# Patient Record
Sex: FEMALE | Race: WHITE | Employment: UNEMPLOYED
[De-identification: names, ages, dates, MRNs, and addresses within clinical notes are randomized per-mention and may not be internally consistent; named-entity substitution may affect disease eponyms.]

---

## 2017-01-02 DIAGNOSIS — I10 BENIGN ESSENTIAL HTN: ICD-10-CM

## 2017-01-03 RX ORDER — LISINOPRIL 10 MG/1
TABLET ORAL
Qty: 30 TABLET | Refills: 5 | Status: SHIPPED | OUTPATIENT
Start: 2017-01-03 | End: 2017-06-27 | Stop reason: SDUPTHER

## 2017-01-03 RX ORDER — TRIAMTERENE AND HYDROCHLOROTHIAZIDE 37.5; 25 MG/1; MG/1
CAPSULE ORAL
Qty: 30 CAPSULE | Refills: 5 | Status: SHIPPED | OUTPATIENT
Start: 2017-01-03 | End: 2017-03-24 | Stop reason: ALTCHOICE

## 2017-01-06 ENCOUNTER — OFFICE VISIT (OUTPATIENT)
Dept: FAMILY MEDICINE CLINIC | Facility: CLINIC | Age: 82
End: 2017-01-06

## 2017-01-06 VITALS
SYSTOLIC BLOOD PRESSURE: 130 MMHG | WEIGHT: 165 LBS | BODY MASS INDEX: 26.52 KG/M2 | HEART RATE: 90 BPM | HEIGHT: 66 IN | DIASTOLIC BLOOD PRESSURE: 82 MMHG

## 2017-01-06 DIAGNOSIS — I25.10 CORONARY ARTERY DISEASE DUE TO LIPID RICH PLAQUE: ICD-10-CM

## 2017-01-06 DIAGNOSIS — I25.83 CORONARY ARTERY DISEASE DUE TO LIPID RICH PLAQUE: ICD-10-CM

## 2017-01-06 DIAGNOSIS — E78.00 HYPERCHOLESTEROLEMIA: ICD-10-CM

## 2017-01-06 DIAGNOSIS — I10 BENIGN ESSENTIAL HTN: Primary | ICD-10-CM

## 2017-01-06 DIAGNOSIS — K21.9 GASTROESOPHAGEAL REFLUX DISEASE WITHOUT ESOPHAGITIS: ICD-10-CM

## 2017-01-06 DIAGNOSIS — N18.2 CONTROLLED TYPE 2 DIABETES MELLITUS WITH STAGE 2 CHRONIC KIDNEY DISEASE, WITHOUT LONG-TERM CURRENT USE OF INSULIN (HCC): ICD-10-CM

## 2017-01-06 DIAGNOSIS — E11.22 CONTROLLED TYPE 2 DIABETES MELLITUS WITH STAGE 2 CHRONIC KIDNEY DISEASE, WITHOUT LONG-TERM CURRENT USE OF INSULIN (HCC): ICD-10-CM

## 2017-01-06 DIAGNOSIS — Z23 NEED FOR INFLUENZA VACCINATION: ICD-10-CM

## 2017-01-06 DIAGNOSIS — Z12.31 VISIT FOR SCREENING MAMMOGRAM: ICD-10-CM

## 2017-01-06 DIAGNOSIS — N18.2 CRI (CHRONIC RENAL INSUFFICIENCY), STAGE 2 (MILD): ICD-10-CM

## 2017-01-06 DIAGNOSIS — E78.2 MIXED HYPERLIPIDEMIA: ICD-10-CM

## 2017-01-06 PROCEDURE — G0008 ADMIN INFLUENZA VIRUS VAC: HCPCS | Performed by: INTERNAL MEDICINE

## 2017-01-06 PROCEDURE — 99214 OFFICE O/P EST MOD 30 MIN: CPT | Performed by: INTERNAL MEDICINE

## 2017-01-06 PROCEDURE — 90686 IIV4 VACC NO PRSV 0.5 ML IM: CPT | Performed by: INTERNAL MEDICINE

## 2017-01-06 ASSESSMENT — ENCOUNTER SYMPTOMS
RHINORRHEA: 0
NAUSEA: 0
CHEST TIGHTNESS: 0
CONSTIPATION: 0
BLOOD IN STOOL: 0
SORE THROAT: 0
ABDOMINAL PAIN: 0
DIARRHEA: 0
SHORTNESS OF BREATH: 0
COUGH: 0

## 2017-01-16 DIAGNOSIS — F32.A DEPRESSION: ICD-10-CM

## 2017-01-16 DIAGNOSIS — K21.00 GASTROESOPHAGEAL REFLUX DISEASE WITH ESOPHAGITIS: ICD-10-CM

## 2017-01-16 RX ORDER — PAROXETINE HYDROCHLORIDE 20 MG/1
TABLET, FILM COATED ORAL
Qty: 30 TABLET | Refills: 5 | Status: SHIPPED | OUTPATIENT
Start: 2017-01-16 | End: 2017-06-27 | Stop reason: SDUPTHER

## 2017-01-16 RX ORDER — LANSOPRAZOLE 30 MG/1
CAPSULE, DELAYED RELEASE ORAL
Qty: 30 CAPSULE | Refills: 5 | Status: SHIPPED | OUTPATIENT
Start: 2017-01-16 | End: 2017-06-27 | Stop reason: SDUPTHER

## 2017-01-24 DIAGNOSIS — N18.2 CONTROLLED TYPE 2 DIABETES MELLITUS WITH STAGE 2 CHRONIC KIDNEY DISEASE, WITHOUT LONG-TERM CURRENT USE OF INSULIN (HCC): ICD-10-CM

## 2017-01-24 DIAGNOSIS — E11.22 CONTROLLED TYPE 2 DIABETES MELLITUS WITH STAGE 2 CHRONIC KIDNEY DISEASE, WITHOUT LONG-TERM CURRENT USE OF INSULIN (HCC): ICD-10-CM

## 2017-02-28 DIAGNOSIS — N18.2 CONTROLLED TYPE 2 DIABETES MELLITUS WITH STAGE 2 CHRONIC KIDNEY DISEASE, WITHOUT LONG-TERM CURRENT USE OF INSULIN (HCC): ICD-10-CM

## 2017-02-28 DIAGNOSIS — E11.22 CONTROLLED TYPE 2 DIABETES MELLITUS WITH STAGE 2 CHRONIC KIDNEY DISEASE, WITHOUT LONG-TERM CURRENT USE OF INSULIN (HCC): ICD-10-CM

## 2017-03-20 ENCOUNTER — HOSPITAL ENCOUNTER (OUTPATIENT)
Dept: GENERAL RADIOLOGY | Age: 82
Discharge: HOME | End: 2017-03-20
Payer: MEDICARE

## 2017-03-20 ENCOUNTER — HOSPITAL ENCOUNTER (OUTPATIENT)
Age: 82
Discharge: HOME | End: 2017-03-20
Payer: MEDICARE

## 2017-03-20 DIAGNOSIS — I10 BENIGN ESSENTIAL HTN: ICD-10-CM

## 2017-03-20 DIAGNOSIS — E78.2 MIXED HYPERLIPIDEMIA: ICD-10-CM

## 2017-03-20 DIAGNOSIS — N18.2 CRI (CHRONIC RENAL INSUFFICIENCY), STAGE 2 (MILD): ICD-10-CM

## 2017-03-20 DIAGNOSIS — I25.10 CORONARY ARTERY DISEASE DUE TO LIPID RICH PLAQUE: ICD-10-CM

## 2017-03-20 DIAGNOSIS — I51.9 HEART DISEASE: ICD-10-CM

## 2017-03-20 DIAGNOSIS — I25.83 CORONARY ARTERY DISEASE DUE TO LIPID RICH PLAQUE: ICD-10-CM

## 2017-03-20 DIAGNOSIS — E11.9 DM II (DIABETES MELLITUS, TYPE II), CONTROLLED (HCC): ICD-10-CM

## 2017-03-20 DIAGNOSIS — E78.00 HYPERCHOLESTEROLEMIA: ICD-10-CM

## 2017-03-20 DIAGNOSIS — E55.9 UNSPECIFIED VITAMIN D DEFICIENCY: ICD-10-CM

## 2017-03-20 LAB
ABSOLUTE EOS #: 0.1 K/UL (ref 0–0.4)
ABSOLUTE LYMPH #: 1.9 K/UL (ref 1.1–2.7)
ABSOLUTE MONO #: 0.3 K/UL (ref 0–1)
ALBUMIN SERPL-MCNC: 4.1 G/DL (ref 3.5–5.2)
ALBUMIN/GLOBULIN RATIO: ABNORMAL (ref 1–2.5)
ALP BLD-CCNC: 104 U/L (ref 35–104)
ALT SERPL-CCNC: 10 U/L (ref 5–33)
ANION GAP SERPL CALCULATED.3IONS-SCNC: 13 MMOL/L (ref 9–17)
AST SERPL-CCNC: 14 U/L
BASOPHILS # BLD: 1 % (ref 0–2)
BASOPHILS ABSOLUTE: 0 K/UL (ref 0–0.2)
BILIRUB SERPL-MCNC: 0.28 MG/DL (ref 0.3–1.2)
BUN BLDV-MCNC: 27 MG/DL (ref 8–23)
BUN/CREAT BLD: 17 (ref 9–20)
CALCIUM SERPL-MCNC: 9.9 MG/DL (ref 8.6–10.4)
CHLORIDE BLD-SCNC: 103 MMOL/L (ref 98–107)
CHOLESTEROL/HDL RATIO: 2.8
CHOLESTEROL: 187 MG/DL
CO2: 25 MMOL/L (ref 20–31)
CREAT SERPL-MCNC: 1.58 MG/DL (ref 0.5–0.9)
DIFFERENTIAL TYPE: YES
EOSINOPHILS RELATIVE PERCENT: 2 % (ref 0–5)
GFR AFRICAN AMERICAN: 38 ML/MIN
GFR NON-AFRICAN AMERICAN: 31 ML/MIN
GFR SERPL CREATININE-BSD FRML MDRD: ABNORMAL ML/MIN/{1.73_M2}
GFR SERPL CREATININE-BSD FRML MDRD: ABNORMAL ML/MIN/{1.73_M2}
GLUCOSE BLD-MCNC: 104 MG/DL (ref 70–99)
HCT VFR BLD CALC: 37.9 % (ref 36–46)
HDLC SERPL-MCNC: 68 MG/DL
HEMOGLOBIN: 12.8 G/DL (ref 12–16)
LDL CHOLESTEROL: 83 MG/DL (ref 0–130)
LYMPHOCYTES # BLD: 30 % (ref 15–40)
MCH RBC QN AUTO: 29.8 PG (ref 26–34)
MCHC RBC AUTO-ENTMCNC: 33.8 G/DL (ref 31–37)
MCV RBC AUTO: 88.3 FL (ref 80–100)
MONOCYTES # BLD: 5 % (ref 4–8)
PATIENT FASTING?: YES
PDW BLD-RTO: 13.8 % (ref 12.1–15.2)
PLATELET # BLD: 211 K/UL (ref 140–450)
PLATELET ESTIMATE: NORMAL
PMV BLD AUTO: NORMAL FL (ref 6–12)
POTASSIUM SERPL-SCNC: 4.2 MMOL/L (ref 3.7–5.3)
RBC # BLD: 4.29 M/UL (ref 4–5.2)
RBC # BLD: NORMAL 10*6/UL
SEG NEUTROPHILS: 62 % (ref 47–75)
SEGMENTED NEUTROPHILS ABSOLUTE COUNT: 4.1 K/UL (ref 2.5–7)
SODIUM BLD-SCNC: 141 MMOL/L (ref 135–144)
TOTAL PROTEIN: 7.4 G/DL (ref 6.4–8.3)
TRIGL SERPL-MCNC: 180 MG/DL
TSH SERPL DL<=0.05 MIU/L-ACNC: 0.74 MIU/L (ref 0.3–5)
VITAMIN D 25-HYDROXY: 43.1 NG/ML (ref 30–100)
VLDLC SERPL CALC-MCNC: 36 MG/DL (ref 1–30)
WBC # BLD: 6.5 K/UL (ref 3.5–11)
WBC # BLD: NORMAL 10*3/UL

## 2017-03-20 PROCEDURE — 82306 VITAMIN D 25 HYDROXY: CPT

## 2017-03-20 PROCEDURE — 71020 XR CHEST STANDARD TWO VW: CPT

## 2017-03-20 PROCEDURE — 36415 COLL VENOUS BLD VENIPUNCTURE: CPT

## 2017-03-20 PROCEDURE — 93005 ELECTROCARDIOGRAM TRACING: CPT

## 2017-03-20 PROCEDURE — 85025 COMPLETE CBC W/AUTO DIFF WBC: CPT

## 2017-03-20 PROCEDURE — 80053 COMPREHEN METABOLIC PANEL: CPT

## 2017-03-20 PROCEDURE — 84443 ASSAY THYROID STIM HORMONE: CPT

## 2017-03-20 PROCEDURE — 80061 LIPID PANEL: CPT

## 2017-03-24 ENCOUNTER — OFFICE VISIT (OUTPATIENT)
Dept: CARDIOLOGY CLINIC | Age: 82
End: 2017-03-24
Payer: MEDICARE

## 2017-03-24 VITALS
BODY MASS INDEX: 26.79 KG/M2 | HEART RATE: 106 BPM | DIASTOLIC BLOOD PRESSURE: 78 MMHG | SYSTOLIC BLOOD PRESSURE: 120 MMHG | OXYGEN SATURATION: 98 % | WEIGHT: 166 LBS

## 2017-03-24 DIAGNOSIS — I51.9 HEART DISEASE: ICD-10-CM

## 2017-03-24 DIAGNOSIS — E78.2 MIXED HYPERLIPIDEMIA: ICD-10-CM

## 2017-03-24 DIAGNOSIS — I25.83 CORONARY ARTERY DISEASE DUE TO LIPID RICH PLAQUE: ICD-10-CM

## 2017-03-24 DIAGNOSIS — I10 BENIGN ESSENTIAL HTN: ICD-10-CM

## 2017-03-24 DIAGNOSIS — E11.22 CONTROLLED TYPE 2 DIABETES MELLITUS WITH STAGE 2 CHRONIC KIDNEY DISEASE, WITHOUT LONG-TERM CURRENT USE OF INSULIN (HCC): ICD-10-CM

## 2017-03-24 DIAGNOSIS — I25.10 CORONARY ARTERY DISEASE DUE TO LIPID RICH PLAQUE: ICD-10-CM

## 2017-03-24 DIAGNOSIS — E55.9 VITAMIN D DEFICIENCY: Primary | ICD-10-CM

## 2017-03-24 DIAGNOSIS — N18.2 CONTROLLED TYPE 2 DIABETES MELLITUS WITH STAGE 2 CHRONIC KIDNEY DISEASE, WITHOUT LONG-TERM CURRENT USE OF INSULIN (HCC): ICD-10-CM

## 2017-03-24 PROCEDURE — 99214 OFFICE O/P EST MOD 30 MIN: CPT | Performed by: INTERNAL MEDICINE

## 2017-03-24 RX ORDER — TRIAMTERENE AND HYDROCHLOROTHIAZIDE 37.5; 25 MG/1; MG/1
1 CAPSULE ORAL EVERY MORNING
COMMUNITY
End: 2017-06-27

## 2017-03-28 LAB
EKG ATRIAL RATE: 77 BPM
EKG P AXIS: 43 DEGREES
EKG P-R INTERVAL: 176 MS
EKG Q-T INTERVAL: 318 MS
EKG QRS DURATION: 58 MS
EKG QTC CALCULATION (BAZETT): 359 MS
EKG R AXIS: 60 DEGREES
EKG T AXIS: 26 DEGREES
EKG VENTRICULAR RATE: 77 BPM

## 2017-03-30 DIAGNOSIS — N18.2 CONTROLLED TYPE 2 DIABETES MELLITUS WITH STAGE 2 CHRONIC KIDNEY DISEASE, WITHOUT LONG-TERM CURRENT USE OF INSULIN (HCC): ICD-10-CM

## 2017-03-30 DIAGNOSIS — E11.22 CONTROLLED TYPE 2 DIABETES MELLITUS WITH STAGE 2 CHRONIC KIDNEY DISEASE, WITHOUT LONG-TERM CURRENT USE OF INSULIN (HCC): ICD-10-CM

## 2017-05-09 DIAGNOSIS — N18.2 CONTROLLED TYPE 2 DIABETES MELLITUS WITH STAGE 2 CHRONIC KIDNEY DISEASE, WITHOUT LONG-TERM CURRENT USE OF INSULIN (HCC): ICD-10-CM

## 2017-05-09 DIAGNOSIS — E11.22 CONTROLLED TYPE 2 DIABETES MELLITUS WITH STAGE 2 CHRONIC KIDNEY DISEASE, WITHOUT LONG-TERM CURRENT USE OF INSULIN (HCC): ICD-10-CM

## 2017-06-27 DIAGNOSIS — F32.A DEPRESSION: ICD-10-CM

## 2017-06-27 DIAGNOSIS — K21.00 GASTROESOPHAGEAL REFLUX DISEASE WITH ESOPHAGITIS: ICD-10-CM

## 2017-06-27 DIAGNOSIS — E78.2 MIXED HYPERLIPIDEMIA: ICD-10-CM

## 2017-06-27 DIAGNOSIS — I10 BENIGN ESSENTIAL HTN: ICD-10-CM

## 2017-06-27 RX ORDER — TRIAMTERENE AND HYDROCHLOROTHIAZIDE 37.5; 25 MG/1; MG/1
CAPSULE ORAL
Qty: 30 CAPSULE | Refills: 2 | Status: SHIPPED | OUTPATIENT
Start: 2017-06-27 | End: 2017-09-19 | Stop reason: SDUPTHER

## 2017-06-27 RX ORDER — PAROXETINE HYDROCHLORIDE 20 MG/1
TABLET, FILM COATED ORAL
Qty: 30 TABLET | Refills: 2 | Status: SHIPPED | OUTPATIENT
Start: 2017-06-27 | End: 2017-09-19 | Stop reason: SDUPTHER

## 2017-06-27 RX ORDER — LOVASTATIN 40 MG/1
TABLET ORAL
Qty: 60 TABLET | Refills: 2 | Status: SHIPPED | OUTPATIENT
Start: 2017-06-27 | End: 2018-01-06 | Stop reason: SDUPTHER

## 2017-06-27 RX ORDER — LANSOPRAZOLE 30 MG/1
CAPSULE, DELAYED RELEASE ORAL
Qty: 30 CAPSULE | Refills: 2 | Status: SHIPPED | OUTPATIENT
Start: 2017-06-27 | End: 2017-09-19 | Stop reason: SDUPTHER

## 2017-06-27 RX ORDER — LISINOPRIL 10 MG/1
TABLET ORAL
Qty: 30 TABLET | Refills: 2 | Status: SHIPPED | OUTPATIENT
Start: 2017-06-27 | End: 2017-09-19 | Stop reason: SDUPTHER

## 2017-07-06 ENCOUNTER — OFFICE VISIT (OUTPATIENT)
Dept: FAMILY MEDICINE CLINIC | Age: 82
End: 2017-07-06
Payer: MEDICARE

## 2017-07-06 VITALS
HEIGHT: 62 IN | SYSTOLIC BLOOD PRESSURE: 139 MMHG | BODY MASS INDEX: 30.73 KG/M2 | WEIGHT: 167 LBS | DIASTOLIC BLOOD PRESSURE: 84 MMHG | HEART RATE: 76 BPM

## 2017-07-06 DIAGNOSIS — E78.00 HYPERCHOLESTEROLEMIA: ICD-10-CM

## 2017-07-06 DIAGNOSIS — E11.22 CONTROLLED TYPE 2 DIABETES MELLITUS WITH STAGE 2 CHRONIC KIDNEY DISEASE, WITHOUT LONG-TERM CURRENT USE OF INSULIN (HCC): ICD-10-CM

## 2017-07-06 DIAGNOSIS — E55.9 VITAMIN D DEFICIENCY: ICD-10-CM

## 2017-07-06 DIAGNOSIS — F32.5 MAJOR DEPRESSIVE DISORDER WITH SINGLE EPISODE, IN FULL REMISSION (HCC): ICD-10-CM

## 2017-07-06 DIAGNOSIS — Z23 NEED FOR PNEUMOCOCCAL VACCINATION: ICD-10-CM

## 2017-07-06 DIAGNOSIS — N18.2 CRI (CHRONIC RENAL INSUFFICIENCY), STAGE 2 (MILD): ICD-10-CM

## 2017-07-06 DIAGNOSIS — I10 BENIGN ESSENTIAL HTN: Primary | ICD-10-CM

## 2017-07-06 DIAGNOSIS — Z85.038 HISTORY OF COLON CANCER: ICD-10-CM

## 2017-07-06 DIAGNOSIS — Z12.11 COLON CANCER SCREENING: ICD-10-CM

## 2017-07-06 DIAGNOSIS — N18.2 CONTROLLED TYPE 2 DIABETES MELLITUS WITH STAGE 2 CHRONIC KIDNEY DISEASE, WITHOUT LONG-TERM CURRENT USE OF INSULIN (HCC): ICD-10-CM

## 2017-07-06 DIAGNOSIS — K21.9 GASTROESOPHAGEAL REFLUX DISEASE WITHOUT ESOPHAGITIS: ICD-10-CM

## 2017-07-06 DIAGNOSIS — I51.9 HEART DISEASE: ICD-10-CM

## 2017-07-06 PROCEDURE — 99214 OFFICE O/P EST MOD 30 MIN: CPT | Performed by: INTERNAL MEDICINE

## 2017-07-06 PROCEDURE — 90732 PPSV23 VACC 2 YRS+ SUBQ/IM: CPT | Performed by: INTERNAL MEDICINE

## 2017-07-06 PROCEDURE — G0009 ADMIN PNEUMOCOCCAL VACCINE: HCPCS | Performed by: INTERNAL MEDICINE

## 2017-07-06 PROCEDURE — 4040F PNEUMOC VAC/ADMIN/RCVD: CPT | Performed by: INTERNAL MEDICINE

## 2017-07-06 PROCEDURE — G8417 CALC BMI ABV UP PARAM F/U: HCPCS | Performed by: INTERNAL MEDICINE

## 2017-07-06 PROCEDURE — 1090F PRES/ABSN URINE INCON ASSESS: CPT | Performed by: INTERNAL MEDICINE

## 2017-07-06 PROCEDURE — 1036F TOBACCO NON-USER: CPT | Performed by: INTERNAL MEDICINE

## 2017-07-06 PROCEDURE — G8399 PT W/DXA RESULTS DOCUMENT: HCPCS | Performed by: INTERNAL MEDICINE

## 2017-07-06 PROCEDURE — 1123F ACP DISCUSS/DSCN MKR DOCD: CPT | Performed by: INTERNAL MEDICINE

## 2017-07-06 PROCEDURE — G8427 DOCREV CUR MEDS BY ELIG CLIN: HCPCS | Performed by: INTERNAL MEDICINE

## 2017-07-06 PROCEDURE — G8598 ASA/ANTIPLAT THER USED: HCPCS | Performed by: INTERNAL MEDICINE

## 2017-07-06 RX ORDER — SODIUM, POTASSIUM,MAG SULFATES 17.5-3.13G
SOLUTION, RECONSTITUTED, ORAL ORAL
Qty: 1 BOTTLE | Refills: 0 | Status: ON HOLD
Start: 2017-07-06 | End: 2017-07-10 | Stop reason: ALTCHOICE

## 2017-07-06 ASSESSMENT — PATIENT HEALTH QUESTIONNAIRE - PHQ9
2. FEELING DOWN, DEPRESSED OR HOPELESS: 0
SUM OF ALL RESPONSES TO PHQ QUESTIONS 1-9: 0
SUM OF ALL RESPONSES TO PHQ9 QUESTIONS 1 & 2: 0
1. LITTLE INTEREST OR PLEASURE IN DOING THINGS: 0

## 2017-07-06 ASSESSMENT — ENCOUNTER SYMPTOMS
NAUSEA: 0
DIARRHEA: 0
SHORTNESS OF BREATH: 0
RHINORRHEA: 0
SORE THROAT: 0
ABDOMINAL PAIN: 0
CHEST TIGHTNESS: 0
COUGH: 0
BLOOD IN STOOL: 0
CONSTIPATION: 0

## 2017-07-10 ENCOUNTER — ANESTHESIA (OUTPATIENT)
Dept: OPERATING ROOM | Age: 82
End: 2017-07-10
Payer: MEDICARE

## 2017-07-10 ENCOUNTER — ANESTHESIA EVENT (OUTPATIENT)
Dept: OPERATING ROOM | Age: 82
End: 2017-07-10
Payer: MEDICARE

## 2017-07-10 ENCOUNTER — HOSPITAL ENCOUNTER (OUTPATIENT)
Age: 82
Setting detail: OUTPATIENT SURGERY
Discharge: HOME | End: 2017-07-10
Attending: INTERNAL MEDICINE | Admitting: INTERNAL MEDICINE
Payer: MEDICARE

## 2017-07-10 VITALS
DIASTOLIC BLOOD PRESSURE: 61 MMHG | RESPIRATION RATE: 12 BRPM | OXYGEN SATURATION: 100 % | SYSTOLIC BLOOD PRESSURE: 110 MMHG

## 2017-07-10 VITALS
SYSTOLIC BLOOD PRESSURE: 134 MMHG | OXYGEN SATURATION: 94 % | HEART RATE: 78 BPM | HEIGHT: 63 IN | WEIGHT: 164 LBS | RESPIRATION RATE: 16 BRPM | BODY MASS INDEX: 29.06 KG/M2 | DIASTOLIC BLOOD PRESSURE: 66 MMHG | TEMPERATURE: 97 F

## 2017-07-10 PROBLEM — Z12.11 COLON CANCER SCREENING: Status: ACTIVE | Noted: 2017-07-10

## 2017-07-10 LAB — GLUCOSE BLD-MCNC: 82 MG/DL (ref 65–99)

## 2017-07-10 PROCEDURE — 3609027000 HC COLONOSCOPY: Performed by: INTERNAL MEDICINE

## 2017-07-10 PROCEDURE — 2580000003 HC RX 258: Performed by: INTERNAL MEDICINE

## 2017-07-10 PROCEDURE — 2580000003 HC RX 258: Performed by: NURSE ANESTHETIST, CERTIFIED REGISTERED

## 2017-07-10 PROCEDURE — 7100000011 HC PHASE II RECOVERY - ADDTL 15 MIN: Performed by: INTERNAL MEDICINE

## 2017-07-10 PROCEDURE — 7100000010 HC PHASE II RECOVERY - FIRST 15 MIN: Performed by: INTERNAL MEDICINE

## 2017-07-10 PROCEDURE — 88305 TISSUE EXAM BY PATHOLOGIST: CPT

## 2017-07-10 PROCEDURE — 3700000001 HC ADD 15 MINUTES (ANESTHESIA): Performed by: INTERNAL MEDICINE

## 2017-07-10 PROCEDURE — 2500000003 HC RX 250 WO HCPCS

## 2017-07-10 PROCEDURE — 2500000003 HC RX 250 WO HCPCS: Performed by: NURSE ANESTHETIST, CERTIFIED REGISTERED

## 2017-07-10 PROCEDURE — 45380 COLONOSCOPY AND BIOPSY: CPT | Performed by: INTERNAL MEDICINE

## 2017-07-10 PROCEDURE — 6360000002 HC RX W HCPCS: Performed by: NURSE ANESTHETIST, CERTIFIED REGISTERED

## 2017-07-10 PROCEDURE — 82947 ASSAY GLUCOSE BLOOD QUANT: CPT

## 2017-07-10 PROCEDURE — 3700000000 HC ANESTHESIA ATTENDED CARE: Performed by: INTERNAL MEDICINE

## 2017-07-10 RX ORDER — SODIUM CHLORIDE, SODIUM LACTATE, POTASSIUM CHLORIDE, CALCIUM CHLORIDE 600; 310; 30; 20 MG/100ML; MG/100ML; MG/100ML; MG/100ML
INJECTION, SOLUTION INTRAVENOUS CONTINUOUS
Status: DISCONTINUED | OUTPATIENT
Start: 2017-07-10 | End: 2017-07-10 | Stop reason: HOSPADM

## 2017-07-10 RX ORDER — FENTANYL CITRATE 50 UG/ML
INJECTION, SOLUTION INTRAMUSCULAR; INTRAVENOUS PRN
Status: DISCONTINUED | OUTPATIENT
Start: 2017-07-10 | End: 2017-07-10 | Stop reason: SDUPTHER

## 2017-07-10 RX ORDER — MIDAZOLAM HYDROCHLORIDE 1 MG/ML
INJECTION INTRAMUSCULAR; INTRAVENOUS PRN
Status: DISCONTINUED | OUTPATIENT
Start: 2017-07-10 | End: 2017-07-10 | Stop reason: SDUPTHER

## 2017-07-10 RX ORDER — LIDOCAINE HYDROCHLORIDE 10 MG/ML
INJECTION, SOLUTION EPIDURAL; INFILTRATION; INTRACAUDAL; PERINEURAL PRN
Status: DISCONTINUED | OUTPATIENT
Start: 2017-07-10 | End: 2017-07-10 | Stop reason: SDUPTHER

## 2017-07-10 RX ORDER — PROPOFOL 10 MG/ML
INJECTION, EMULSION INTRAVENOUS CONTINUOUS PRN
Status: DISCONTINUED | OUTPATIENT
Start: 2017-07-10 | End: 2017-07-10 | Stop reason: SDUPTHER

## 2017-07-10 RX ORDER — SODIUM CHLORIDE 0.9 % (FLUSH) 0.9 %
10 SYRINGE (ML) INJECTION PRN
Status: DISCONTINUED | OUTPATIENT
Start: 2017-07-10 | End: 2017-07-10 | Stop reason: HOSPADM

## 2017-07-10 RX ORDER — GLYCOPYRROLATE 0.2 MG/ML
INJECTION INTRAMUSCULAR; INTRAVENOUS PRN
Status: DISCONTINUED | OUTPATIENT
Start: 2017-07-10 | End: 2017-07-10 | Stop reason: SDUPTHER

## 2017-07-10 RX ORDER — SODIUM CHLORIDE, SODIUM LACTATE, POTASSIUM CHLORIDE, CALCIUM CHLORIDE 600; 310; 30; 20 MG/100ML; MG/100ML; MG/100ML; MG/100ML
INJECTION, SOLUTION INTRAVENOUS CONTINUOUS PRN
Status: DISCONTINUED | OUTPATIENT
Start: 2017-07-10 | End: 2017-07-10 | Stop reason: SDUPTHER

## 2017-07-10 RX ADMIN — PROPOFOL 50 MCG/KG/MIN: 10 INJECTION, EMULSION INTRAVENOUS at 07:32

## 2017-07-10 RX ADMIN — MIDAZOLAM 0.5 MG: 1 INJECTION INTRAMUSCULAR; INTRAVENOUS at 07:35

## 2017-07-10 RX ADMIN — MIDAZOLAM 0.5 MG: 1 INJECTION INTRAMUSCULAR; INTRAVENOUS at 07:30

## 2017-07-10 RX ADMIN — GLYCOPYRROLATE 0.2 MG: 0.2 INJECTION INTRAMUSCULAR; INTRAVENOUS at 07:35

## 2017-07-10 RX ADMIN — LIDOCAINE HYDROCHLORIDE 50 MG: 10 INJECTION, SOLUTION EPIDURAL; INFILTRATION; INTRACAUDAL; PERINEURAL at 07:35

## 2017-07-10 RX ADMIN — SODIUM CHLORIDE, POTASSIUM CHLORIDE, SODIUM LACTATE AND CALCIUM CHLORIDE: 600; 310; 30; 20 INJECTION, SOLUTION INTRAVENOUS at 07:21

## 2017-07-10 RX ADMIN — SODIUM CHLORIDE, POTASSIUM CHLORIDE, SODIUM LACTATE AND CALCIUM CHLORIDE: 600; 310; 30; 20 INJECTION, SOLUTION INTRAVENOUS at 07:10

## 2017-07-10 RX ADMIN — FENTANYL CITRATE 25 MCG: 50 INJECTION, SOLUTION INTRAMUSCULAR; INTRAVENOUS at 07:33

## 2017-07-10 ASSESSMENT — PAIN SCALES - GENERAL: PAINLEVEL_OUTOF10: 0

## 2017-07-10 ASSESSMENT — PAIN - FUNCTIONAL ASSESSMENT: PAIN_FUNCTIONAL_ASSESSMENT: 0-10

## 2017-07-11 LAB — SURGICAL PATHOLOGY REPORT: NORMAL

## 2017-07-25 ENCOUNTER — OFFICE VISIT (OUTPATIENT)
Dept: FAMILY MEDICINE CLINIC | Age: 82
End: 2017-07-25
Payer: MEDICARE

## 2017-07-25 VITALS
WEIGHT: 166 LBS | SYSTOLIC BLOOD PRESSURE: 136 MMHG | DIASTOLIC BLOOD PRESSURE: 74 MMHG | HEART RATE: 70 BPM | BODY MASS INDEX: 26.68 KG/M2 | HEIGHT: 66 IN

## 2017-07-25 DIAGNOSIS — D12.6 ADENOMATOUS COLON POLYP: Primary | ICD-10-CM

## 2017-07-25 PROCEDURE — G8417 CALC BMI ABV UP PARAM F/U: HCPCS | Performed by: INTERNAL MEDICINE

## 2017-07-25 PROCEDURE — G8399 PT W/DXA RESULTS DOCUMENT: HCPCS | Performed by: INTERNAL MEDICINE

## 2017-07-25 PROCEDURE — 1036F TOBACCO NON-USER: CPT | Performed by: INTERNAL MEDICINE

## 2017-07-25 PROCEDURE — 99213 OFFICE O/P EST LOW 20 MIN: CPT | Performed by: INTERNAL MEDICINE

## 2017-07-25 PROCEDURE — 1123F ACP DISCUSS/DSCN MKR DOCD: CPT | Performed by: INTERNAL MEDICINE

## 2017-07-25 PROCEDURE — 4040F PNEUMOC VAC/ADMIN/RCVD: CPT | Performed by: INTERNAL MEDICINE

## 2017-07-25 PROCEDURE — G8427 DOCREV CUR MEDS BY ELIG CLIN: HCPCS | Performed by: INTERNAL MEDICINE

## 2017-07-25 PROCEDURE — 1090F PRES/ABSN URINE INCON ASSESS: CPT | Performed by: INTERNAL MEDICINE

## 2017-07-25 PROCEDURE — G8598 ASA/ANTIPLAT THER USED: HCPCS | Performed by: INTERNAL MEDICINE

## 2017-07-25 ASSESSMENT — ENCOUNTER SYMPTOMS
ABDOMINAL PAIN: 0
CHEST TIGHTNESS: 0
COUGH: 0
BLOOD IN STOOL: 0
SHORTNESS OF BREATH: 0
DIARRHEA: 0
NAUSEA: 0
CONSTIPATION: 0
SORE THROAT: 0
RHINORRHEA: 0

## 2017-09-19 DIAGNOSIS — F32.A DEPRESSION: ICD-10-CM

## 2017-09-19 DIAGNOSIS — I10 BENIGN ESSENTIAL HTN: ICD-10-CM

## 2017-09-19 DIAGNOSIS — K21.00 GASTROESOPHAGEAL REFLUX DISEASE WITH ESOPHAGITIS: ICD-10-CM

## 2017-09-19 RX ORDER — TRIAMTERENE AND HYDROCHLOROTHIAZIDE 37.5; 25 MG/1; MG/1
CAPSULE ORAL
Qty: 30 CAPSULE | Refills: 2 | Status: SHIPPED | OUTPATIENT
Start: 2017-09-19 | End: 2018-01-06 | Stop reason: SDUPTHER

## 2017-09-19 RX ORDER — LISINOPRIL 10 MG/1
TABLET ORAL
Qty: 30 TABLET | Refills: 2 | Status: SHIPPED | OUTPATIENT
Start: 2017-09-19 | End: 2018-01-06 | Stop reason: SDUPTHER

## 2017-09-19 RX ORDER — LANSOPRAZOLE 30 MG/1
CAPSULE, DELAYED RELEASE ORAL
Qty: 30 CAPSULE | Refills: 2 | Status: SHIPPED | OUTPATIENT
Start: 2017-09-19 | End: 2018-01-06 | Stop reason: SDUPTHER

## 2017-09-19 RX ORDER — PAROXETINE HYDROCHLORIDE 20 MG/1
TABLET, FILM COATED ORAL
Qty: 30 TABLET | Refills: 2 | Status: SHIPPED | OUTPATIENT
Start: 2017-09-19 | End: 2018-01-06 | Stop reason: SDUPTHER

## 2017-10-10 NOTE — TELEPHONE ENCOUNTER
renal insufficiency)     Controlled type 2 diabetes mellitus with stage 2 chronic kidney disease, without long-term current use of insulin (HCC)     Colon cancer screening

## 2017-12-05 DIAGNOSIS — E11.22 CONTROLLED TYPE 2 DIABETES MELLITUS WITH STAGE 2 CHRONIC KIDNEY DISEASE, WITHOUT LONG-TERM CURRENT USE OF INSULIN (HCC): ICD-10-CM

## 2017-12-05 DIAGNOSIS — N18.2 CONTROLLED TYPE 2 DIABETES MELLITUS WITH STAGE 2 CHRONIC KIDNEY DISEASE, WITHOUT LONG-TERM CURRENT USE OF INSULIN (HCC): ICD-10-CM

## 2017-12-05 NOTE — TELEPHONE ENCOUNTER
Health Maintenance   Topic Date Due    DTaP/Tdap/Td vaccine (1 - Tdap) 10/20/1950    Zostavax vaccine  10/20/1991    Flu vaccine (1) 09/01/2017    Pneumococcal low/med risk  Completed             (applicable per patient's age: Cancer Screenings, Depression Screening, Fall Risk Screening, Immunizations)    Hemoglobin A1C (%)   Date Value   01/16/2017 5.5   03/21/2016 5.9   06/23/2014 6.0 (H)     Microalb/Crt.  Ratio (mcg/mg creat)   Date Value   08/07/2013 132     LDL Cholesterol (mg/dL)   Date Value   03/20/2017 83     AST (U/L)   Date Value   03/20/2017 14     ALT (U/L)   Date Value   03/20/2017 10     BUN (mg/dL)   Date Value   03/20/2017 27 (H)      (goal A1C is < 7)   (goal LDL is <100) need 30-50% reduction from baseline     BP Readings from Last 3 Encounters:   07/25/17 136/74   07/10/17 134/66   07/10/17 110/61    (goal /80)      All Future Testing planned in [facility]:  Lab Frequency Next Occurrence   CBC Auto Differential Once 02/24/2018   TSH with Reflex Once 02/24/2018   Comprehensive Metabolic Panel Once 46/92/7659   Vitamin D 25 Hydroxy Once 02/24/2018   Lipid Panel Once 02/24/2018   Magnesium Once 02/24/2018   EKG 12 Lead Once 02/24/2018   XR Chest Standard TWO VW Once 02/24/2018   Hemoglobin A1C Once 03/24/2018       Next Visit Date:  Future Appointments  Date Time Provider Pradeep Coello   2[facility]2018 1:45 PM [facility]   3/23/2018 9:30 AM Mónica Smith MD Khoi Pollen [facility]            Patient Active Problem List:     Vitamin D deficiency     Osteoporosis, senile     Mixed hyperlipidemia     History of colon cancer     GERD (gastroesophageal reflux disease)     CAD (coronary artery disease)     Benign essential HTN     Hypercholesterolemia     Esophageal reflux     Cancer ([address]     Heart disease     Osteoarthrosis     Colon cancer ([facility] [address]     Depression     CRI (chronic renal insufficiency)     Controlled type 2 diabetes mellitus with stage 2 chronic kidney disease, without long-term current use of insulin [facility]     Colon cancer screening

## 2018-01-06 DIAGNOSIS — F32.A DEPRESSION: ICD-10-CM

## 2018-01-06 DIAGNOSIS — I10 BENIGN ESSENTIAL HTN: ICD-10-CM

## 2018-01-06 DIAGNOSIS — K21.00 GASTROESOPHAGEAL REFLUX DISEASE WITH ESOPHAGITIS: ICD-10-CM

## 2018-01-06 DIAGNOSIS — E78.2 MIXED HYPERLIPIDEMIA: ICD-10-CM

## 2018-01-08 RX ORDER — LANSOPRAZOLE 30 MG/1
CAPSULE, DELAYED RELEASE ORAL
Qty: 30 CAPSULE | Refills: 3 | Status: SHIPPED | OUTPATIENT
Start: 2018-01-08 | End: 2018-04-26 | Stop reason: SDUPTHER

## 2018-01-08 RX ORDER — TRIAMTERENE AND HYDROCHLOROTHIAZIDE 37.5; 25 MG/1; MG/1
CAPSULE ORAL
Qty: 30 CAPSULE | Refills: 3 | Status: SHIPPED | OUTPATIENT
Start: 2018-01-08 | End: 2018-04-26 | Stop reason: SDUPTHER

## 2018-01-08 RX ORDER — LISINOPRIL 10 MG/1
TABLET ORAL
Qty: 30 TABLET | Refills: 3 | Status: SHIPPED | OUTPATIENT
Start: 2018-01-08 | End: 2018-04-26 | Stop reason: SDUPTHER

## 2018-01-08 RX ORDER — PAROXETINE HYDROCHLORIDE 20 MG/1
TABLET, FILM COATED ORAL
Qty: 30 TABLET | Refills: 3 | Status: SHIPPED | OUTPATIENT
Start: 2018-01-08 | End: 2018-04-26 | Stop reason: SDUPTHER

## 2018-01-08 RX ORDER — LOVASTATIN 40 MG/1
TABLET ORAL
Qty: 60 TABLET | Refills: 3 | Status: SHIPPED | OUTPATIENT
Start: 2018-01-08 | End: 2018-10-07 | Stop reason: SDUPTHER

## 2018-01-08 NOTE — TELEPHONE ENCOUNTER
Controlled type 2 diabetes mellitus with stage 2 chronic kidney disease, without long-term current use of insulin ([facility] [address]     Colon cancer screening

## 2018-01-16 DIAGNOSIS — N18.2 CONTROLLED TYPE 2 DIABETES MELLITUS WITH STAGE 2 CHRONIC KIDNEY DISEASE, WITHOUT LONG-TERM CURRENT USE OF INSULIN (HCC): Primary | ICD-10-CM

## 2018-01-16 DIAGNOSIS — E11.22 CONTROLLED TYPE 2 DIABETES MELLITUS WITH STAGE 2 CHRONIC KIDNEY DISEASE, WITHOUT LONG-TERM CURRENT USE OF INSULIN (HCC): Primary | ICD-10-CM

## 2018-01-16 NOTE — TELEPHONE ENCOUNTER
Health Maintenance   Topic Date Due    DTaP/Tdap/Td vaccine (1 - Tdap) 10/20/1950    Zostavax vaccine  10/20/1991    Flu vaccine (1) 09/01/2017    Potassium monitoring  03/20/2018    Creatinine monitoring  03/20/2018    Pneumococcal low/med risk  Completed             (applicable per patient's age: Cancer Screenings, Depression Screening, Fall Risk Screening, Immunizations)    Hemoglobin A1C (%)   Date Value   01/16/2017 5.5   03/21/2016 5.9   06/23/2014 6.0 (H)     Microalb/Crt.  Ratio (mcg/mg creat)   Date Value   08/07/2013 132     LDL Cholesterol (mg/dL)   Date Value   03/20/2017 83     AST (U/L)   Date Value   03/20/2017 14     ALT (U/L)   Date Value   03/20/2017 10     BUN (mg/dL)   Date Value   03/20/2017 27 (H)      (goal A1C is < 7)   (goal LDL is <100) need 30-50% reduction from baseline     BP Readings from Last 3 Encounters:   07/25/17 136/74   07/10/17 134/66   07/10/17 110/61    (goal /80)      All Future Testing planned in [facility]:  Lab Frequency Next Occurrence   CBC Auto Differential Once 02/24/2018   TSH with Reflex Once 02/24/2018   Comprehensive Metabolic Panel Once 91/13/2124   Vitamin D 25 Hydroxy Once 02/24/2018   Lipid Panel Once 02/24/2018   Magnesium Once 02/24/2018   EKG 12 Lead Once 02/24/2018   XR Chest Standard TWO VW Once 02/24/2018   Hemoglobin A1C Once 03/24/2018       Next Visit Date:  Future Appointments  Date Time Provider Pradeep Coello   2/9/2018 1:45 PM MD Xavier Pena [facility]   3/23/2018 9:30 AM MD Jose Manuel Goncalves [facility]            Patient Active Problem List:     Vitamin D deficiency     Osteoporosis, senile     Mixed hyperlipidemia     History of colon cancer     GERD (gastroesophageal reflux disease)     CAD (coronary artery disease)     Benign essential HTN     Hypercholesterolemia     Esophageal reflux     Cancer ([address]     Heart disease     Osteoarthrosis     Colon cancer ([facility] [address]     Depression     CRI (chronic renal insufficiency) Controlled type 2 diabetes mellitus with stage 2 chronic kidney disease, without long-term current use of insulin ([address]     Colon cancer screening

## 2018-02-09 ENCOUNTER — OFFICE VISIT (OUTPATIENT)
Dept: FAMILY MEDICINE CLINIC | Age: 83
End: 2018-02-09
Payer: MEDICARE

## 2018-02-09 VITALS
HEIGHT: 66 IN | DIASTOLIC BLOOD PRESSURE: 75 MMHG | HEART RATE: 78 BPM | BODY MASS INDEX: 26.03 KG/M2 | WEIGHT: 162 LBS | SYSTOLIC BLOOD PRESSURE: 136 MMHG

## 2018-02-09 DIAGNOSIS — Z23 NEED FOR INFLUENZA VACCINATION: ICD-10-CM

## 2018-02-09 DIAGNOSIS — N18.2 CONTROLLED TYPE 2 DIABETES MELLITUS WITH STAGE 2 CHRONIC KIDNEY DISEASE, WITHOUT LONG-TERM CURRENT USE OF INSULIN (HCC): ICD-10-CM

## 2018-02-09 DIAGNOSIS — M81.0 OSTEOPOROSIS, SENILE: ICD-10-CM

## 2018-02-09 DIAGNOSIS — E11.21 DIABETIC NEPHROPATHY ASSOCIATED WITH TYPE 2 DIABETES MELLITUS (HCC): ICD-10-CM

## 2018-02-09 DIAGNOSIS — E78.2 MIXED HYPERLIPIDEMIA: ICD-10-CM

## 2018-02-09 DIAGNOSIS — I10 BENIGN ESSENTIAL HTN: Primary | ICD-10-CM

## 2018-02-09 DIAGNOSIS — N18.2 CHRONIC RENAL IMPAIRMENT, STAGE 2 (MILD): ICD-10-CM

## 2018-02-09 DIAGNOSIS — E11.22 CONTROLLED TYPE 2 DIABETES MELLITUS WITH STAGE 2 CHRONIC KIDNEY DISEASE, WITHOUT LONG-TERM CURRENT USE OF INSULIN (HCC): ICD-10-CM

## 2018-02-09 DIAGNOSIS — I25.83 CORONARY ARTERY DISEASE DUE TO LIPID RICH PLAQUE: ICD-10-CM

## 2018-02-09 DIAGNOSIS — I25.10 CORONARY ARTERY DISEASE DUE TO LIPID RICH PLAQUE: ICD-10-CM

## 2018-02-09 DIAGNOSIS — K21.9 GASTROESOPHAGEAL REFLUX DISEASE WITHOUT ESOPHAGITIS: ICD-10-CM

## 2018-02-09 DIAGNOSIS — F32.5 MAJOR DEPRESSION, SINGLE EPISODE, IN COMPLETE REMISSION (HCC): ICD-10-CM

## 2018-02-09 DIAGNOSIS — E55.9 VITAMIN D DEFICIENCY: ICD-10-CM

## 2018-02-09 PROCEDURE — G8484 FLU IMMUNIZE NO ADMIN: HCPCS | Performed by: INTERNAL MEDICINE

## 2018-02-09 PROCEDURE — 4040F PNEUMOC VAC/ADMIN/RCVD: CPT | Performed by: INTERNAL MEDICINE

## 2018-02-09 PROCEDURE — G0008 ADMIN INFLUENZA VIRUS VAC: HCPCS | Performed by: INTERNAL MEDICINE

## 2018-02-09 PROCEDURE — G8427 DOCREV CUR MEDS BY ELIG CLIN: HCPCS | Performed by: INTERNAL MEDICINE

## 2018-02-09 PROCEDURE — G8419 CALC BMI OUT NRM PARAM NOF/U: HCPCS | Performed by: INTERNAL MEDICINE

## 2018-02-09 PROCEDURE — 3288F FALL RISK ASSESSMENT DOCD: CPT | Performed by: INTERNAL MEDICINE

## 2018-02-09 PROCEDURE — 90686 IIV4 VACC NO PRSV 0.5 ML IM: CPT | Performed by: INTERNAL MEDICINE

## 2018-02-09 PROCEDURE — G8598 ASA/ANTIPLAT THER USED: HCPCS | Performed by: INTERNAL MEDICINE

## 2018-02-09 PROCEDURE — 1123F ACP DISCUSS/DSCN MKR DOCD: CPT | Performed by: INTERNAL MEDICINE

## 2018-02-09 PROCEDURE — 1090F PRES/ABSN URINE INCON ASSESS: CPT | Performed by: INTERNAL MEDICINE

## 2018-02-09 PROCEDURE — 1036F TOBACCO NON-USER: CPT | Performed by: INTERNAL MEDICINE

## 2018-02-09 PROCEDURE — 99214 OFFICE O/P EST MOD 30 MIN: CPT | Performed by: INTERNAL MEDICINE

## 2018-02-09 ASSESSMENT — ENCOUNTER SYMPTOMS
COUGH: 0
NAUSEA: 0
BLOOD IN STOOL: 0
SHORTNESS OF BREATH: 0
SORE THROAT: 0
RHINORRHEA: 0
ABDOMINAL PAIN: 0
CHEST TIGHTNESS: 0
CONSTIPATION: 0
DIARRHEA: 0

## 2018-02-09 NOTE — PROGRESS NOTES
Chronic Disease Visit Information    BP Readings from Last 3 Encounters:   07/25/17 136/74   07/10/17 134/66   07/10/17 110/61          Hemoglobin A1C (%)   Date Value   01/16/2017 5.5   03/21/2016 5.9   06/23/2014 6.0 (H)     Microalb/Crt. Ratio (mcg/mg creat)   Date Value   08/07/2013 132     LDL Cholesterol (mg/dL)   Date Value   03/20/2017 83     HDL (mg/dL)   Date Value   03/20/2017 68     BUN (mg/dL)   Date Value   03/20/2017 27 (H)     CREATININE (mg/dL)   Date Value   03/20/2017 1.58 (H)     Glucose (mg/dL)   Date Value   03/20/2017 104 (H)   04/13/2012 114 (H)            Have you changed or started any medications since your last visit including any over-the-counter medicines, vitamins, or herbal medicines? no   Are you having any side effects from any of your medications? -  no  Have you stopped taking any of your medications? Is so, why? -  no    Have you seen any other physician or provider since your last visit? No  Have you had any other diagnostic tests since your last visit? No  Have you been seen in the emergency room and/or had an admission to a hospital since we last saw you? No  Have you had your annual diabetic retinal (eye) exam? No  Have you had your routine dental cleaning in the past 6 months? no    Have you activated your iMedicare account? If not, what are your barriers?  Yes     Patient Care Team:  Malorie Mcgrath MD as PCP - General (Internal Medicine)  Malorie Mcgrath MD as PCP - S Attributed Provider  Baron Plata (Oncology)         Medical History Review  Past Medical, Family, and Social History reviewed and does contribute to the patient presenting condition    Health Maintenance   Topic Date Due    DTaP/Tdap/Td vaccine (1 - Tdap) 10/20/1950    Zostavax vaccine  10/20/1991    Flu vaccine (1) 09/01/2017    Potassium monitoring  03/20/2018    Creatinine monitoring  03/20/2018    Pneumococcal low/med risk  Completed

## 2018-02-09 NOTE — PROGRESS NOTES
Component                Value               Date                       EAG                      111                 01/16/2017              Lab Results       Component                Value               Date                       CHOL                     187                 03/20/2017                 CHOL                     175                 03/21/2016                 CHOL                     183                 01/26/2015            Lab Results       Component                Value               Date                       TRIG                     180 (H)             03/20/2017                 TRIG                     236 (H)             03/21/2016                 TRIG                     170 (H)             01/26/2015            Lab Results       Component                Value               Date                       HDL                      68                  03/20/2017                 HDL                      61                  03/21/2016                 HDL                      66                  01/26/2015            Lab Results       Component                Value               Date                       LDLCHOLESTEROL           83                  03/20/2017                 LDLCHOLESTEROL           67                  03/21/2016                 LDLCHOLESTEROL           83                  01/26/2015            Lab Results       Component                Value               Date                       VLDL                     36 (H)              03/20/2017                 VLDL                     47 (H)              03/21/2016                 VLDL                     34 (H)              01/26/2015            Lab Results       Component                Value               Date                       CHOLHDLRATIO             2.8                 03/20/2017                 CHOLHDLRATIO             2.9                 03/21/2016                 CHOLHDLRATIO             2.8                 01/26/2015 Hypertension   This is a chronic problem. The current episode started more than 1 year ago. The problem is controlled. Pertinent negatives include no chest pain, neck pain, palpitations or shortness of breath. Past treatments include beta blockers and diuretics. The current treatment provides significant improvement. There are no compliance problems. Hyperlipidemia   This is a chronic problem. The current episode started more than 1 year ago. The problem is controlled. Recent lipid tests were reviewed and are normal. Pertinent negatives include no chest pain, myalgias or shortness of breath. Current antihyperlipidemic treatment includes statins. The current treatment provides significant improvement of lipids. There are no compliance problems. Review of Systems   Constitutional: Negative. HENT: Negative for congestion, ear pain, rhinorrhea, sneezing and sore throat. Eyes: Negative for visual disturbance. Respiratory: Negative for cough, chest tightness and shortness of breath. Cardiovascular: Negative for chest pain and palpitations. Gastrointestinal: Negative for abdominal pain, blood in stool, constipation, diarrhea and nausea. Genitourinary: Negative for difficulty urinating, dysuria, frequency, menstrual problem and urgency. Musculoskeletal: Negative for arthralgias, joint swelling, myalgias and neck pain. Skin: Negative. Neurological: Negative for syncope. Psychiatric/Behavioral: Negative. Objective:   Physical Exam   Constitutional: She appears well-developed and well-nourished. She is cooperative. Non-toxic appearance. She does not have a sickly appearance. She does not appear ill. HENT:   Head: Atraumatic. Right Ear: Hearing normal.   Left Ear: Hearing normal.   Nose: Nose normal.   Eyes: Conjunctivae are normal.   Neck: Trachea normal and normal range of motion. Neck supple. Carotid bruit is not present. No thyroid mass present.

## 2018-02-09 NOTE — PROGRESS NOTES
After obtaining consent, and per orders of Dr. Odalys Bhatia, injection of Influenza Vacc given in Left deltoid by Marilee Huff. Patient instructed to remain in clinic for 20 minutes afterwards, and to report any adverse reaction to me immediately.

## 2018-02-09 NOTE — PATIENT INSTRUCTIONS
SURVEY:    You may be receiving a survey from [employer] regarding your visit today. Please complete the survey to enable us to provide the highest quality of care to you and your family. If you cannot score us a very good on any question, please call the office to discuss how we could of made your experience a very good one. Thank you. 1. Need for influenza vaccination  [place] was given an influenza vaccine today. - INFLUENZA, QUADV, 3 YRS AND OLDER, IM, PF, PREFILL SYR OR SDV, 0.5ML (FLUZONE QUADV, PF)    2. Controlled type 2 diabetes mellitus with stage 2 chronic kidney disease, without long-term current use of insulin (HCC)  Continue the current medication. HgbA1C with Dr. Sherly Porter labs. - linagliptin (TRADJENTA) 5 MG tablet; Take 1 tablet by mouth daily  Dispense: 42 tablet; Refill: 0    3. Coronary artery disease due to lipid rich plaque  Continue to follow with Dr. Annika Thompson yearly. 4. Benign essential HTN  [place] was instructed to continue her current medication regimen. 5. Gastroesophageal reflux disease without esophagitis  [place] was instructed to continue her current medication regimen. 6. Mixed hyperlipidemia  Continue the current dose of Mevacor. Fasting labs with Dr. Annika Thompson. 7. Vitamin D deficiency  Continue at least 1000 units of vitamin D daily. 8. Osteoporosis, senile  Continue on Calcium. 9. Chronic renal impairment, stage 2 (mild)  NO NSAIDS. Continue to monitor closely. [place] states she no longer wants to have mammograms. [place] was instructed to follow up in the clinic in 6 months for check up or as needed with any medical issues.

## 2018-03-12 DIAGNOSIS — N18.2 CONTROLLED TYPE 2 DIABETES MELLITUS WITH STAGE 2 CHRONIC KIDNEY DISEASE, WITHOUT LONG-TERM CURRENT USE OF INSULIN (HCC): ICD-10-CM

## 2018-03-12 DIAGNOSIS — E11.22 CONTROLLED TYPE 2 DIABETES MELLITUS WITH STAGE 2 CHRONIC KIDNEY DISEASE, WITHOUT LONG-TERM CURRENT USE OF INSULIN (HCC): ICD-10-CM

## 2018-03-19 ENCOUNTER — HOSPITAL ENCOUNTER (OUTPATIENT)
Age: 83
Discharge: HOME | End: 2018-03-19
Payer: MEDICARE

## 2018-03-19 ENCOUNTER — HOSPITAL ENCOUNTER (OUTPATIENT)
Age: 83
Discharge: HOME | End: 2018-03-21
Payer: MEDICARE

## 2018-03-19 ENCOUNTER — HOSPITAL ENCOUNTER (OUTPATIENT)
Dept: GENERAL RADIOLOGY | Age: 83
Discharge: HOME | End: 2018-03-21
Payer: MEDICARE

## 2018-03-19 DIAGNOSIS — I25.10 CORONARY ARTERY DISEASE DUE TO LIPID RICH PLAQUE: ICD-10-CM

## 2018-03-19 DIAGNOSIS — I25.83 CORONARY ARTERY DISEASE DUE TO LIPID RICH PLAQUE: ICD-10-CM

## 2018-03-19 DIAGNOSIS — E11.22 CONTROLLED TYPE 2 DIABETES MELLITUS WITH STAGE 2 CHRONIC KIDNEY DISEASE, WITHOUT LONG-TERM CURRENT USE OF INSULIN (HCC): ICD-10-CM

## 2018-03-19 DIAGNOSIS — I10 BENIGN ESSENTIAL HTN: ICD-10-CM

## 2018-03-19 DIAGNOSIS — E55.9 VITAMIN D DEFICIENCY: ICD-10-CM

## 2018-03-19 DIAGNOSIS — E78.2 MIXED HYPERLIPIDEMIA: ICD-10-CM

## 2018-03-19 DIAGNOSIS — N18.2 CONTROLLED TYPE 2 DIABETES MELLITUS WITH STAGE 2 CHRONIC KIDNEY DISEASE, WITHOUT LONG-TERM CURRENT USE OF INSULIN (HCC): ICD-10-CM

## 2018-03-19 DIAGNOSIS — I51.9 HEART DISEASE: ICD-10-CM

## 2018-03-19 LAB
ABSOLUTE EOS #: 0.1 K/UL (ref 0–0.4)
ABSOLUTE IMMATURE GRANULOCYTE: NORMAL K/UL (ref 0–0.3)
ABSOLUTE LYMPH #: 1.5 K/UL (ref 1–4.8)
ABSOLUTE MONO #: 0.3 K/UL (ref 0–1)
ALBUMIN SERPL-MCNC: 4.4 G/DL (ref 3.5–5.2)
ALBUMIN/GLOBULIN RATIO: ABNORMAL (ref 1–2.5)
ALP BLD-CCNC: 108 U/L (ref 35–104)
ALT SERPL-CCNC: 12 U/L (ref 5–33)
ANION GAP SERPL CALCULATED.3IONS-SCNC: 15 MMOL/L (ref 9–17)
AST SERPL-CCNC: 15 U/L
BASOPHILS # BLD: 1 % (ref 0–2)
BASOPHILS ABSOLUTE: 0 K/UL (ref 0–0.2)
BILIRUB SERPL-MCNC: 0.33 MG/DL (ref 0.3–1.2)
BUN BLDV-MCNC: 31 MG/DL (ref 8–23)
BUN/CREAT BLD: 19 (ref 9–20)
CALCIUM SERPL-MCNC: 10.1 MG/DL (ref 8.6–10.4)
CHLORIDE BLD-SCNC: 97 MMOL/L (ref 98–107)
CHOLESTEROL/HDL RATIO: 3
CHOLESTEROL: 190 MG/DL
CO2: 27 MMOL/L (ref 20–31)
CREAT SERPL-MCNC: 1.63 MG/DL (ref 0.5–0.9)
DIFFERENTIAL TYPE: YES
EKG ATRIAL RATE: 81 BPM
EKG P AXIS: 41 DEGREES
EKG P-R INTERVAL: 170 MS
EKG Q-T INTERVAL: 322 MS
EKG QRS DURATION: 64 MS
EKG QTC CALCULATION (BAZETT): 374 MS
EKG R AXIS: 32 DEGREES
EKG T AXIS: 44 DEGREES
EKG VENTRICULAR RATE: 81 BPM
EOSINOPHILS RELATIVE PERCENT: 2 % (ref 0–5)
ESTIMATED AVERAGE GLUCOSE: 117 MG/DL
GFR AFRICAN AMERICAN: 36 ML/MIN
GFR NON-AFRICAN AMERICAN: 30 ML/MIN
GFR SERPL CREATININE-BSD FRML MDRD: ABNORMAL ML/MIN/{1.73_M2}
GFR SERPL CREATININE-BSD FRML MDRD: ABNORMAL ML/MIN/{1.73_M2}
GLUCOSE BLD-MCNC: 102 MG/DL (ref 70–99)
HBA1C MFR BLD: 5.7 % (ref 4.8–5.9)
HCT VFR BLD CALC: 39.8 % (ref 36–46)
HDLC SERPL-MCNC: 63 MG/DL
HEMOGLOBIN: 13.5 G/DL (ref 12–16)
IMMATURE GRANULOCYTES: NORMAL %
LDL CHOLESTEROL: 95 MG/DL (ref 0–130)
LYMPHOCYTES # BLD: 26 % (ref 15–40)
MAGNESIUM: 1.9 MG/DL (ref 1.6–2.6)
MCH RBC QN AUTO: 29.9 PG (ref 26–34)
MCHC RBC AUTO-ENTMCNC: 33.9 G/DL (ref 31–37)
MCV RBC AUTO: 88.3 FL (ref 80–100)
MONOCYTES # BLD: 6 % (ref 4–8)
NRBC AUTOMATED: NORMAL PER 100 WBC
PATIENT FASTING?: YES
PDW BLD-RTO: 13.3 % (ref 12.1–15.2)
PLATELET # BLD: 214 K/UL (ref 140–450)
PLATELET ESTIMATE: NORMAL
PMV BLD AUTO: NORMAL FL (ref 6–12)
POTASSIUM SERPL-SCNC: 3.6 MMOL/L (ref 3.7–5.3)
RBC # BLD: 4.51 M/UL (ref 4–5.2)
RBC # BLD: NORMAL 10*6/UL
SEG NEUTROPHILS: 65 % (ref 47–75)
SEGMENTED NEUTROPHILS ABSOLUTE COUNT: 3.9 K/UL (ref 2.5–7)
SODIUM BLD-SCNC: 139 MMOL/L (ref 135–144)
TOTAL PROTEIN: 7.7 G/DL (ref 6.4–8.3)
TRIGL SERPL-MCNC: 160 MG/DL
TSH SERPL DL<=0.05 MIU/L-ACNC: 1.85 MIU/L (ref 0.3–5)
VITAMIN D 25-HYDROXY: 52 NG/ML (ref 30–100)
VLDLC SERPL CALC-MCNC: ABNORMAL MG/DL (ref 1–30)
WBC # BLD: 5.9 K/UL (ref 3.5–11)
WBC # BLD: NORMAL 10*3/UL

## 2018-03-19 PROCEDURE — 83735 ASSAY OF MAGNESIUM: CPT

## 2018-03-19 PROCEDURE — 80053 COMPREHEN METABOLIC PANEL: CPT

## 2018-03-19 PROCEDURE — 82306 VITAMIN D 25 HYDROXY: CPT

## 2018-03-19 PROCEDURE — 93005 ELECTROCARDIOGRAM TRACING: CPT

## 2018-03-19 PROCEDURE — 80061 LIPID PANEL: CPT

## 2018-03-19 PROCEDURE — 85025 COMPLETE CBC W/AUTO DIFF WBC: CPT

## 2018-03-19 PROCEDURE — 36415 COLL VENOUS BLD VENIPUNCTURE: CPT

## 2018-03-19 PROCEDURE — 83036 HEMOGLOBIN GLYCOSYLATED A1C: CPT

## 2018-03-19 PROCEDURE — 71046 X-RAY EXAM CHEST 2 VIEWS: CPT

## 2018-03-19 PROCEDURE — 84443 ASSAY THYROID STIM HORMONE: CPT

## 2018-03-23 ENCOUNTER — OFFICE VISIT (OUTPATIENT)
Dept: CARDIOLOGY CLINIC | Age: 83
End: 2018-03-23
Payer: MEDICARE

## 2018-03-23 VITALS
SYSTOLIC BLOOD PRESSURE: 130 MMHG | HEART RATE: 88 BPM | OXYGEN SATURATION: 98 % | DIASTOLIC BLOOD PRESSURE: 80 MMHG | BODY MASS INDEX: 26.15 KG/M2 | WEIGHT: 162 LBS

## 2018-03-23 DIAGNOSIS — I25.83 CORONARY ARTERY DISEASE DUE TO LIPID RICH PLAQUE: ICD-10-CM

## 2018-03-23 DIAGNOSIS — I51.9 HEART DISEASE: ICD-10-CM

## 2018-03-23 DIAGNOSIS — E55.9 VITAMIN D DEFICIENCY DISEASE: ICD-10-CM

## 2018-03-23 DIAGNOSIS — E78.2 MIXED HYPERLIPIDEMIA: ICD-10-CM

## 2018-03-23 DIAGNOSIS — E11.22 CONTROLLED TYPE 2 DIABETES MELLITUS WITH STAGE 2 CHRONIC KIDNEY DISEASE, WITHOUT LONG-TERM CURRENT USE OF INSULIN (HCC): ICD-10-CM

## 2018-03-23 DIAGNOSIS — I25.10 CORONARY ARTERY DISEASE DUE TO LIPID RICH PLAQUE: ICD-10-CM

## 2018-03-23 DIAGNOSIS — E55.9 VITAMIN D DEFICIENCY: Primary | ICD-10-CM

## 2018-03-23 DIAGNOSIS — N18.2 CONTROLLED TYPE 2 DIABETES MELLITUS WITH STAGE 2 CHRONIC KIDNEY DISEASE, WITHOUT LONG-TERM CURRENT USE OF INSULIN (HCC): ICD-10-CM

## 2018-03-23 PROCEDURE — G8598 ASA/ANTIPLAT THER USED: HCPCS | Performed by: INTERNAL MEDICINE

## 2018-03-23 PROCEDURE — 4040F PNEUMOC VAC/ADMIN/RCVD: CPT | Performed by: INTERNAL MEDICINE

## 2018-03-23 PROCEDURE — 99214 OFFICE O/P EST MOD 30 MIN: CPT | Performed by: INTERNAL MEDICINE

## 2018-03-23 PROCEDURE — 1090F PRES/ABSN URINE INCON ASSESS: CPT | Performed by: INTERNAL MEDICINE

## 2018-03-23 PROCEDURE — 1123F ACP DISCUSS/DSCN MKR DOCD: CPT | Performed by: INTERNAL MEDICINE

## 2018-03-23 PROCEDURE — G8427 DOCREV CUR MEDS BY ELIG CLIN: HCPCS | Performed by: INTERNAL MEDICINE

## 2018-03-23 PROCEDURE — G8419 CALC BMI OUT NRM PARAM NOF/U: HCPCS | Performed by: INTERNAL MEDICINE

## 2018-03-23 PROCEDURE — 1036F TOBACCO NON-USER: CPT | Performed by: INTERNAL MEDICINE

## 2018-03-23 PROCEDURE — G8482 FLU IMMUNIZE ORDER/ADMIN: HCPCS | Performed by: INTERNAL MEDICINE

## 2018-04-26 DIAGNOSIS — F32.A DEPRESSION: ICD-10-CM

## 2018-04-26 DIAGNOSIS — I10 BENIGN ESSENTIAL HTN: ICD-10-CM

## 2018-04-26 DIAGNOSIS — K21.00 GASTROESOPHAGEAL REFLUX DISEASE WITH ESOPHAGITIS: ICD-10-CM

## 2018-04-26 RX ORDER — LISINOPRIL 10 MG/1
TABLET ORAL
Qty: 30 TABLET | Refills: 3 | Status: SHIPPED | OUTPATIENT
Start: 2018-04-26 | End: 2018-08-28 | Stop reason: SDUPTHER

## 2018-04-26 RX ORDER — LANSOPRAZOLE 30 MG/1
CAPSULE, DELAYED RELEASE ORAL
Qty: 30 CAPSULE | Refills: 3 | Status: SHIPPED | OUTPATIENT
Start: 2018-04-26 | End: 2018-08-28 | Stop reason: SDUPTHER

## 2018-04-26 RX ORDER — PAROXETINE HYDROCHLORIDE 20 MG/1
TABLET, FILM COATED ORAL
Qty: 30 TABLET | Refills: 5 | Status: SHIPPED | OUTPATIENT
Start: 2018-04-26 | End: 2018-10-25 | Stop reason: SDUPTHER

## 2018-04-26 RX ORDER — TRIAMTERENE AND HYDROCHLOROTHIAZIDE 37.5; 25 MG/1; MG/1
CAPSULE ORAL
Qty: 30 CAPSULE | Refills: 3 | Status: SHIPPED | OUTPATIENT
Start: 2018-04-26 | End: 2018-08-28 | Stop reason: SDUPTHER

## 2018-05-29 DIAGNOSIS — N18.2 CONTROLLED TYPE 2 DIABETES MELLITUS WITH STAGE 2 CHRONIC KIDNEY DISEASE, WITHOUT LONG-TERM CURRENT USE OF INSULIN (HCC): ICD-10-CM

## 2018-05-29 DIAGNOSIS — E11.22 CONTROLLED TYPE 2 DIABETES MELLITUS WITH STAGE 2 CHRONIC KIDNEY DISEASE, WITHOUT LONG-TERM CURRENT USE OF INSULIN (HCC): ICD-10-CM

## 2018-06-22 DIAGNOSIS — E11.22 CONTROLLED TYPE 2 DIABETES MELLITUS WITH STAGE 2 CHRONIC KIDNEY DISEASE, WITHOUT LONG-TERM CURRENT USE OF INSULIN (HCC): ICD-10-CM

## 2018-06-22 DIAGNOSIS — N18.2 CONTROLLED TYPE 2 DIABETES MELLITUS WITH STAGE 2 CHRONIC KIDNEY DISEASE, WITHOUT LONG-TERM CURRENT USE OF INSULIN (HCC): ICD-10-CM

## 2018-07-17 DIAGNOSIS — N18.2 CONTROLLED TYPE 2 DIABETES MELLITUS WITH STAGE 2 CHRONIC KIDNEY DISEASE, WITHOUT LONG-TERM CURRENT USE OF INSULIN (HCC): ICD-10-CM

## 2018-07-17 DIAGNOSIS — E11.22 CONTROLLED TYPE 2 DIABETES MELLITUS WITH STAGE 2 CHRONIC KIDNEY DISEASE, WITHOUT LONG-TERM CURRENT USE OF INSULIN (HCC): ICD-10-CM

## 2018-08-10 ENCOUNTER — OFFICE VISIT (OUTPATIENT)
Dept: FAMILY MEDICINE CLINIC | Age: 83
End: 2018-08-10
Payer: MEDICARE

## 2018-08-10 VITALS
HEIGHT: 65 IN | BODY MASS INDEX: 27.16 KG/M2 | WEIGHT: 163 LBS | SYSTOLIC BLOOD PRESSURE: 115 MMHG | HEART RATE: 80 BPM | DIASTOLIC BLOOD PRESSURE: 61 MMHG

## 2018-08-10 DIAGNOSIS — M24.541 CONTRACTURE OF FINGER JOINT, RIGHT: Primary | ICD-10-CM

## 2018-08-10 DIAGNOSIS — E11.22 CONTROLLED TYPE 2 DIABETES MELLITUS WITH STAGE 2 CHRONIC KIDNEY DISEASE, WITHOUT LONG-TERM CURRENT USE OF INSULIN (HCC): ICD-10-CM

## 2018-08-10 DIAGNOSIS — K21.9 GASTROESOPHAGEAL REFLUX DISEASE WITHOUT ESOPHAGITIS: ICD-10-CM

## 2018-08-10 DIAGNOSIS — F32.5 MAJOR DEPRESSIVE DISORDER WITH SINGLE EPISODE, IN FULL REMISSION (HCC): ICD-10-CM

## 2018-08-10 DIAGNOSIS — I25.83 CORONARY ARTERY DISEASE DUE TO LIPID RICH PLAQUE: ICD-10-CM

## 2018-08-10 DIAGNOSIS — E55.9 VITAMIN D DEFICIENCY: ICD-10-CM

## 2018-08-10 DIAGNOSIS — N18.30 CHRONIC RENAL IMPAIRMENT, STAGE 3 (MODERATE) (HCC): ICD-10-CM

## 2018-08-10 DIAGNOSIS — N18.2 CONTROLLED TYPE 2 DIABETES MELLITUS WITH STAGE 2 CHRONIC KIDNEY DISEASE, WITHOUT LONG-TERM CURRENT USE OF INSULIN (HCC): ICD-10-CM

## 2018-08-10 DIAGNOSIS — E78.2 MIXED HYPERLIPIDEMIA: ICD-10-CM

## 2018-08-10 DIAGNOSIS — I25.10 CORONARY ARTERY DISEASE DUE TO LIPID RICH PLAQUE: ICD-10-CM

## 2018-08-10 DIAGNOSIS — I10 BENIGN ESSENTIAL HTN: ICD-10-CM

## 2018-08-10 PROCEDURE — G8510 SCR DEP NEG, NO PLAN REQD: HCPCS | Performed by: INTERNAL MEDICINE

## 2018-08-10 PROCEDURE — 1090F PRES/ABSN URINE INCON ASSESS: CPT | Performed by: INTERNAL MEDICINE

## 2018-08-10 PROCEDURE — G8598 ASA/ANTIPLAT THER USED: HCPCS | Performed by: INTERNAL MEDICINE

## 2018-08-10 PROCEDURE — G8427 DOCREV CUR MEDS BY ELIG CLIN: HCPCS | Performed by: INTERNAL MEDICINE

## 2018-08-10 PROCEDURE — 1036F TOBACCO NON-USER: CPT | Performed by: INTERNAL MEDICINE

## 2018-08-10 PROCEDURE — 99214 OFFICE O/P EST MOD 30 MIN: CPT | Performed by: INTERNAL MEDICINE

## 2018-08-10 PROCEDURE — 4040F PNEUMOC VAC/ADMIN/RCVD: CPT | Performed by: INTERNAL MEDICINE

## 2018-08-10 PROCEDURE — G8419 CALC BMI OUT NRM PARAM NOF/U: HCPCS | Performed by: INTERNAL MEDICINE

## 2018-08-10 PROCEDURE — 1123F ACP DISCUSS/DSCN MKR DOCD: CPT | Performed by: INTERNAL MEDICINE

## 2018-08-10 PROCEDURE — 1101F PT FALLS ASSESS-DOCD LE1/YR: CPT | Performed by: INTERNAL MEDICINE

## 2018-08-10 ASSESSMENT — ENCOUNTER SYMPTOMS
BLOOD IN STOOL: 0
COUGH: 0
DIARRHEA: 0
RHINORRHEA: 0
SHORTNESS OF BREATH: 0
SORE THROAT: 0
NAUSEA: 0
CONSTIPATION: 0
CHEST TIGHTNESS: 0
ABDOMINAL PAIN: 0

## 2018-08-10 ASSESSMENT — PATIENT HEALTH QUESTIONNAIRE - PHQ9
2. FEELING DOWN, DEPRESSED OR HOPELESS: 0
1. LITTLE INTEREST OR PLEASURE IN DOING THINGS: 0
SUM OF ALL RESPONSES TO PHQ QUESTIONS 1-9: 0
SUM OF ALL RESPONSES TO PHQ QUESTIONS 1-9: 0
SUM OF ALL RESPONSES TO PHQ9 QUESTIONS 1 & 2: 0

## 2018-08-10 NOTE — PROGRESS NOTES
Chronic Disease Visit Information    BP Readings from Last 3 Encounters:   03/23/18 130/80   02/09/18 136/75   07/25/17 136/74          Hemoglobin A1C (%)   Date Value   03/19/2018 5.7   01/16/2017 5.5   03/21/2016 5.9     Microalb/Crt. Ratio (mcg/mg creat)   Date Value   08/07/2013 132     LDL Cholesterol (mg/dL)   Date Value   03/19/2018 95     HDL (mg/dL)   Date Value   03/19/2018 63     BUN (mg/dL)   Date Value   03/19/2018 31 (H)     CREATININE (mg/dL)   Date Value   03/19/2018 1.63 (H)     Glucose (mg/dL)   Date Value   03/19/2018 102 (H)   04/13/2012 114 (H)            Have you changed or started any medications since your last visit including any over-the-counter medicines, vitamins, or herbal medicines? no   Are you having any side effects from any of your medications? -  no  Have you stopped taking any of your medications? Is so, why? -  no    Have you seen any other physician or provider since your last visit? No  Have you had any other diagnostic tests since your last visit? No  Have you been seen in the emergency room and/or had an admission to a hospital since we last saw you? No  Have you had your annual diabetic retinal (eye) exam? No  Have you had your routine dental cleaning in the past 6 months? no    Have you activated your [employer] account? If not, what are your barriers?  Yes     Patient Care Team:  Amada Walter MD as PCP - General (Internal Medicine)  Amada Walter MD as PCP - S Attributed Provider  Shanae Duran (Oncology)         Medical History Review  Past Medical, Family, and Social History reviewed and does contribute to the patient presenting condition    Health Maintenance   Topic Date Due    DTaP/Tdap/Td vaccine (1 - Tdap) 10/20/1950    Shingles Vaccine (1 of 2 - 2 Dose Series) 10/20/1981    Flu vaccine (1) 09/01/2018    Potassium monitoring  03/19/2019    Creatinine monitoring  03/19/2019    Pneumococcal low/med risk  Completed

## 2018-08-10 NOTE — PROGRESS NOTES
LABA1C                   5.7                 03/19/2018            Lab Results       Component                Value               Date                       EAG                      117                 03/19/2018              Lab Results       Component                Value               Date                       CHOL                     190                 03/19/2018                 CHOL                     187                 03/20/2017                 CHOL                     175                 03/21/2016            Lab Results       Component                Value               Date                       TRIG                     160 (H)             03/19/2018                 TRIG                     180 (H)             03/20/2017                 TRIG                     236 (H)             03/21/2016            Lab Results       Component                Value               Date                       HDL                      63                  03/19/2018                 HDL                      68                  03/20/2017                 HDL                      61                  03/21/2016            Lab Results       Component                Value               Date                       LDLCHOLESTEROL           95                  03/19/2018                 LDLCHOLESTEROL           83                  03/20/2017                 LDLCHOLESTEROL           67                  03/21/2016            Lab Results       Component                Value               Date                       VLDL                     NOT REPORTED        03/19/2018                 VLDL                     36 (H)              03/20/2017                 VLDL                     47 (H)              03/21/2016            Lab Results       Component                Value               Date                       CHOLHDLRATIO             3.0                 03/19/2018                 CHOLHDLRATIO             2.8                 03/20/2017 arthralgias. Negative for joint swelling, myalgias and neck pain. Skin: Negative. Neurological: Negative for syncope. Psychiatric/Behavioral: Negative. Objective:   Physical Exam   Constitutional: She appears well-developed and well-nourished. She is cooperative. Non-toxic appearance. She does not have a sickly appearance. She does not appear ill. HENT:   Head: Atraumatic. Right Ear: Hearing normal.   Left Ear: Hearing normal.   Nose: Nose normal.   Eyes: Conjunctivae are normal.   Neck: Trachea normal and normal range of motion. Neck supple. Carotid bruit is not present. No thyroid mass present. Cardiovascular: Normal rate, regular rhythm, S1 normal, S2 normal and normal heart sounds. No murmur heard. Pulmonary/Chest: Effort normal and breath sounds normal. No respiratory distress. Abdominal: Bowel sounds are normal. There is no tenderness. Musculoskeletal: Normal range of motion. She exhibits no edema. Contracture of the right 5th finger. Lymphadenopathy:     She has no cervical adenopathy. Neurological: She is alert. She is not disoriented. Skin: Skin is warm and dry. No rash noted. No pallor. Psychiatric: She has a normal mood and affect. Her speech is normal and behavior is normal. Judgment and thought content normal. Cognition and memory are normal.   Nursing note and vitals reviewed. Assessment:       Diagnosis Orders   1. Contracture of finger joint, right  External Referral To Orthopedic Surgery   2. Controlled type 2 diabetes mellitus with stage 2 chronic kidney disease, without long-term current use of insulin ([address]     3. Vitamin D deficiency     4. Mixed hyperlipidemia     5. Gastroesophageal reflux disease without esophagitis     6. Coronary artery disease due to lipid rich plaque     7. Benign essential HTN     8. Major depressive disorder with single episode, in full remission ([address]     9.  Chronic renal impairment, stage 3 (moderate)             Plan: 1. Contracture of finger joint, right  Refer to Orthopedics to evaluate for surgery. - External Referral To Orthopedic Surgery    2. Controlled type 2 diabetes mellitus with stage 2 chronic kidney disease, without long-term current use of insulin ([facility] [address]  [place] was instructed to continue her current medication regimen. HgbA1C with Dr. Karin Dean labs. 3. Vitamin D deficiency  Continue on vitamin D replacement. 4. Mixed hyperlipidemia  [place] was instructed to continue her current medication regimen. Fasting labs through Dr. Nuria Rodriguez. 5. Gastroesophageal reflux disease without esophagitis  Continue the current medication. 6. Coronary artery disease due to lipid rich plaque  Continue to follow with Dr. Nuria Rodriguez yearly. 7. Benign essential HTN  [place] was instructed to continue her current medication regimen. 8. Major depressive disorder with single episode, in full remission ([facility] [address]  Continue on Paxil daily. 9. Chronic renal impairment, stage 3 (moderate)  Avoid NSAIDS    [place] was instructed to follow up in the clinic in 6 months for check up or as needed with any medical issues.                       Luca Jean MD

## 2018-08-13 DIAGNOSIS — E11.22 CONTROLLED TYPE 2 DIABETES MELLITUS WITH STAGE 2 CHRONIC KIDNEY DISEASE, WITHOUT LONG-TERM CURRENT USE OF INSULIN (HCC): ICD-10-CM

## 2018-08-13 DIAGNOSIS — N18.2 CONTROLLED TYPE 2 DIABETES MELLITUS WITH STAGE 2 CHRONIC KIDNEY DISEASE, WITHOUT LONG-TERM CURRENT USE OF INSULIN (HCC): ICD-10-CM

## 2018-08-13 NOTE — TELEPHONE ENCOUNTER
renal insufficiency)     Controlled type 2 diabetes mellitus with stage 2 chronic kidney disease, without long-term current use of insulin (HCC)     Colon cancer screening     Major depressive disorder with single episode, in full remission [facility]

## 2018-08-23 ENCOUNTER — HOSPITAL ENCOUNTER (OUTPATIENT)
Age: 83
Discharge: HOME | End: 2018-08-25
Payer: MEDICARE

## 2018-08-23 ENCOUNTER — HOSPITAL ENCOUNTER (OUTPATIENT)
Dept: GENERAL RADIOLOGY | Age: 83
Discharge: HOME | End: 2018-08-25
Payer: MEDICARE

## 2018-08-23 DIAGNOSIS — M24.541 CONTRACTURE, RIGHT HAND: ICD-10-CM

## 2018-08-23 PROCEDURE — 73130 X-RAY EXAM OF HAND: CPT

## 2018-08-28 DIAGNOSIS — K21.00 GASTROESOPHAGEAL REFLUX DISEASE WITH ESOPHAGITIS: ICD-10-CM

## 2018-08-28 DIAGNOSIS — I10 BENIGN ESSENTIAL HTN: ICD-10-CM

## 2018-08-28 RX ORDER — LANSOPRAZOLE 30 MG/1
CAPSULE, DELAYED RELEASE ORAL
Qty: 30 CAPSULE | Refills: 5 | Status: SHIPPED | OUTPATIENT
Start: 2018-08-28 | End: 2019-03-05 | Stop reason: SDUPTHER

## 2018-08-28 RX ORDER — LISINOPRIL 10 MG/1
TABLET ORAL
Qty: 30 TABLET | Refills: 5 | Status: SHIPPED | OUTPATIENT
Start: 2018-08-28 | End: 2019-03-05 | Stop reason: SDUPTHER

## 2018-08-28 RX ORDER — TRIAMTERENE AND HYDROCHLOROTHIAZIDE 37.5; 25 MG/1; MG/1
CAPSULE ORAL
Qty: 30 CAPSULE | Refills: 5 | Status: SHIPPED | OUTPATIENT
Start: 2018-08-28 | End: 2019-03-05 | Stop reason: SDUPTHER

## 2018-08-28 NOTE — TELEPHONE ENCOUNTER
renal insufficiency)     Controlled type 2 diabetes mellitus with stage 2 chronic kidney disease, without long-term current use of insulin (HCC)     Colon cancer screening     Major depressive disorder with single episode[facility]

## 2018-09-14 DIAGNOSIS — N18.2 CONTROLLED TYPE 2 DIABETES MELLITUS WITH STAGE 2 CHRONIC KIDNEY DISEASE, WITHOUT LONG-TERM CURRENT USE OF INSULIN (HCC): ICD-10-CM

## 2018-09-14 DIAGNOSIS — E11.22 CONTROLLED TYPE 2 DIABETES MELLITUS WITH STAGE 2 CHRONIC KIDNEY DISEASE, WITHOUT LONG-TERM CURRENT USE OF INSULIN (HCC): ICD-10-CM

## 2018-09-26 PROBLEM — Z12.11 COLON CANCER SCREENING: Status: RESOLVED | Noted: 2017-07-10 | Resolved: 2018-09-26

## 2018-10-07 DIAGNOSIS — E78.2 MIXED HYPERLIPIDEMIA: ICD-10-CM

## 2018-10-08 RX ORDER — LOVASTATIN 40 MG/1
TABLET ORAL
Qty: 60 TABLET | Refills: 5 | Status: SHIPPED | OUTPATIENT
Start: 2018-10-08 | End: 2019-10-16 | Stop reason: SDUPTHER

## 2018-10-08 NOTE — TELEPHONE ENCOUNTER
Health Maintenance   Topic Date Due    DTaP/Tdap/Td vaccine (1 - Tdap) 10/20/1950    Shingles Vaccine (1 of 2 - 2 Dose Series) 10/20/1981    Flu vaccine (1) 09/01/2018    Potassium monitoring  03/19/2019    Creatinine monitoring  03/19/2019    Pneumococcal low/med risk  Completed             (applicable per patient's age: Cancer Screenings, Depression Screening, Fall Risk Screening, Immunizations)    Hemoglobin A1C (%)   Date Value   03/19/2018 5.7   01/16/2017 5.5   03/21/2016 5.9     Microalb/Crt.  Ratio (mcg/mg creat)   Date Value   08/07/2013 132     LDL Cholesterol (mg/dL)   Date Value   03/19/2018 95     AST (U/L)   Date Value   03/19/2018 15     ALT (U/L)   Date Value   03/19/2018 12     BUN (mg/dL)   Date Value   03/19/2018 31 (H)      (goal A1C is < 7)   (goal LDL is <100) need 30-50% reduction from baseline     BP Readings from Last 3 Encounters:   08/10/18 115/61   03/23/18 130/80   02/09/18 136/75    (goal /80)      All Future Testing planned in [facility]:  Lab Frequency Next Occurrence   TSH with Reflex Once 04/23/2019   CBC Auto Differential Once 04/23/2019   Comprehensive Metabolic Panel Once 51/17/1324   Vitamin D 25 Hydroxy Once 04/23/2019   Lipid Panel Once 04/23/2019   Magnesium Once 04/23/2019   EKG 12 Lead Once 04/23/2019   XR CHEST STANDARD (2 VW) Once 04/23/2019   Hemoglobin A1C Once 04/23/2019       Next Visit Date:  Future Appointments  Date Time Provider Pradeep Coello   2/21/2019 11:00 AM MD Xavier Vaughn  [facility]   3/22/2019 10:30 AM MD Kristin Gan [facility]            Patient Active Problem List:     Vitamin D deficiency     Osteoporosis, senile     Mixed hyperlipidemia     History of colon cancer     GERD (gastroesophageal reflux disease)     CAD (coronary artery disease)     Benign essential HTN     Hypercholesterolemia     Esophageal reflux     Cancer ([address]     Heart disease     Osteoarthrosis     Colon cancer ([address]     Depression     CRI (chronic renal insufficiency)     Controlled type 2 diabetes mellitus with stage 2 chronic kidney disease, without long-term current use of insulin (HCC)     Major depressive disorder with single episode, [facility] full remission [facility]

## 2018-10-11 DIAGNOSIS — E11.22 CONTROLLED TYPE 2 DIABETES MELLITUS WITH STAGE 2 CHRONIC KIDNEY DISEASE, WITHOUT LONG-TERM CURRENT USE OF INSULIN (HCC): ICD-10-CM

## 2018-10-11 DIAGNOSIS — N18.2 CONTROLLED TYPE 2 DIABETES MELLITUS WITH STAGE 2 CHRONIC KIDNEY DISEASE, WITHOUT LONG-TERM CURRENT USE OF INSULIN (HCC): ICD-10-CM

## 2018-10-25 DIAGNOSIS — F32.A DEPRESSION: ICD-10-CM

## 2018-10-25 RX ORDER — PAROXETINE HYDROCHLORIDE 20 MG/1
TABLET, FILM COATED ORAL
Qty: 30 TABLET | Refills: 5 | Status: SHIPPED | OUTPATIENT
Start: 2018-10-25 | End: 2019-04-16 | Stop reason: SDUPTHER

## 2018-11-12 DIAGNOSIS — N18.2 CONTROLLED TYPE 2 DIABETES MELLITUS WITH STAGE 2 CHRONIC KIDNEY DISEASE, WITHOUT LONG-TERM CURRENT USE OF INSULIN (HCC): ICD-10-CM

## 2018-11-12 DIAGNOSIS — E11.22 CONTROLLED TYPE 2 DIABETES MELLITUS WITH STAGE 2 CHRONIC KIDNEY DISEASE, WITHOUT LONG-TERM CURRENT USE OF INSULIN (HCC): ICD-10-CM

## 2018-11-12 NOTE — TELEPHONE ENCOUNTER
Health Maintenance   Topic Date Due    DTaP/Tdap/Td vaccine (1 - Tdap) 10[birth year]20/1950    Shingles Vaccine (1 of 2 - 2 Dose Series) 10/20/1981    Flu vaccine (1) 09/01/2018    Potassium monitoring  03/19/2019    Creatinine monitoring  03/19/2019    Pneumococcal low/med risk  Completed             (applicable per patient's age: Cancer Screenings, Depression Screening, Fall Risk Screening, Immunizations)    Hemoglobin A1C (%)   Date Value   03/19/2018 5.7   01/16/2017 5.5   03/21/2016 5.9     Microalb/Crt.  Ratio (mcg/mg creat)   Date Value   08/07/2013 132     LDL Cholesterol (mg/dL)   Date Value   03/19/2018 95     AST (U/L)   Date Value   03/19/2018 15     ALT (U/L)   Date Value   03/19/2018 12     BUN (mg/dL)   Date Value   03/19/2018 31 (H)      (goal A1C is < 7)   (goal LDL is <100) need 30-50% reduction from baseline     BP Readings from Last 3 Encounters:   08/10/18 115/61   03/23/18 130/80   02/09/18 136/75    (goal /80)      All Future Testing planned in [facility]:  Lab Frequency Next Occurrence   TSH with Reflex Once 04/23/2019   CBC Auto Differential Once 04/23/2019   Comprehensive Metabolic Panel Once 65/18/1518   Vitamin D 25 Hydroxy Once 04/23/2019   Lipid Panel Once 04/23/2019   Magnesium Once 04/23/2019   EKG 12 Lead Once 04/23/2019   XR CHEST STANDARD (2 VW) Once 04/23/2019   Hemoglobin A1C Once 04/23/2019       Next Visit Date:  Future Appointments  Date Time Provider Pradeep Coello   2/21/2019 11:00 AM MD Xavier Martínez [facility]   3/22/2019 10:30 AM Patrice Montoya MD [facility]            Patient Active Problem List:     Vitamin D deficiency     Osteoporosis, senile     Mixed hyperlipidemia     History of colon cancer     GERD (gastroesophageal reflux disease)     CAD (coronary artery disease)     Benign essential HTN     Hypercholesterolemia     Esophageal reflux     Cancer ([address]     Heart disease     Osteoarthrosis     Colon cancer ([facility]     Depression     CRI (chronic renal insufficiency)     Controlled type 2 diabetes mellitus with stage 2 chronic kidney disease, without long-term current use of insulin (HCC)     Major depressive disorder with single episode, in full remission [facility]

## 2019-01-03 DIAGNOSIS — E11.22 CONTROLLED TYPE 2 DIABETES MELLITUS WITH STAGE 2 CHRONIC KIDNEY DISEASE, WITHOUT LONG-TERM CURRENT USE OF INSULIN (HCC): ICD-10-CM

## 2019-01-03 DIAGNOSIS — N18.2 CONTROLLED TYPE 2 DIABETES MELLITUS WITH STAGE 2 CHRONIC KIDNEY DISEASE, WITHOUT LONG-TERM CURRENT USE OF INSULIN (HCC): ICD-10-CM

## 2019-01-22 DIAGNOSIS — E11.22 CONTROLLED TYPE 2 DIABETES MELLITUS WITH STAGE 2 CHRONIC KIDNEY DISEASE, WITHOUT LONG-TERM CURRENT USE OF INSULIN (HCC): ICD-10-CM

## 2019-01-22 DIAGNOSIS — N18.2 CONTROLLED TYPE 2 DIABETES MELLITUS WITH STAGE 2 CHRONIC KIDNEY DISEASE, WITHOUT LONG-TERM CURRENT USE OF INSULIN (HCC): ICD-10-CM

## 2019-02-08 ENCOUNTER — NURSE ONLY (OUTPATIENT)
Dept: FAMILY MEDICINE CLINIC | Age: 84
End: 2019-02-08
Payer: MEDICARE

## 2019-02-08 DIAGNOSIS — Z23 NEED FOR INFLUENZA VACCINATION: Primary | ICD-10-CM

## 2019-02-08 PROCEDURE — 90686 IIV4 VACC NO PRSV 0.5 ML IM: CPT | Performed by: INTERNAL MEDICINE

## 2019-02-08 PROCEDURE — G0008 ADMIN INFLUENZA VIRUS VAC: HCPCS | Performed by: INTERNAL MEDICINE

## 2019-02-21 ENCOUNTER — OFFICE VISIT (OUTPATIENT)
Dept: FAMILY MEDICINE CLINIC | Age: 84
End: 2019-02-21
Payer: MEDICARE

## 2019-02-21 VITALS
WEIGHT: 161 LBS | HEIGHT: 65 IN | SYSTOLIC BLOOD PRESSURE: 132 MMHG | DIASTOLIC BLOOD PRESSURE: 81 MMHG | BODY MASS INDEX: 26.82 KG/M2 | HEART RATE: 89 BPM

## 2019-02-21 DIAGNOSIS — N18.2 CONTROLLED TYPE 2 DIABETES MELLITUS WITH STAGE 2 CHRONIC KIDNEY DISEASE, WITHOUT LONG-TERM CURRENT USE OF INSULIN (HCC): Primary | ICD-10-CM

## 2019-02-21 DIAGNOSIS — I25.83 CORONARY ARTERY DISEASE DUE TO LIPID RICH PLAQUE: ICD-10-CM

## 2019-02-21 DIAGNOSIS — E55.9 VITAMIN D DEFICIENCY: ICD-10-CM

## 2019-02-21 DIAGNOSIS — K21.9 GASTROESOPHAGEAL REFLUX DISEASE WITHOUT ESOPHAGITIS: ICD-10-CM

## 2019-02-21 DIAGNOSIS — C18.9 MALIGNANT NEOPLASM OF COLON, UNSPECIFIED PART OF COLON (HCC): ICD-10-CM

## 2019-02-21 DIAGNOSIS — E78.00 HYPERCHOLESTEROLEMIA: ICD-10-CM

## 2019-02-21 DIAGNOSIS — N18.30 CHRONIC RENAL IMPAIRMENT, STAGE 3 (MODERATE) (HCC): ICD-10-CM

## 2019-02-21 DIAGNOSIS — I25.10 CORONARY ARTERY DISEASE DUE TO LIPID RICH PLAQUE: ICD-10-CM

## 2019-02-21 DIAGNOSIS — F32.5 MAJOR DEPRESSIVE DISORDER WITH SINGLE EPISODE, IN FULL REMISSION (HCC): ICD-10-CM

## 2019-02-21 DIAGNOSIS — E78.2 MIXED HYPERLIPIDEMIA: ICD-10-CM

## 2019-02-21 DIAGNOSIS — E11.22 CONTROLLED TYPE 2 DIABETES MELLITUS WITH STAGE 2 CHRONIC KIDNEY DISEASE, WITHOUT LONG-TERM CURRENT USE OF INSULIN (HCC): Primary | ICD-10-CM

## 2019-02-21 DIAGNOSIS — M81.0 OSTEOPOROSIS, SENILE: ICD-10-CM

## 2019-02-21 PROCEDURE — 99214 OFFICE O/P EST MOD 30 MIN: CPT | Performed by: INTERNAL MEDICINE

## 2019-02-21 PROCEDURE — 1123F ACP DISCUSS/DSCN MKR DOCD: CPT | Performed by: INTERNAL MEDICINE

## 2019-02-21 PROCEDURE — 1036F TOBACCO NON-USER: CPT | Performed by: INTERNAL MEDICINE

## 2019-02-21 PROCEDURE — 1090F PRES/ABSN URINE INCON ASSESS: CPT | Performed by: INTERNAL MEDICINE

## 2019-02-21 PROCEDURE — G8482 FLU IMMUNIZE ORDER/ADMIN: HCPCS | Performed by: INTERNAL MEDICINE

## 2019-02-21 PROCEDURE — G8419 CALC BMI OUT NRM PARAM NOF/U: HCPCS | Performed by: INTERNAL MEDICINE

## 2019-02-21 PROCEDURE — G8598 ASA/ANTIPLAT THER USED: HCPCS | Performed by: INTERNAL MEDICINE

## 2019-02-21 PROCEDURE — 1101F PT FALLS ASSESS-DOCD LE1/YR: CPT | Performed by: INTERNAL MEDICINE

## 2019-02-21 PROCEDURE — G8427 DOCREV CUR MEDS BY ELIG CLIN: HCPCS | Performed by: INTERNAL MEDICINE

## 2019-02-21 PROCEDURE — 4040F PNEUMOC VAC/ADMIN/RCVD: CPT | Performed by: INTERNAL MEDICINE

## 2019-02-21 ASSESSMENT — ENCOUNTER SYMPTOMS
CHEST TIGHTNESS: 0
SORE THROAT: 0
COUGH: 0
NAUSEA: 0
DIARRHEA: 0
RHINORRHEA: 0
BLOOD IN STOOL: 0
CONSTIPATION: 0
SHORTNESS OF BREATH: 0
ABDOMINAL PAIN: 0

## 2019-02-21 ASSESSMENT — PATIENT HEALTH QUESTIONNAIRE - PHQ9
SUM OF ALL RESPONSES TO PHQ9 QUESTIONS 1 & 2: 0
2. FEELING DOWN, DEPRESSED OR HOPELESS: 0
SUM OF ALL RESPONSES TO PHQ QUESTIONS 1-9: 0
1. LITTLE INTEREST OR PLEASURE IN DOING THINGS: 0
SUM OF ALL RESPONSES TO PHQ QUESTIONS 1-9: 0

## 2019-03-05 DIAGNOSIS — I10 BENIGN ESSENTIAL HTN: ICD-10-CM

## 2019-03-05 DIAGNOSIS — K21.00 GASTROESOPHAGEAL REFLUX DISEASE WITH ESOPHAGITIS: ICD-10-CM

## 2019-03-05 RX ORDER — LISINOPRIL 10 MG/1
TABLET ORAL
Qty: 30 TABLET | Refills: 5 | Status: SHIPPED | OUTPATIENT
Start: 2019-03-05 | End: 2019-09-01 | Stop reason: SDUPTHER

## 2019-03-05 RX ORDER — TRIAMTERENE AND HYDROCHLOROTHIAZIDE 37.5; 25 MG/1; MG/1
CAPSULE ORAL
Qty: 30 CAPSULE | Refills: 5 | Status: SHIPPED | OUTPATIENT
Start: 2019-03-05 | End: 2019-09-01 | Stop reason: SDUPTHER

## 2019-03-05 RX ORDER — LANSOPRAZOLE 30 MG/1
CAPSULE, DELAYED RELEASE ORAL
Qty: 30 CAPSULE | Refills: 5 | Status: SHIPPED | OUTPATIENT
Start: 2019-03-05 | End: 2019-09-01 | Stop reason: SDUPTHER

## 2019-03-20 ENCOUNTER — HOSPITAL ENCOUNTER (OUTPATIENT)
Age: 84
Discharge: HOME | End: 2019-03-20
Payer: MEDICARE

## 2019-03-20 ENCOUNTER — HOSPITAL ENCOUNTER (OUTPATIENT)
Age: 84
Discharge: HOME | End: 2019-03-22
Payer: MEDICARE

## 2019-03-20 ENCOUNTER — HOSPITAL ENCOUNTER (OUTPATIENT)
Dept: GENERAL RADIOLOGY | Age: 84
Discharge: HOME | End: 2019-03-22
Payer: MEDICARE

## 2019-03-20 DIAGNOSIS — E55.9 VITAMIN D DEFICIENCY: ICD-10-CM

## 2019-03-20 DIAGNOSIS — I25.10 CORONARY ARTERY DISEASE DUE TO LIPID RICH PLAQUE: ICD-10-CM

## 2019-03-20 DIAGNOSIS — E55.9 VITAMIN D DEFICIENCY DISEASE: ICD-10-CM

## 2019-03-20 DIAGNOSIS — I51.9 HEART DISEASE: ICD-10-CM

## 2019-03-20 DIAGNOSIS — E11.22 CONTROLLED TYPE 2 DIABETES MELLITUS WITH STAGE 2 CHRONIC KIDNEY DISEASE, WITHOUT LONG-TERM CURRENT USE OF INSULIN (HCC): ICD-10-CM

## 2019-03-20 DIAGNOSIS — N18.2 CONTROLLED TYPE 2 DIABETES MELLITUS WITH STAGE 2 CHRONIC KIDNEY DISEASE, WITHOUT LONG-TERM CURRENT USE OF INSULIN (HCC): ICD-10-CM

## 2019-03-20 DIAGNOSIS — I25.83 CORONARY ARTERY DISEASE DUE TO LIPID RICH PLAQUE: ICD-10-CM

## 2019-03-20 DIAGNOSIS — E78.2 MIXED HYPERLIPIDEMIA: ICD-10-CM

## 2019-03-20 LAB
ABSOLUTE EOS #: 0.1 K/UL (ref 0–0.4)
ABSOLUTE IMMATURE GRANULOCYTE: NORMAL K/UL (ref 0–0.3)
ABSOLUTE LYMPH #: 1.8 K/UL (ref 1–4.8)
ABSOLUTE MONO #: 0.3 K/UL (ref 0–1)
ALBUMIN SERPL-MCNC: 4.3 G/DL (ref 3.5–5.2)
ALBUMIN/GLOBULIN RATIO: ABNORMAL (ref 1–2.5)
ALP BLD-CCNC: 91 U/L (ref 35–104)
ALT SERPL-CCNC: 8 U/L (ref 5–33)
ANION GAP SERPL CALCULATED.3IONS-SCNC: 15 MMOL/L (ref 9–17)
AST SERPL-CCNC: 11 U/L
BASOPHILS # BLD: 0 % (ref 0–2)
BASOPHILS ABSOLUTE: 0 K/UL (ref 0–0.2)
BILIRUB SERPL-MCNC: 0.31 MG/DL (ref 0.3–1.2)
BUN BLDV-MCNC: 28 MG/DL (ref 8–23)
BUN/CREAT BLD: 16 (ref 9–20)
CALCIUM SERPL-MCNC: 9.7 MG/DL (ref 8.6–10.4)
CHLORIDE BLD-SCNC: 102 MMOL/L (ref 98–107)
CHOLESTEROL/HDL RATIO: 2.9
CHOLESTEROL: 185 MG/DL
CO2: 22 MMOL/L (ref 20–31)
CREAT SERPL-MCNC: 1.71 MG/DL (ref 0.5–0.9)
DIFFERENTIAL TYPE: YES
EOSINOPHILS RELATIVE PERCENT: 2 % (ref 0–5)
ESTIMATED AVERAGE GLUCOSE: 111 MG/DL
GFR AFRICAN AMERICAN: 34 ML/MIN
GFR NON-AFRICAN AMERICAN: 28 ML/MIN
GFR SERPL CREATININE-BSD FRML MDRD: ABNORMAL ML/MIN/{1.73_M2}
GFR SERPL CREATININE-BSD FRML MDRD: ABNORMAL ML/MIN/{1.73_M2}
GLUCOSE BLD-MCNC: 103 MG/DL (ref 70–99)
HBA1C MFR BLD: 5.5 % (ref 4.8–5.9)
HCT VFR BLD CALC: 36.9 % (ref 36–46)
HDLC SERPL-MCNC: 63 MG/DL
HEMOGLOBIN: 12.3 G/DL (ref 12–16)
IMMATURE GRANULOCYTES: NORMAL %
LDL CHOLESTEROL: 80 MG/DL (ref 0–130)
LYMPHOCYTES # BLD: 30 % (ref 15–40)
MAGNESIUM: 1.9 MG/DL (ref 1.6–2.6)
MCH RBC QN AUTO: 29.3 PG (ref 26–34)
MCHC RBC AUTO-ENTMCNC: 33.3 G/DL (ref 31–37)
MCV RBC AUTO: 88.2 FL (ref 80–100)
MONOCYTES # BLD: 5 % (ref 4–8)
NRBC AUTOMATED: NORMAL PER 100 WBC
PATIENT FASTING?: YES
PDW BLD-RTO: 13.9 % (ref 12.1–15.2)
PLATELET # BLD: 222 K/UL (ref 140–450)
PLATELET ESTIMATE: NORMAL
PMV BLD AUTO: NORMAL FL (ref 6–12)
POTASSIUM SERPL-SCNC: 4.1 MMOL/L (ref 3.7–5.3)
RBC # BLD: 4.19 M/UL (ref 4–5.2)
RBC # BLD: NORMAL 10*6/UL
SEG NEUTROPHILS: 63 % (ref 47–75)
SEGMENTED NEUTROPHILS ABSOLUTE COUNT: 3.8 K/UL (ref 2.5–7)
SODIUM BLD-SCNC: 139 MMOL/L (ref 135–144)
TOTAL PROTEIN: 7.3 G/DL (ref 6.4–8.3)
TRIGL SERPL-MCNC: 211 MG/DL
TSH SERPL DL<=0.05 MIU/L-ACNC: 1.69 MIU/L (ref 0.3–5)
VITAMIN D 25-HYDROXY: 42.4 NG/ML (ref 30–100)
VLDLC SERPL CALC-MCNC: ABNORMAL MG/DL (ref 1–30)
WBC # BLD: 6.1 K/UL (ref 3.5–11)
WBC # BLD: NORMAL 10*3/UL

## 2019-03-20 PROCEDURE — 80061 LIPID PANEL: CPT

## 2019-03-20 PROCEDURE — 83036 HEMOGLOBIN GLYCOSYLATED A1C: CPT

## 2019-03-20 PROCEDURE — 82306 VITAMIN D 25 HYDROXY: CPT

## 2019-03-20 PROCEDURE — 80053 COMPREHEN METABOLIC PANEL: CPT

## 2019-03-20 PROCEDURE — 85025 COMPLETE CBC W/AUTO DIFF WBC: CPT

## 2019-03-20 PROCEDURE — 36415 COLL VENOUS BLD VENIPUNCTURE: CPT

## 2019-03-20 PROCEDURE — 83735 ASSAY OF MAGNESIUM: CPT

## 2019-03-20 PROCEDURE — 84443 ASSAY THYROID STIM HORMONE: CPT

## 2019-03-20 PROCEDURE — 93005 ELECTROCARDIOGRAM TRACING: CPT

## 2019-03-20 PROCEDURE — 71046 X-RAY EXAM CHEST 2 VIEWS: CPT

## 2019-03-22 ENCOUNTER — OFFICE VISIT (OUTPATIENT)
Dept: CARDIOLOGY CLINIC | Age: 84
End: 2019-03-22
Payer: MEDICARE

## 2019-03-22 VITALS
SYSTOLIC BLOOD PRESSURE: 130 MMHG | DIASTOLIC BLOOD PRESSURE: 70 MMHG | BODY MASS INDEX: 27.12 KG/M2 | OXYGEN SATURATION: 100 % | HEART RATE: 88 BPM | WEIGHT: 163 LBS

## 2019-03-22 DIAGNOSIS — E78.2 MIXED HYPERLIPIDEMIA: ICD-10-CM

## 2019-03-22 DIAGNOSIS — N18.2 CONTROLLED TYPE 2 DIABETES MELLITUS WITH STAGE 2 CHRONIC KIDNEY DISEASE, WITHOUT LONG-TERM CURRENT USE OF INSULIN (HCC): ICD-10-CM

## 2019-03-22 DIAGNOSIS — E11.22 CONTROLLED TYPE 2 DIABETES MELLITUS WITH STAGE 2 CHRONIC KIDNEY DISEASE, WITHOUT LONG-TERM CURRENT USE OF INSULIN (HCC): ICD-10-CM

## 2019-03-22 DIAGNOSIS — I25.83 CORONARY ARTERY DISEASE DUE TO LIPID RICH PLAQUE: Primary | ICD-10-CM

## 2019-03-22 DIAGNOSIS — E55.9 VITAMIN D DEFICIENCY: ICD-10-CM

## 2019-03-22 DIAGNOSIS — I25.10 CORONARY ARTERY DISEASE DUE TO LIPID RICH PLAQUE: Primary | ICD-10-CM

## 2019-03-22 DIAGNOSIS — I10 BENIGN ESSENTIAL HTN: ICD-10-CM

## 2019-03-22 PROCEDURE — G8419 CALC BMI OUT NRM PARAM NOF/U: HCPCS | Performed by: INTERNAL MEDICINE

## 2019-03-22 PROCEDURE — 1090F PRES/ABSN URINE INCON ASSESS: CPT | Performed by: INTERNAL MEDICINE

## 2019-03-22 PROCEDURE — 4040F PNEUMOC VAC/ADMIN/RCVD: CPT | Performed by: INTERNAL MEDICINE

## 2019-03-22 PROCEDURE — 1036F TOBACCO NON-USER: CPT | Performed by: INTERNAL MEDICINE

## 2019-03-22 PROCEDURE — G8428 CUR MEDS NOT DOCUMENT: HCPCS | Performed by: INTERNAL MEDICINE

## 2019-03-22 PROCEDURE — G8482 FLU IMMUNIZE ORDER/ADMIN: HCPCS | Performed by: INTERNAL MEDICINE

## 2019-03-22 PROCEDURE — G8598 ASA/ANTIPLAT THER USED: HCPCS | Performed by: INTERNAL MEDICINE

## 2019-03-22 PROCEDURE — 1123F ACP DISCUSS/DSCN MKR DOCD: CPT | Performed by: INTERNAL MEDICINE

## 2019-03-22 PROCEDURE — 99214 OFFICE O/P EST MOD 30 MIN: CPT | Performed by: INTERNAL MEDICINE

## 2019-03-25 LAB
EKG ATRIAL RATE: 79 BPM
EKG P AXIS: 34 DEGREES
EKG P-R INTERVAL: 166 MS
EKG Q-T INTERVAL: 318 MS
EKG QRS DURATION: 62 MS
EKG QTC CALCULATION (BAZETT): 364 MS
EKG R AXIS: 36 DEGREES
EKG T AXIS: 55 DEGREES
EKG VENTRICULAR RATE: 79 BPM

## 2019-04-16 DIAGNOSIS — F32.A DEPRESSION: ICD-10-CM

## 2019-04-16 RX ORDER — PAROXETINE HYDROCHLORIDE 20 MG/1
TABLET, FILM COATED ORAL
Qty: 30 TABLET | Refills: 3 | Status: SHIPPED | OUTPATIENT
Start: 2019-04-16 | End: 2019-09-01 | Stop reason: SDUPTHER

## 2019-04-16 NOTE — TELEPHONE ENCOUNTER
2 chronic kidney disease, without long-term current use of insulin ([address]     Major depressive disorder with single episode, in full remission ([address]

## 2019-05-10 DIAGNOSIS — N18.2 CONTROLLED TYPE 2 DIABETES MELLITUS WITH STAGE 2 CHRONIC KIDNEY DISEASE, WITHOUT LONG-TERM CURRENT USE OF INSULIN (HCC): ICD-10-CM

## 2019-05-10 DIAGNOSIS — E11.22 CONTROLLED TYPE 2 DIABETES MELLITUS WITH STAGE 2 CHRONIC KIDNEY DISEASE, WITHOUT LONG-TERM CURRENT USE OF INSULIN (HCC): ICD-10-CM

## 2019-05-10 NOTE — TELEPHONE ENCOUNTER
Health Maintenance   Topic Date Due    DTaP/Tdap/Td vaccine (1 - Tdap) 10/20/1950    Shingles Vaccine (1 of 2) 10/20/1981    Potassium monitoring  03/20/2020    Creatinine monitoring  03/20/2020    Flu vaccine  Completed    Pneumococcal 65+ years Vaccine  Completed             (applicable per patient's age: Cancer Screenings, Depression Screening, Fall Risk Screening, Immunizations)    Hemoglobin A1C (%)   Date Value   03/20/2019 5.5   03/19/2018 5.7   01/16/2017 5.5     Microalb/Crt.  Ratio (mcg/mg creat)   Date Value   08/07/2013 132     LDL Cholesterol (mg/dL)   Date Value   03/20/2019 80     AST (U/L)   Date Value   03/20/2019 11     ALT (U/L)   Date Value   03/20/2019 8     BUN (mg/dL)   Date Value   03/20/2019 28 (H)      (goal A1C is < 7)   (goal LDL is <100) need 30-50% reduction from baseline     BP Readings from Last 3 Encounters:   03/22/19 130/70   02/21/19 132/81   08/10/18 115/61    (goal /80)      All Future Testing planned in [facility]:  Lab Frequency Next Occurrence   CBC Auto Differential Once 03/22/2020   Comprehensive Metabolic Panel Once 57/90/9948   Vitamin D 25 Hydroxy Once 03/22/2020   Lipid Panel Once 03/22/2020   TSH with Reflex Once 03/22/2020   Magnesium Once 03/22/2020   EKG 12 Lead Once 03/22/2020   XR CHEST STANDARD (2 VW) Once 03/22/2020   Hemoglobin A1C Once 03/22/2020       Next Visit Date:  Future Appointments   Date Time Provider Pradeep Coello   8/22/2019  1:15 PM [facility]   3/12/2020 10:30 AM Loreda Seltzer, MD Frosty Klinefelter [facility]            Patient Active Problem List:     Vitamin D deficiency     Osteoporosis, senile     Mixed hyperlipidemia     History of colon cancer     GERD (gastroesophageal reflux disease)     CAD (coronary artery disease)     Benign essential HTN     Hypercholesterolemia     Cancer ([facility]     Osteoarthrosis     Colon cancer ([facility]     CRI (chronic renal insufficiency)     Controlled type 2 diabetes mellitus with stage 2 chronic kidney disease, without long-term current use of insulin ([address]     Major depressive disorder with single episode, in full remission ([address]

## 2019-06-18 DIAGNOSIS — N18.2 CONTROLLED TYPE 2 DIABETES MELLITUS WITH STAGE 2 CHRONIC KIDNEY DISEASE, WITHOUT LONG-TERM CURRENT USE OF INSULIN (HCC): ICD-10-CM

## 2019-06-18 DIAGNOSIS — E11.22 CONTROLLED TYPE 2 DIABETES MELLITUS WITH STAGE 2 CHRONIC KIDNEY DISEASE, WITHOUT LONG-TERM CURRENT USE OF INSULIN (HCC): ICD-10-CM

## 2019-06-18 NOTE — TELEPHONE ENCOUNTER
Health Maintenance   Topic Date Due    DTaP/Tdap/Td vaccine (1 - Tdap) 10/20/1950    Shingles Vaccine (1 of 2) 10/20/1981    Potassium monitoring  03/20/2020    Creatinine monitoring  03/20/2020    Flu vaccine  Completed    Pneumococcal 65+ years Vaccine  Completed             (applicable per patient's age: Cancer Screenings, Depression Screening, Fall Risk Screening, Immunizations)    Hemoglobin A1C (%)   Date Value   03/20/2019 5.5   03/19/2018 5.7   01/16/2017 5.5     Microalb/Crt.  Ratio (mcg/mg creat)   Date Value   08/07/2013 132     LDL Cholesterol (mg/dL)   Date Value   03/20/2019 80     AST (U/L)   Date Value   03/20/2019 11     ALT (U/L)   Date Value   03/20/2019 8     BUN (mg/dL)   Date Value   03/20/2019 28 (H)      (goal A1C is < 7)   (goal LDL is <100) need 30-50% reduction from baseline     BP Readings from Last 3 Encounters:   03/22/19 130/70   02/21/19 132/81   08/10/18 115/61    (goal /80)      All Future Testing planned in [facility]:  Lab Frequency Next Occurrence   CBC Auto Differential Once 03/22/2020   Comprehensive Metabolic Panel Once 90/24/9319   Vitamin D 25 Hydroxy Once 03/22/2020   Lipid Panel Once 03/22/2020   TSH with Reflex Once 03/22/2020   Magnesium Once 03/22/2020   EKG 12 Lead Once 03/22/2020   XR CHEST STANDARD (2 VW) Once 03/22/2020   Hemoglobin A1C Once 03/22/2020       Next Visit Date:  Future Appointments   Date Time Provider Pradeep Coello   8/22/2019  1:15 PM MD Inocencia West [facility]   3/12/2020 10:30 [facility]            Patient Active Problem List:     Vitamin D deficiency     Osteoporosis, senile     Mixed hyperlipidemia     History of colon cancer     GERD (gastroesophageal reflux disease)     CAD (coronary artery disease)     Benign essential HTN     Hypercholesterolemia     Cancer ([address]     Osteoarthrosis     Colon cancer ([facility]     CRI (chronic renal insufficiency)     Controlled type 2 diabetes mellitus with stage 2 chronic kidney disease, without long-term current use of insulin ([address]     Major depressive disorder with single episode, in full remission ([address]

## 2019-07-08 ENCOUNTER — TELEPHONE (OUTPATIENT)
Dept: FAMILY MEDICINE CLINIC | Age: 84
End: 2019-07-08

## 2019-07-08 DIAGNOSIS — E11.22 CONTROLLED TYPE 2 DIABETES MELLITUS WITH STAGE 2 CHRONIC KIDNEY DISEASE, WITHOUT LONG-TERM CURRENT USE OF INSULIN (HCC): ICD-10-CM

## 2019-07-08 DIAGNOSIS — N18.2 CONTROLLED TYPE 2 DIABETES MELLITUS WITH STAGE 2 CHRONIC KIDNEY DISEASE, WITHOUT LONG-TERM CURRENT USE OF INSULIN (HCC): ICD-10-CM

## 2019-07-08 NOTE — TELEPHONE ENCOUNTER
Health Maintenance   Topic Date Due    DTaP/Tdap/Td vaccine (1 - Tdap) 10/20/1950    Shingles Vaccine (1 of 2) 10/20/1981    Annual Wellness Visit (AWV)  10/20/1994    Flu vaccine (1) 09/01/2019    Potassium monitoring  03/20/2020    Creatinine monitoring  03/20/2020    Pneumococcal 65+ years Vaccine  Completed             (applicable per patient's age: Cancer Screenings, Depression Screening, Fall Risk Screening, Immunizations)    Hemoglobin A1C (%)   Date Value   03/20/2019 5.5   03/19/2018 5.7   01/16/2017 5.5     Microalb/Crt.  Ratio (mcg/mg creat)   Date Value   08/07/2013 132     LDL Cholesterol (mg/dL)   Date Value   03/20/2019 80     AST (U/L)   Date Value   03/20/2019 11     ALT (U/L)   Date Value   03/20/2019 8     BUN (mg/dL)   Date Value   03/20/2019 28 (H)      (goal A1C is < 7)   (goal LDL is <100) need 30-50% reduction from baseline     BP Readings from Last 3 Encounters:   03/22/19 130/70   02/21/19 132/81   08/10/18 115/61    (goal /80)      All Future Testing planned in [facility]:  Lab Frequency Next Occurrence   CBC Auto Differential Once 03/22/2020   Comprehensive Metabolic Panel Once 81/14/6763   Vitamin D 25 Hydroxy Once 03/22/2020   Lipid Panel Once 03/22/2020   TSH with Reflex Once 03/22/2020   Magnesium Once 03/22/2020   EKG 12 Lead Once 03/22/2020   XR CHEST STANDARD (2 VW) Once 03/22/2020   Hemoglobin A1C Once 03/22/2020       Next Visit Date:  Future Appointments   Date Time Provider Pradeep Coello   8/22/2019  1:15 PM [facility]   3/12/2020 10:30 [facility]            Patient Active Problem List:     Vitamin D deficiency     Osteoporosis, senile     Mixed hyperlipidemia     History of colon cancer     GERD (gastroesophageal reflux disease)     CAD (coronary artery disease)     Benign essential HTN     Hypercholesterolemia     Cancer ([address]     Osteoarthrosis     Colon cancer ([address]     CRI (chronic renal insufficiency)

## 2019-07-16 ENCOUNTER — TELEPHONE (OUTPATIENT)
Dept: FAMILY MEDICINE CLINIC | Age: 84
End: 2019-07-16

## 2019-08-22 ENCOUNTER — OFFICE VISIT (OUTPATIENT)
Dept: FAMILY MEDICINE CLINIC | Age: 84
End: 2019-08-22
Payer: MEDICARE

## 2019-08-22 VITALS
WEIGHT: 161 LBS | HEIGHT: 62 IN | BODY MASS INDEX: 29.63 KG/M2 | SYSTOLIC BLOOD PRESSURE: 136 MMHG | DIASTOLIC BLOOD PRESSURE: 80 MMHG | HEART RATE: 87 BPM

## 2019-08-22 DIAGNOSIS — I25.10 CORONARY ARTERY DISEASE DUE TO LIPID RICH PLAQUE: ICD-10-CM

## 2019-08-22 DIAGNOSIS — I25.83 CORONARY ARTERY DISEASE DUE TO LIPID RICH PLAQUE: ICD-10-CM

## 2019-08-22 DIAGNOSIS — Z23 NEED FOR INFLUENZA VACCINATION: ICD-10-CM

## 2019-08-22 DIAGNOSIS — E78.00 HYPERCHOLESTEROLEMIA: ICD-10-CM

## 2019-08-22 DIAGNOSIS — I10 BENIGN ESSENTIAL HTN: Primary | ICD-10-CM

## 2019-08-22 DIAGNOSIS — E55.9 VITAMIN D DEFICIENCY: ICD-10-CM

## 2019-08-22 DIAGNOSIS — K21.9 GASTROESOPHAGEAL REFLUX DISEASE WITHOUT ESOPHAGITIS: ICD-10-CM

## 2019-08-22 PROCEDURE — 4040F PNEUMOC VAC/ADMIN/RCVD: CPT | Performed by: INTERNAL MEDICINE

## 2019-08-22 PROCEDURE — 1090F PRES/ABSN URINE INCON ASSESS: CPT | Performed by: INTERNAL MEDICINE

## 2019-08-22 PROCEDURE — 1036F TOBACCO NON-USER: CPT | Performed by: INTERNAL MEDICINE

## 2019-08-22 PROCEDURE — G8427 DOCREV CUR MEDS BY ELIG CLIN: HCPCS | Performed by: INTERNAL MEDICINE

## 2019-08-22 PROCEDURE — G8598 ASA/ANTIPLAT THER USED: HCPCS | Performed by: INTERNAL MEDICINE

## 2019-08-22 PROCEDURE — G0008 ADMIN INFLUENZA VIRUS VAC: HCPCS | Performed by: INTERNAL MEDICINE

## 2019-08-22 PROCEDURE — G8419 CALC BMI OUT NRM PARAM NOF/U: HCPCS | Performed by: INTERNAL MEDICINE

## 2019-08-22 PROCEDURE — 90686 IIV4 VACC NO PRSV 0.5 ML IM: CPT | Performed by: INTERNAL MEDICINE

## 2019-08-22 PROCEDURE — 1123F ACP DISCUSS/DSCN MKR DOCD: CPT | Performed by: INTERNAL MEDICINE

## 2019-08-22 PROCEDURE — 99214 OFFICE O/P EST MOD 30 MIN: CPT | Performed by: INTERNAL MEDICINE

## 2019-08-22 ASSESSMENT — ENCOUNTER SYMPTOMS
NAUSEA: 0
SORE THROAT: 0
CHEST TIGHTNESS: 0
ABDOMINAL PAIN: 0
BLOOD IN STOOL: 0
SHORTNESS OF BREATH: 0
CONSTIPATION: 0
RHINORRHEA: 0
COUGH: 0
DIARRHEA: 0

## 2019-08-22 NOTE — PROGRESS NOTES
After obtaining consent, and per orders of Dr. Marylee Durham, injection of Afluria given in Left deltoid by Agustina Renee. Patient instructed to remain in clinic for 20 minutes afterwards, and to report any adverse reaction to me immediately.

## 2019-09-01 DIAGNOSIS — I10 BENIGN ESSENTIAL HTN: ICD-10-CM

## 2019-09-01 DIAGNOSIS — K21.00 GASTROESOPHAGEAL REFLUX DISEASE WITH ESOPHAGITIS: ICD-10-CM

## 2019-09-01 DIAGNOSIS — F32.A DEPRESSION: ICD-10-CM

## 2019-09-03 RX ORDER — TRIAMTERENE AND HYDROCHLOROTHIAZIDE 37.5; 25 MG/1; MG/1
CAPSULE ORAL
Qty: 30 CAPSULE | Refills: 5 | Status: SHIPPED | OUTPATIENT
Start: 2019-09-03 | End: 2020-02-20

## 2019-09-03 RX ORDER — PAROXETINE HYDROCHLORIDE 20 MG/1
TABLET, FILM COATED ORAL
Qty: 30 TABLET | Refills: 3 | Status: SHIPPED | OUTPATIENT
Start: 2019-09-03 | End: 2020-01-08

## 2019-09-03 RX ORDER — LANSOPRAZOLE 30 MG/1
CAPSULE, DELAYED RELEASE ORAL
Qty: 30 CAPSULE | Refills: 5 | Status: SHIPPED | OUTPATIENT
Start: 2019-09-03 | End: 2020-02-20

## 2019-09-03 RX ORDER — LISINOPRIL 10 MG/1
TABLET ORAL
Qty: 30 TABLET | Refills: 5 | Status: SHIPPED | OUTPATIENT
Start: 2019-09-03 | End: 2020-02-20

## 2019-10-16 DIAGNOSIS — E78.2 MIXED HYPERLIPIDEMIA: ICD-10-CM

## 2019-10-16 RX ORDER — LOVASTATIN 40 MG/1
TABLET ORAL
Qty: 60 TABLET | Refills: 5 | Status: SHIPPED | OUTPATIENT
Start: 2019-10-16 | End: 2019-11-15

## 2019-11-01 ENCOUNTER — OFFICE VISIT (OUTPATIENT)
Dept: FAMILY MEDICINE CLINIC | Age: 84
End: 2019-11-01
Payer: MEDICARE

## 2019-11-01 VITALS
HEART RATE: 89 BPM | SYSTOLIC BLOOD PRESSURE: 139 MMHG | WEIGHT: 162 LBS | BODY MASS INDEX: 27.66 KG/M2 | DIASTOLIC BLOOD PRESSURE: 89 MMHG | HEIGHT: 64 IN

## 2019-11-01 DIAGNOSIS — Z00.00 ROUTINE GENERAL MEDICAL EXAMINATION AT A HEALTH CARE FACILITY: ICD-10-CM

## 2019-11-01 DIAGNOSIS — Z23 NEED FOR INFLUENZA VACCINATION: Primary | ICD-10-CM

## 2019-11-01 PROCEDURE — G8598 ASA/ANTIPLAT THER USED: HCPCS | Performed by: INTERNAL MEDICINE

## 2019-11-01 PROCEDURE — 1123F ACP DISCUSS/DSCN MKR DOCD: CPT | Performed by: INTERNAL MEDICINE

## 2019-11-01 PROCEDURE — 4040F PNEUMOC VAC/ADMIN/RCVD: CPT | Performed by: INTERNAL MEDICINE

## 2019-11-01 PROCEDURE — G0438 PPPS, INITIAL VISIT: HCPCS | Performed by: INTERNAL MEDICINE

## 2019-11-01 PROCEDURE — G8482 FLU IMMUNIZE ORDER/ADMIN: HCPCS | Performed by: INTERNAL MEDICINE

## 2019-11-01 ASSESSMENT — PATIENT HEALTH QUESTIONNAIRE - PHQ9
SUM OF ALL RESPONSES TO PHQ QUESTIONS 1-9: 0
SUM OF ALL RESPONSES TO PHQ QUESTIONS 1-9: 0

## 2019-11-01 ASSESSMENT — LIFESTYLE VARIABLES: HOW OFTEN DO YOU HAVE A DRINK CONTAINING ALCOHOL: 0

## 2019-11-14 DIAGNOSIS — E78.2 MIXED HYPERLIPIDEMIA: ICD-10-CM

## 2019-11-15 RX ORDER — LOVASTATIN 40 MG/1
TABLET ORAL
Qty: 60 TABLET | Refills: 5 | Status: SHIPPED | OUTPATIENT
Start: 2019-11-15 | End: 2020-09-28

## 2020-01-08 RX ORDER — PAROXETINE HYDROCHLORIDE 20 MG/1
TABLET, FILM COATED ORAL
Qty: 30 TABLET | Refills: 5 | Status: SHIPPED | OUTPATIENT
Start: 2020-01-08 | End: 2020-06-09

## 2020-01-08 NOTE — TELEPHONE ENCOUNTER
Health Maintenance   Topic Date Due    DTaP/Tdap/Td vaccine (1 - Tdap) 10/20/1942    Shingles Vaccine (1 of 2) 10/20/1981    Lipid screen  03/20/2020    Potassium monitoring  03/20/2020    Creatinine monitoring  03/20/2020    Annual Wellness Visit (AWV)  10/31/2020    Flu vaccine  Completed    Pneumococcal 65+ years Vaccine  Completed             (applicable per patient's age: Cancer Screenings, Depression Screening, Fall Risk Screening, Immunizations)    Hemoglobin A1C (%)   Date Value   03/20/2019 5.5   03/19/2018 5.7   01/16/2017 5.5     Microalb/Crt.  Ratio (mcg/mg creat)   Date Value   08/07/2013 132     LDL Cholesterol (mg/dL)   Date Value   03/20/2019 80     AST (U/L)   Date Value   03/20/2019 11     ALT (U/L)   Date Value   03/20/2019 8     BUN (mg/dL)   Date Value   03/20/2019 28 (H)      (goal A1C is < 7)   (goal LDL is <100) need 30-50% reduction from baseline     BP Readings from Last 3 Encounters:   11/01/19 139/89   08/22/19 136/80   03/22/19 130/70    (goal /80)      All Future Testing planned in [facility]:  Lab Frequency Next Occurrence   CBC Auto Differential Once 03/22/2020   Comprehensive Metabolic Panel Once 74/59/2288   Vitamin D 25 Hydroxy Once 03/22/2020   Lipid Panel Once 03/22/2020   TSH with Reflex Once 03/22/2020   Magnesium Once 03/22/2020   EKG 12 Lead Once 03/22/2020   XR CHEST STANDARD (2 VW) Once 03/22/2020   Hemoglobin A1C Once 03/22/2020       Next Visit Date:  Future Appointments   Date Time Provider Pradeep Coello   2/21/2020 11:00 AM MD [place] Copas  [address] [facility]   3/12/2020 10:30 AM Mardel Denver, MD Jessie Genta [facility]   11/16/2020  2:30 PM Kellee Crouch MD [facility] [address] [facility]            Patient Active Problem List:     Vitamin D deficiency     Osteoporosis, senile     Mixed hyperlipidemia     History of colon cancer     GERD (gastroesophageal reflux disease)     CAD (coronary artery disease)     Benign essential HTN     Hypercholesterolemia     Cancer ([facility]     Osteoarthrosis     Colon cancer ([facility]     CRI (chronic renal insufficiency)     Controlled type 2 diabetes mellitus with stage 2 chronic kidney disease, without long-term current use of insulin ([facility]     Major depressive disorder with single episode, in full remission ([facility]

## 2020-02-03 NOTE — TELEPHONE ENCOUNTER
Health Maintenance   Topic Date Due    DTaP/Tdap/Td vaccine (1 - Tdap) 10/20/1942    Shingles Vaccine (1 of 2) 10/20/1981    Lipid screen  03/20/2020    Potassium monitoring  03/20/2020    Creatinine monitoring  03/20/2020    Annual Wellness Visit (AWV)  10/31/2020    Flu vaccine  Completed    Pneumococcal 65+ years Vaccine  Completed             (applicable per patient's age: Cancer Screenings, Depression Screening, Fall Risk Screening, Immunizations)    Hemoglobin A1C (%)   Date Value   03/20/2019 5.5   03/19/2018 5.7   01/16/2017 5.5     Microalb/Crt.  Ratio (mcg/mg creat)   Date Value   08/07/2013 132     LDL Cholesterol (mg/dL)   Date Value   03/20/2019 80     AST (U/L)   Date Value   03/20/2019 11     ALT (U/L)   Date Value   03/20/2019 8     BUN (mg/dL)   Date Value   03/20/2019 28 (H)      (goal A1C is < 7)   (goal LDL is <100) need 30-50% reduction from baseline     BP Readings from Last 3 Encounters:   11/01/19 139/89   08/22/19 136/80   03/22/19 130/70    (goal /80)      All Future Testing planned in [facility]:  Lab Frequency Next Occurrence   CBC Auto Differential Once 03/22/2020   Comprehensive Metabolic Panel Once 20/74/3239   Vitamin D 25 Hydroxy Once 03/22/2020   Lipid Panel Once 03/22/2020   TSH with Reflex Once 03/22/2020   Magnesium Once 03/22/2020   EKG 12 Lead Once 03/22/2020   XR CHEST STANDARD (2 VW) Once 03/22/2020   Hemoglobin A1C Once 03/22/2020       Next Visit Date:  Future Appointments   Date Time Provider Pradeep Coello   2/21/2020 11:00 AM Janine Mckinnon MD [facility] [address] [facility]   3/12/2020 10:30 AM [facility]   11/16/2020  2:30 PM Janine Mckinnon MD Reyna Click [address] [facility]            Patient Active Problem List:     Vitamin D deficiency     Osteoporosis, senile     Mixed hyperlipidemia     History of colon cancer     GERD (gastroesophageal reflux disease)     CAD (coronary artery disease)     Benign essential HTN     Hypercholesterolemia     Cancer ([facility]     Osteoarthrosis     Colon cancer ([facility]     CRI (chronic renal insufficiency)     Controlled type 2 diabetes mellitus with stage 2 chronic kidney disease, without long-term current use of insulin ([facility]     Major depressive disorder with single episode, in full remission ([facility]

## 2020-02-20 RX ORDER — TRIAMTERENE AND HYDROCHLOROTHIAZIDE 37.5; 25 MG/1; MG/1
CAPSULE ORAL
Qty: 30 CAPSULE | Refills: 5 | Status: SHIPPED | OUTPATIENT
Start: 2020-02-20 | End: 2020-10-22

## 2020-02-20 RX ORDER — LISINOPRIL 10 MG/1
TABLET ORAL
Qty: 30 TABLET | Refills: 5 | Status: SHIPPED | OUTPATIENT
Start: 2020-02-20 | End: 2020-08-13

## 2020-02-20 RX ORDER — LANSOPRAZOLE 30 MG/1
CAPSULE, DELAYED RELEASE ORAL
Qty: 30 CAPSULE | Refills: 5 | Status: SHIPPED | OUTPATIENT
Start: 2020-02-20 | End: 2020-10-22

## 2020-02-25 ENCOUNTER — OFFICE VISIT (OUTPATIENT)
Dept: FAMILY MEDICINE CLINIC | Age: 85
End: 2020-02-25
Payer: MEDICARE

## 2020-02-25 VITALS
HEIGHT: 64 IN | DIASTOLIC BLOOD PRESSURE: 79 MMHG | BODY MASS INDEX: 27.83 KG/M2 | WEIGHT: 163 LBS | SYSTOLIC BLOOD PRESSURE: 124 MMHG | HEART RATE: 90 BPM

## 2020-02-25 PROCEDURE — 99214 OFFICE O/P EST MOD 30 MIN: CPT | Performed by: INTERNAL MEDICINE

## 2020-02-25 PROCEDURE — G8417 CALC BMI ABV UP PARAM F/U: HCPCS | Performed by: INTERNAL MEDICINE

## 2020-02-25 PROCEDURE — G8482 FLU IMMUNIZE ORDER/ADMIN: HCPCS | Performed by: INTERNAL MEDICINE

## 2020-02-25 PROCEDURE — 1036F TOBACCO NON-USER: CPT | Performed by: INTERNAL MEDICINE

## 2020-02-25 PROCEDURE — 1090F PRES/ABSN URINE INCON ASSESS: CPT | Performed by: INTERNAL MEDICINE

## 2020-02-25 PROCEDURE — 4040F PNEUMOC VAC/ADMIN/RCVD: CPT | Performed by: INTERNAL MEDICINE

## 2020-02-25 PROCEDURE — 1123F ACP DISCUSS/DSCN MKR DOCD: CPT | Performed by: INTERNAL MEDICINE

## 2020-02-25 PROCEDURE — G8427 DOCREV CUR MEDS BY ELIG CLIN: HCPCS | Performed by: INTERNAL MEDICINE

## 2020-02-25 SDOH — ECONOMIC STABILITY: TRANSPORTATION INSECURITY
IN THE PAST 12 MONTHS, HAS THE LACK OF TRANSPORTATION KEPT YOU FROM MEDICAL APPOINTMENTS OR FROM GETTING MEDICATIONS?: NO

## 2020-02-25 SDOH — ECONOMIC STABILITY: FOOD INSECURITY: WITHIN THE PAST 12 MONTHS, YOU WORRIED THAT YOUR FOOD WOULD RUN OUT BEFORE YOU GOT MONEY TO BUY MORE.: NEVER TRUE

## 2020-02-25 SDOH — ECONOMIC STABILITY: INCOME INSECURITY: HOW HARD IS IT FOR YOU TO PAY FOR THE VERY BASICS LIKE FOOD, HOUSING, MEDICAL CARE, AND HEATING?: NOT VERY HARD

## 2020-02-25 SDOH — ECONOMIC STABILITY: FOOD INSECURITY: WITHIN THE PAST 12 MONTHS, THE FOOD YOU BOUGHT JUST DIDN'T LAST AND YOU DIDN'T HAVE MONEY TO GET MORE.: NEVER TRUE

## 2020-02-25 SDOH — ECONOMIC STABILITY: TRANSPORTATION INSECURITY
IN THE PAST 12 MONTHS, HAS LACK OF TRANSPORTATION KEPT YOU FROM MEETINGS, WORK, OR FROM GETTING THINGS NEEDED FOR DAILY LIVING?: NO

## 2020-02-25 ASSESSMENT — ENCOUNTER SYMPTOMS
ABDOMINAL PAIN: 0
SHORTNESS OF BREATH: 0
SORE THROAT: 0
CHEST TIGHTNESS: 0
CONSTIPATION: 0
NAUSEA: 0
RHINORRHEA: 0
BLOOD IN STOOL: 0
COUGH: 0
DIARRHEA: 0

## 2020-02-25 NOTE — PATIENT INSTRUCTIONS
SURVEY:    You may be receiving a survey from [employer] regarding your visit today. Please complete the survey to enable us to provide the highest quality of care to you and your family. If you cannot score us a very good on any question, please call the office to discuss how we could of made your experience a very good one. Thank you. 1. Benign essential HTN  Controlled on the current medication. 2. Coronary artery disease due to lipid rich plaque  No chest pain. Labs through Dr. Candace Hinton in March. 3. Controlled type 2 diabetes mellitus with stage 2 chronic kidney disease, without long-term current use of insulin ([address]  Controlled on Tradgenta. HgbA1C with Dr. Dilma Spencer labs. 4. Chronic renal impairment, stage 3 (moderate) (HCC)  Stable  Avoid NSAIDS. 5. Gastroesophageal reflux disease without esophagitis  Controlled on PPI. 6. Major depressive disorder with single episode, in full remission ([address]  Controlled on Paxil. 7. Vitamin D deficiency  Take 2000 units of Vitamin D daily (has not been taking). 8. Mixed hyperlipidemia  Controlled on statin. [place] was instructed to follow up in the clinic in 6 months for check up or as needed with any medical issues.

## 2020-02-25 NOTE — PROGRESS NOTES
Visit (AWV)  11/01/2020    Flu vaccine  Completed    Pneumococcal 65+ years Vaccine  Completed    Hepatitis A vaccine  Aged Out    Hib vaccine  Aged Out    Meningococcal (ACWY) vaccine  Aged Out

## 2020-02-25 NOTE — PROGRESS NOTES
Subjective:      Patient ID: Ernesto Brock is a 80 y.o. female. [place] presents for a check up on her medical conditions DM II, HTN, Hyperlipidemia, GERD. [place] denies new problems. Medications were reviewed with [place], she is  tolerating the medication. Bowels are regular. There has not been rectal bleeding. [place] denies urinary complications, the urine stream is good. [place] denies chest pain and denies increasing shortness of breath. Labs from 3/19 reviewed. Depression / anxiety is well controlled on Paxil. Past Medical History:  No date: Cancer [facility])      Comment:  colon  10/02: Colon cancer [facility]  No date: Diabetes mellitus ([facility]  No date: Esophageal reflux  No date: Heart disease  No date: Hypercholesterolemia  No date: Hypertension  No date: Microalbuminuria  No date: Osteoarthrosis  No date: Osteoporosis    Past Surgical History:  , 12: COLONOSCOPY  07/10/2017: COLONOSCOPY      Comment:  Dr. Gregory Noe:  3 adenomatous polyps.   08: DIAGNOSTIC CARDIAC CATH LAB PROCEDURE  10/02: HEMICOLECTOMY  No date: HYSTERECTOMY  1973: KNEE SURGERY  7/10/2017: PA COLON CA SCRN NOT HI RSK IND; N/A      Comment:  COLONOSCOPY performed by Lanie Mackey MD at [facility] OR    Social History    Socioeconomic History      Marital status:       Spouse name: Not on file      Number of children: Not on file      Years of education: Not on file      Highest education level: Not on file    Occupational History        Employer: UNEMPLOYED    Social Needs      Financial resource strain: Not very hard      Food insecurity:        Worry: Never true        Inability: Never true      Transportation needs:        Medical: No        Non-medical: No    Tobacco Use      Smoking status: Former Smoker        Quit date: 10/11/1977        Years since quittin.4      Smokeless tobacco: Never Used    Substance and Sexual Activity      Alcohol use: No      Drug use: Not on file      Sexual activity: Not on file    Lifestyle      Physical activity:        Days per week: Not on file        Minutes per session: Not on file      Stress: Not on file    Relationships      Social connections:        Talks on phone: Not on file        Gets together: Not on file        Attends Sikhism service: Not on file        Active member of club or organization: Not on file        Attends meetings of clubs or organizations: Not on file        Relationship status: Not on file      Intimate partner violence:        Fear of current or ex partner: Not on file        Emotionally abused: Not on file        Physically abused: Not on file        Forced sexual activity: Not on file    Other Topics      Concerns:        Not on file    Social History Narrative      Not on file      Current Outpatient Medications on File Prior to Visit:  lansoprazole (PREVACID) 30 MG delayed release capsule, TAKE 1 CAPSULE BY MOUTH EVERY DAY, Disp: 30 capsule, Rfl: 5  lisinopril (PRINIVIL;ZESTRIL) 10 MG tablet, TAKE 1 TABLET BY MOUTH EVERY DAY, Disp: 30 tablet, Rfl: 5  triamterene-hydroCHLOROthiazide (DYAZIDE) 37.5-25 MG per capsule, TAKE 1 CAPSULE BY MOUTH EVERY DAY, Disp: 30 capsule, Rfl: 5  PARoxetine (PAXIL) 20 MG tablet, TAKE 1 TABLET BY MOUTH EVERY DAY IN THE MORNING, Disp: 30 tablet, Rfl: 5  lovastatin (MEVACOR) 40 MG tablet, TAKE 1 TABLET BY MOUTH NIGHTLY, Disp: 60 tablet, Rfl: 5  linagliptin (TRADJENTA) 5 MG tablet, Take 1 tablet by mouth daily, Disp: 30 tablet, Rfl: 3  aspirin 81 MG tablet, Take 81 mg by mouth daily. , Disp: , Rfl:   Cholecalciferol (VITAMIN D) 2000 UNITS CAPS capsule, Take 1 capsule by mouth daily. , Disp: 30 capsule, Rfl: 11  FIBER PO, Take 1 tablet by mouth daily, Disp: , Rfl:     No current facility-administered medications on file prior to visit.          Lab Results       Component                Value               Date                       NA                       139                 03/20/2019                 K 4.1                 03/20/2019                 CL                       102                 03/20/2019                 CO2                      22                  03/20/2019                 BUN                      28 (H)              03/20/2019                 CREATININE               1.71 (H)            03/20/2019                 GLUCOSE                  103 (H)             03/20/2019                 CALCIUM                  9.7                 03/20/2019                 PROT                     7.3                 03/20/2019                 LABALBU                  4.3                 03/20/2019                 BILITOT                  0.31                03/20/2019                 ALKPHOS                  91                  03/20/2019                 AST                      11                  03/20/2019                 ALT                      8                   03/20/2019                 LABGLOM                  28 (L)              03/20/2019                 GFRAA                    34 (L)              03/20/2019              Lab Results       Component                Value               Date                       LABA1C                   5.5                 03/20/2019            Lab Results       Component                Value               Date                       EAG                      111                 03/20/2019              Lab Results       Component                Value               Date                       CHOL                     185                 03/20/2019                 CHOL                     190                 03/19/2018                 CHOL                     187                 03/20/2017            Lab Results       Component                Value               Date                       TRIG                     211 (H)             03/20/2019                 TRIG                     160 (H)             03/19/2018                 TRIG                     180 (H) 03/20/2017            Lab Results       Component                Value               Date                       HDL                      63                  03/20/2019                 HDL                      63                  03/19/2018                 HDL                      68                  03/20/2017            Lab Results       Component                Value               Date                       LDLCHOLESTEROL           80                  03/20/2019                 LDLCHOLESTEROL           95                  03/19/2018                 LDLCHOLESTEROL           83                  03/20/2017            Lab Results       Component                Value               Date                       VLDL                                         03/20/2019             NOT REPORTED (H)       VLDL                     NOT REPORTED        03/19/2018                 VLDL                     36 (H)              03/20/2017            Lab Results       Component                Value               Date                       CHOLHDLRATIO             2.9                 03/20/2019                 CHOLHDLRATIO             3.0                 03/19/2018                 CHOLHDLRATIO             2.8                 03/20/2017                            Diabetes   She presents for her follow-up diabetic visit. She has type 2 diabetes mellitus. Her disease course has been stable. There are no hypoglycemic associated symptoms. Pertinent negatives for diabetes include no chest pain. There are no hypoglycemic complications. Symptoms are stable. Current diabetic treatment includes oral agent (monotherapy). She is compliant with treatment all of the time. Her weight is stable. She is following a diabetic diet. She never participates in exercise. There is no change in her home blood glucose trend. An ACE inhibitor/angiotensin II receptor blocker is being taken. Eye exam is not current. Hypertension   This is a chronic problem.  The

## 2020-03-09 ENCOUNTER — HOSPITAL ENCOUNTER (OUTPATIENT)
Age: 85
Discharge: HOME | End: 2020-03-09
Payer: MEDICARE

## 2020-03-09 ENCOUNTER — HOSPITAL ENCOUNTER (OUTPATIENT)
Dept: GENERAL RADIOLOGY | Age: 85
Discharge: HOME | End: 2020-03-11
Payer: MEDICARE

## 2020-03-09 ENCOUNTER — HOSPITAL ENCOUNTER (OUTPATIENT)
Age: 85
Discharge: HOME | End: 2020-03-11
Payer: MEDICARE

## 2020-03-09 LAB
ABSOLUTE EOS #: 0.1 K/UL (ref 0–0.4)
ABSOLUTE IMMATURE GRANULOCYTE: NORMAL K/UL (ref 0–0.3)
ABSOLUTE LYMPH #: 2 K/UL (ref 1–4.8)
ABSOLUTE MONO #: 0.3 K/UL (ref 0–1)
ALBUMIN SERPL-MCNC: 4.3 G/DL (ref 3.5–5.2)
ALBUMIN/GLOBULIN RATIO: ABNORMAL (ref 1–2.5)
ALP BLD-CCNC: 112 U/L (ref 35–104)
ALT SERPL-CCNC: 8 U/L (ref 5–33)
ANION GAP SERPL CALCULATED.3IONS-SCNC: 13 MMOL/L (ref 9–17)
AST SERPL-CCNC: 13 U/L
BASOPHILS # BLD: 1 % (ref 0–2)
BASOPHILS ABSOLUTE: 0 K/UL (ref 0–0.2)
BILIRUB SERPL-MCNC: 0.33 MG/DL (ref 0.3–1.2)
BUN BLDV-MCNC: 26 MG/DL (ref 8–23)
BUN/CREAT BLD: 14 (ref 9–20)
CALCIUM SERPL-MCNC: 10.5 MG/DL (ref 8.6–10.4)
CHLORIDE BLD-SCNC: 102 MMOL/L (ref 98–107)
CHOLESTEROL/HDL RATIO: 2.9
CHOLESTEROL: 182 MG/DL
CO2: 25 MMOL/L (ref 20–31)
CREAT SERPL-MCNC: 1.83 MG/DL (ref 0.5–0.9)
DIFFERENTIAL TYPE: YES
EOSINOPHILS RELATIVE PERCENT: 2 % (ref 0–5)
ESTIMATED AVERAGE GLUCOSE: 117 MG/DL
GFR AFRICAN AMERICAN: 32 ML/MIN
GFR NON-AFRICAN AMERICAN: 26 ML/MIN
GFR SERPL CREATININE-BSD FRML MDRD: ABNORMAL ML/MIN/{1.73_M2}
GFR SERPL CREATININE-BSD FRML MDRD: ABNORMAL ML/MIN/{1.73_M2}
GLUCOSE BLD-MCNC: 109 MG/DL (ref 70–99)
HBA1C MFR BLD: 5.7 % (ref 4.8–5.9)
HCT VFR BLD CALC: 38.9 % (ref 36–46)
HDLC SERPL-MCNC: 62 MG/DL
HEMOGLOBIN: 13 G/DL (ref 12–16)
IMMATURE GRANULOCYTES: NORMAL %
LDL CHOLESTEROL: 83 MG/DL (ref 0–130)
LYMPHOCYTES # BLD: 34 % (ref 15–40)
MAGNESIUM: 1.8 MG/DL (ref 1.6–2.6)
MCH RBC QN AUTO: 29.7 PG (ref 26–34)
MCHC RBC AUTO-ENTMCNC: 33.3 G/DL (ref 31–37)
MCV RBC AUTO: 89.2 FL (ref 80–100)
MONOCYTES # BLD: 6 % (ref 4–8)
NRBC AUTOMATED: NORMAL PER 100 WBC
PATIENT FASTING?: YES
PDW BLD-RTO: 13.5 % (ref 12.1–15.2)
PLATELET # BLD: 202 K/UL (ref 140–450)
PLATELET ESTIMATE: NORMAL
PMV BLD AUTO: NORMAL FL (ref 6–12)
POTASSIUM SERPL-SCNC: 4.3 MMOL/L (ref 3.7–5.3)
RBC # BLD: 4.36 M/UL (ref 4–5.2)
RBC # BLD: NORMAL 10*6/UL
SEG NEUTROPHILS: 57 % (ref 47–75)
SEGMENTED NEUTROPHILS ABSOLUTE COUNT: 3.3 K/UL (ref 2.5–7)
SODIUM BLD-SCNC: 140 MMOL/L (ref 135–144)
TOTAL PROTEIN: 7.8 G/DL (ref 6.4–8.3)
TRIGL SERPL-MCNC: 185 MG/DL
TSH SERPL DL<=0.05 MIU/L-ACNC: 1.35 MIU/L (ref 0.3–5)
VITAMIN D 25-HYDROXY: 46.3 NG/ML (ref 30–100)
VLDLC SERPL CALC-MCNC: ABNORMAL MG/DL (ref 1–30)
WBC # BLD: 5.8 K/UL (ref 3.5–11)
WBC # BLD: NORMAL 10*3/UL

## 2020-03-09 PROCEDURE — 36415 COLL VENOUS BLD VENIPUNCTURE: CPT

## 2020-03-09 PROCEDURE — 83735 ASSAY OF MAGNESIUM: CPT

## 2020-03-09 PROCEDURE — 80053 COMPREHEN METABOLIC PANEL: CPT

## 2020-03-09 PROCEDURE — 85025 COMPLETE CBC W/AUTO DIFF WBC: CPT

## 2020-03-09 PROCEDURE — 80061 LIPID PANEL: CPT

## 2020-03-09 PROCEDURE — 82306 VITAMIN D 25 HYDROXY: CPT

## 2020-03-09 PROCEDURE — 83036 HEMOGLOBIN GLYCOSYLATED A1C: CPT

## 2020-03-09 PROCEDURE — 93005 ELECTROCARDIOGRAM TRACING: CPT

## 2020-03-09 PROCEDURE — 71046 X-RAY EXAM CHEST 2 VIEWS: CPT

## 2020-03-09 PROCEDURE — 84443 ASSAY THYROID STIM HORMONE: CPT

## 2020-03-10 LAB
EKG ATRIAL RATE: 77 BPM
EKG P AXIS: 35 DEGREES
EKG P-R INTERVAL: 168 MS
EKG Q-T INTERVAL: 332 MS
EKG QRS DURATION: 68 MS
EKG QTC CALCULATION (BAZETT): 375 MS
EKG R AXIS: 22 DEGREES
EKG T AXIS: 36 DEGREES
EKG VENTRICULAR RATE: 77 BPM

## 2020-03-10 PROCEDURE — 93010 ELECTROCARDIOGRAM REPORT: CPT | Performed by: INTERNAL MEDICINE

## 2020-03-12 ENCOUNTER — OFFICE VISIT (OUTPATIENT)
Dept: CARDIOLOGY CLINIC | Age: 85
End: 2020-03-12
Payer: MEDICARE

## 2020-03-12 VITALS
DIASTOLIC BLOOD PRESSURE: 70 MMHG | WEIGHT: 157 LBS | SYSTOLIC BLOOD PRESSURE: 130 MMHG | HEART RATE: 90 BPM | OXYGEN SATURATION: 98 % | BODY MASS INDEX: 26.95 KG/M2

## 2020-03-12 PROCEDURE — 99214 OFFICE O/P EST MOD 30 MIN: CPT | Performed by: INTERNAL MEDICINE

## 2020-03-12 PROCEDURE — G8417 CALC BMI ABV UP PARAM F/U: HCPCS | Performed by: INTERNAL MEDICINE

## 2020-03-12 PROCEDURE — 1036F TOBACCO NON-USER: CPT | Performed by: INTERNAL MEDICINE

## 2020-03-12 PROCEDURE — G8427 DOCREV CUR MEDS BY ELIG CLIN: HCPCS | Performed by: INTERNAL MEDICINE

## 2020-03-12 PROCEDURE — 1123F ACP DISCUSS/DSCN MKR DOCD: CPT | Performed by: INTERNAL MEDICINE

## 2020-03-12 PROCEDURE — G8482 FLU IMMUNIZE ORDER/ADMIN: HCPCS | Performed by: INTERNAL MEDICINE

## 2020-03-12 PROCEDURE — 1090F PRES/ABSN URINE INCON ASSESS: CPT | Performed by: INTERNAL MEDICINE

## 2020-03-12 PROCEDURE — 4040F PNEUMOC VAC/ADMIN/RCVD: CPT | Performed by: INTERNAL MEDICINE

## 2020-03-12 NOTE — LETTER
Carrol Garay M.D. [address] [place] [address], [facility]   [phone]        2020        Rene Sharp MD  [address] [place] [address]    RE:   Jacque Schwab  :  10/20/1931    Dear Dr. Raymon Robb:    [address]:  1. Coronary artery disease. 2.  Non-insulin-dependent diabetes. 3.  Mild plaque disease. HISTORY OF PRESENT ILLNESS:  I had the pleasure of seeing Mrs. Joyce Horton in the office on 2020. She is a pleasant 80-year-old female who had a catheterization on 2008, that showed mild plaque disease, with normal LV function. She has mild renal insufficiency, which has been stable, with creatinine in the 1.5 to 1.8 range. She stays at home with her . She remains active. She has had no hospitalizations or procedures. She last saw Dr. Raymon Robb on 2020 (I told her I disagreed with her. Telma Menjivar I am sure it was a complete waste of her time and money. ..). She has had no chest pain or chest discomfort. No unusual shortness of breath. No PND, orthopnea or pedal edema. No syncope or near syncope. She really has no complaints as I see her today. CARDIAC RISK FACTORS:  Hypertension:  Positive. Hyperlipidemia:  Positive. Diabetes:  Positive. Peripheral Vascular Disease:  Negative. Smoking:  Negative. Other Family Members:  Negative. MEDICATIONS AT THIS TIME:  She is on aspirin 81 mg daily, Prevacid 30 mg daily, Tradjenta 5 mg daily, Prinivil 10 mg daily, Mevacor 40 mg nightly, Paxil 20 mg daily, Dyazide 37.5/25 daily. PAST MEDICAL HISTORY:  1.  Non-insulin-dependent diabetes, under excellent control. 2.  Colon cancer in 10/2002, with hemicolectomy, no reoccurrence. 3.  Left knee surgery in .  4.  Very labile hypertension, quite well controlled. 5.  Hyperlipidemia, under good control. 6.  Chronic renal insufficiency, slightly elevated today. 7.  Bilateral knee surgery. FAMILY HISTORY:  Negative for CAD. SOCIAL HISTORY:  She is 80years old, 3 children here, with 3 stepchildren in [place]. Does not exercise. Does not smoke or drink alcohol. Stays with her . REVIEW OF SYSTEMS:  Cardiac as above. Other systems reviewed including constitutional, eyes, ears, nose and throat, cardiovascular, respiratory, GI, , musculoskeletal, integumentary, neurologic, endocrine, hematologic and allergic/immunologic are negative except for what is described above. No weight loss or weight gain. No change in bowel habits. No blood in stool. No fevers, sweats or chills. PHYSICAL EXAMINATION:  VITAL SIGNS:  Her blood pressure was 130/70 with a heart rate of 90 and regular. Respiratory rate 18. O2 saturation 98%. Weight 157 pounds. GENERAL:  She is a pleasant 49-year-old female. Denied pain. She was oriented to person, place and time. Answered questions appropriately. SKIN:  No unusual skin changes. HEENT:  The pupils are equally round and intact. Mucous membranes were dry. NECK:  No JVD. Good carotid pulses. No carotid bruits. No lymphadenopathy or thyromegaly. CARDIOVASCULAR EXAM:  S1 and S2 were normal.  No S3 or S4. Soft systolic blowing type murmur. No diastolic murmur. PMI was normal.  No lift, thrust, or pericardial friction rub. LUNGS:  Quite clear to auscultation and percussion. ABDOMEN:  Soft and nontender. Good bowel sounds. EXTREMITIES:  Good femoral pulses. Good pedal pulses. No pedal edema. Skin was warm and dry. No calf tenderness. Nail beds pink. Good cap refill. PULSES:  Bilateral symmetrical radial, brachial and carotid pulses. No carotid bruits. Good femoral and pedal pulses. NEUROLOGIC EXAM:  Within normal limits. PSYCHIATRIC EXAM:  Within normal limits. LABORATORY DATA:  From 03/09/2020, sodium was 140, potassium 4.3, BUN 26, creatinine 1.83, GFR was 26.   Magnesium was 1.8, glucose 109, calcium was

## 2020-03-16 NOTE — PROGRESS NOTES
Violeta Seay M.D. [address] [place] [address], [place] 80  [phone]        2020        Liam Cantrell MD  [address] [place] [address]    RE:   Nevaeh Rodriges  :  10/20/1931    Dear Dr. Ivonne Osman:    [address]:  1. Coronary artery disease. 2.  Non-insulin-dependent diabetes. 3.  Mild plaque disease. HISTORY OF PRESENT ILLNESS:  I had the pleasure of seeing Mrs. Mar Sarkar in the office on 2020. She is a pleasant 77-year-old female who had a catheterization on 2008, that showed mild plaque disease, with normal LV function. She has mild renal insufficiency, which has been stable, with creatinine in the 1.5 to 1.8 range. She stays at home with her . She remains active. She has had no hospitalizations or procedures. She last saw Dr. Ivonne Osman on 2020 (I told her I disagreed with her. Laxmi Edwards I am sure it was a complete waste of her time and money. ..). She has had no chest pain or chest discomfort. No unusual shortness of breath. No PND, orthopnea or pedal edema. No syncope or near syncope. She really has no complaints as I see her today. CARDIAC RISK FACTORS:  Hypertension:  Positive. Hyperlipidemia:  Positive. Diabetes:  Positive. Peripheral Vascular Disease:  Negative. Smoking:  Negative. Other Family Members:  Negative. MEDICATIONS AT THIS TIME:  She is on aspirin 81 mg daily, Prevacid 30 mg daily, Tradjenta 5 mg daily, Prinivil 10 mg daily, Mevacor 40 mg nightly, Paxil 20 mg daily, Dyazide 37.5/25 daily. PAST MEDICAL HISTORY:  1.  Non-insulin-dependent diabetes, under excellent control. 2.  Colon cancer in 10/2002, with hemicolectomy, no reoccurrence. 3.  Left knee surgery in .  4.  Very labile hypertension, quite well controlled. 5.  Hyperlipidemia, under good control. 6.  Chronic renal insufficiency, slightly elevated today. 7.  Bilateral knee surgery.     FAMILY HDL of 62, LDL 83, triglycerides 185. ALT was 8, AST was 13. Hemoglobin A1c was 5.7. TSH 1.35 with vitamin D of 46.3. White count 5.8, hemoglobin 13.0 with a platelet count of 786,563. EKG showed sinus rhythm with low-voltage QRS, otherwise unremarkable. Chest x-ray was unremarkable. IMPRESSION:  1. Hypertension, quite well controlled. 2.  Non-insulin-dependent diabetes, under good control. 3.  Hyperlipidemia, under good control. 4.  Catheterization on 02/20/2008 after an abnormal stress test that showed mild plaque disease, normal LV function, EF of 50% to 55%. 5.  Chronic renal insufficiency, with a creatinine elevated at 1.83.  6.  Hemicolectomy secondary to colon cancer in 2002. PLAN:  1. Hold Dyazide. 2.  Repeat Chem-7 in 2 weeks. 3.  Daily weights to monitor her fluid status. DISCUSSION:  Mrs. Gudelia Santa overall is doing well. Her creatinine is slightly more elevated. She clinically appeared to be dry as her mucous membranes were dry and she had no edema. I had her stop her Dyazide. She will monitor her weights on a daily basis, and if her weight goes up by more than 3 to 5 pounds, she can call either us or Dr. Carla Samuels and she would take her Dyazide. She could perhaps get by with taking it on an every other day, if needed at all. I will plan on seeing her in a year. Again, we will check her fluid status and her renal function in 2 weeks. Thank you very much for allowing me the privilege of seeing Mrs. Gudelia Santa. If you have any questions on my thoughts, please do not hesitate to contact me.      Sincerely,        Evonne Schwartz    D: 03/12/2020 13:23:42     T: 03/12/2020 13:31:59     [email or web address]  Job#: [id number]   Doc#: [id number]

## 2020-03-26 ENCOUNTER — OFFICE VISIT (OUTPATIENT)
Dept: CARDIOLOGY CLINIC | Age: 85
End: 2020-03-26
Payer: MEDICARE

## 2020-03-26 ENCOUNTER — HOSPITAL ENCOUNTER (OUTPATIENT)
Age: 85
Discharge: HOME | End: 2020-03-26
Payer: MEDICARE

## 2020-03-26 ENCOUNTER — HOSPITAL ENCOUNTER (OUTPATIENT)
Dept: GENERAL RADIOLOGY | Age: 85
Discharge: HOME | End: 2020-03-28
Payer: MEDICARE

## 2020-03-26 ENCOUNTER — HOSPITAL ENCOUNTER (OUTPATIENT)
Age: 85
Discharge: HOME | End: 2020-03-28
Payer: MEDICARE

## 2020-03-26 VITALS
BODY MASS INDEX: 28.49 KG/M2 | WEIGHT: 166 LBS | OXYGEN SATURATION: 94 % | HEART RATE: 86 BPM | SYSTOLIC BLOOD PRESSURE: 120 MMHG | DIASTOLIC BLOOD PRESSURE: 84 MMHG

## 2020-03-26 LAB
ANION GAP SERPL CALCULATED.3IONS-SCNC: 12 MMOL/L (ref 9–17)
BUN BLDV-MCNC: 23 MG/DL (ref 8–23)
BUN/CREAT BLD: 15 (ref 9–20)
CALCIUM SERPL-MCNC: 10.2 MG/DL (ref 8.6–10.4)
CHLORIDE BLD-SCNC: 105 MMOL/L (ref 98–107)
CO2: 27 MMOL/L (ref 20–31)
CREAT SERPL-MCNC: 1.57 MG/DL (ref 0.5–0.9)
EKG ATRIAL RATE: 73 BPM
EKG P-R INTERVAL: 200 MS
EKG Q-T INTERVAL: 336 MS
EKG QRS DURATION: 60 MS
EKG QTC CALCULATION (BAZETT): 370 MS
EKG R AXIS: 67 DEGREES
EKG T AXIS: 80 DEGREES
EKG VENTRICULAR RATE: 73 BPM
GFR AFRICAN AMERICAN: 38 ML/MIN
GFR NON-AFRICAN AMERICAN: 31 ML/MIN
GFR SERPL CREATININE-BSD FRML MDRD: ABNORMAL ML/MIN/{1.73_M2}
GFR SERPL CREATININE-BSD FRML MDRD: ABNORMAL ML/MIN/{1.73_M2}
GLUCOSE BLD-MCNC: 97 MG/DL (ref 70–99)
POTASSIUM SERPL-SCNC: 4.8 MMOL/L (ref 3.7–5.3)
SODIUM BLD-SCNC: 144 MMOL/L (ref 135–144)

## 2020-03-26 PROCEDURE — 71046 X-RAY EXAM CHEST 2 VIEWS: CPT

## 2020-03-26 PROCEDURE — 99211 OFF/OP EST MAY X REQ PHY/QHP: CPT | Performed by: INTERNAL MEDICINE

## 2020-03-26 PROCEDURE — 93005 ELECTROCARDIOGRAM TRACING: CPT

## 2020-03-26 PROCEDURE — 93010 ELECTROCARDIOGRAM REPORT: CPT | Performed by: INTERNAL MEDICINE

## 2020-03-26 PROCEDURE — 36415 COLL VENOUS BLD VENIPUNCTURE: CPT

## 2020-03-26 PROCEDURE — G8417 CALC BMI ABV UP PARAM F/U: HCPCS | Performed by: INTERNAL MEDICINE

## 2020-03-26 PROCEDURE — G8428 CUR MEDS NOT DOCUMENT: HCPCS | Performed by: INTERNAL MEDICINE

## 2020-03-26 PROCEDURE — 80048 BASIC METABOLIC PNL TOTAL CA: CPT

## 2020-03-26 NOTE — PROGRESS NOTES
Ov DR Seble Ritchie 2 week follow up  No ankle edema. Weight up at home  No c/o chest pain or sob  Will take dyazide every other day  Has appt in 1 year.

## 2020-03-30 NOTE — PROGRESS NOTES
Cardiology    Her weight has increased by 5 pounds. Will start Dyazide every other day, as it does not appear a prn regiment will work for her.     Doe Samuel MD

## 2020-06-09 RX ORDER — PAROXETINE HYDROCHLORIDE 20 MG/1
TABLET, FILM COATED ORAL
Qty: 30 TABLET | Refills: 5 | Status: SHIPPED | OUTPATIENT
Start: 2020-06-09 | End: 2020-12-23

## 2020-06-10 ENCOUNTER — HOSPITAL ENCOUNTER (OUTPATIENT)
Age: 85
Setting detail: SPECIMEN
Discharge: HOME | End: 2020-06-10
Payer: MEDICARE

## 2020-06-10 ENCOUNTER — OFFICE VISIT (OUTPATIENT)
Dept: PRIMARY CARE CLINIC | Age: 85
End: 2020-06-10

## 2020-06-10 ENCOUNTER — APPOINTMENT (OUTPATIENT)
Dept: CT IMAGING | Age: 85
End: 2020-06-10
Payer: MEDICARE

## 2020-06-10 ENCOUNTER — HOSPITAL ENCOUNTER (EMERGENCY)
Age: 85
Discharge: HOME | End: 2020-06-10
Attending: INTERNAL MEDICINE
Payer: MEDICARE

## 2020-06-10 VITALS
DIASTOLIC BLOOD PRESSURE: 85 MMHG | SYSTOLIC BLOOD PRESSURE: 137 MMHG | TEMPERATURE: 97.7 F | WEIGHT: 156 LBS | HEART RATE: 54 BPM | OXYGEN SATURATION: 96 % | BODY MASS INDEX: 26.78 KG/M2

## 2020-06-10 VITALS
SYSTOLIC BLOOD PRESSURE: 126 MMHG | HEART RATE: 72 BPM | OXYGEN SATURATION: 96 % | TEMPERATURE: 97.9 F | WEIGHT: 156 LBS | DIASTOLIC BLOOD PRESSURE: 65 MMHG | RESPIRATION RATE: 16 BRPM | BODY MASS INDEX: 26.78 KG/M2

## 2020-06-10 LAB
-: ABNORMAL
ABSOLUTE EOS #: ABNORMAL K/UL (ref 0–0.4)
ABSOLUTE IMMATURE GRANULOCYTE: ABNORMAL K/UL (ref 0–0.3)
ABSOLUTE LYMPH #: 1.75 K/UL (ref 1–4.8)
ABSOLUTE MONO #: 0.37 K/UL (ref 0–1)
ALBUMIN SERPL-MCNC: 4.6 G/DL (ref 3.5–5.2)
ALBUMIN/GLOBULIN RATIO: ABNORMAL (ref 1–2.5)
ALP BLD-CCNC: 105 U/L (ref 35–104)
ALT SERPL-CCNC: 8 U/L (ref 5–33)
AMORPHOUS: ABNORMAL
AMYLASE: 60 U/L (ref 28–100)
ANION GAP SERPL CALCULATED.3IONS-SCNC: 11 MMOL/L (ref 9–17)
AST SERPL-CCNC: 10 U/L
ATYPICAL LYMPHOCYTE ABSOLUTE COUNT: 0.07 K/UL (ref 0–1)
ATYPICAL LYMPHOCYTES: 1 %
BACTERIA: ABNORMAL
BASOPHILS # BLD: 1 % (ref 0–2)
BASOPHILS ABSOLUTE: 0.07 K/UL (ref 0–0.2)
BILIRUB SERPL-MCNC: 0.43 MG/DL (ref 0.3–1.2)
BILIRUBIN URINE: NEGATIVE
BUN BLDV-MCNC: 27 MG/DL (ref 8–23)
BUN/CREAT BLD: 18 (ref 9–20)
CALCIUM SERPL-MCNC: 11.1 MG/DL (ref 8.6–10.4)
CASTS UA: ABNORMAL /LPF
CHLORIDE BLD-SCNC: 106 MMOL/L (ref 98–107)
CO2: 22 MMOL/L (ref 20–31)
COLOR: YELLOW
COMMENT UA: ABNORMAL
CREAT SERPL-MCNC: 1.5 MG/DL (ref 0.5–0.9)
CRYSTALS, UA: ABNORMAL /HPF
DIFFERENTIAL TYPE: ABNORMAL
EOSINOPHILS RELATIVE PERCENT: ABNORMAL % (ref 0–5)
EPITHELIAL CELLS UA: ABNORMAL /HPF
GFR AFRICAN AMERICAN: 40 ML/MIN
GFR NON-AFRICAN AMERICAN: 33 ML/MIN
GFR SERPL CREATININE-BSD FRML MDRD: ABNORMAL ML/MIN/{1.73_M2}
GFR SERPL CREATININE-BSD FRML MDRD: ABNORMAL ML/MIN/{1.73_M2}
GLUCOSE BLD-MCNC: 111 MG/DL (ref 70–99)
GLUCOSE URINE: NEGATIVE
HCT VFR BLD CALC: 37.9 % (ref 36–46)
HEMOGLOBIN: 13 G/DL (ref 12–16)
IMMATURE GRANULOCYTES: ABNORMAL %
KETONES, URINE: ABNORMAL
LEUKOCYTE ESTERASE, URINE: ABNORMAL
LIPASE: 41 U/L (ref 13–60)
LYMPHOCYTES # BLD: 24 % (ref 15–40)
MCH RBC QN AUTO: 30.1 PG (ref 26–34)
MCHC RBC AUTO-ENTMCNC: 34.3 G/DL (ref 31–37)
MCV RBC AUTO: 87.7 FL (ref 80–100)
METAMYELOCYTES ABSOLUTE COUNT: 0.07 K/UL
METAMYELOCYTES: 1 %
MONOCYTES # BLD: 5 % (ref 4–8)
MORPHOLOGY: ABNORMAL
MUCUS: ABNORMAL
NITRITE, URINE: NEGATIVE
NRBC AUTOMATED: ABNORMAL PER 100 WBC
OTHER OBSERVATIONS UA: ABNORMAL
PDW BLD-RTO: 13 % (ref 12.1–15.2)
PH UA: 5 (ref 5–8)
PLATELET # BLD: 195 K/UL (ref 140–450)
PLATELET ESTIMATE: ABNORMAL
PMV BLD AUTO: ABNORMAL FL
POTASSIUM SERPL-SCNC: 4.5 MMOL/L (ref 3.7–5.3)
PROTEIN UA: NEGATIVE
RBC # BLD: 4.32 M/UL (ref 4–5.2)
RBC # BLD: ABNORMAL 10*6/UL
RBC UA: ABNORMAL /HPF (ref 0–2)
RENAL EPITHELIAL, UA: ABNORMAL /HPF
SEG NEUTROPHILS: 68 % (ref 47–75)
SEGMENTED NEUTROPHILS ABSOLUTE COUNT: 4.97 K/UL (ref 2.5–7)
SODIUM BLD-SCNC: 139 MMOL/L (ref 135–144)
SPECIFIC GRAVITY UA: 1.01 (ref 1–1.03)
TOTAL PROTEIN: 8 G/DL (ref 6.4–8.3)
TRICHOMONAS: ABNORMAL
TROPONIN INTERP: NORMAL
TROPONIN T: <0.03 NG/ML
TROPONIN, HIGH SENSITIVITY: NORMAL NG/L (ref 0–14)
TURBIDITY: ABNORMAL
URINE HGB: ABNORMAL
UROBILINOGEN, URINE: NORMAL
WBC # BLD: 7.3 K/UL (ref 3.5–11)
WBC # BLD: ABNORMAL 10*3/UL
WBC UA: ABNORMAL /HPF
YEAST: ABNORMAL

## 2020-06-10 PROCEDURE — 87086 URINE CULTURE/COLONY COUNT: CPT

## 2020-06-10 PROCEDURE — 2580000003 HC RX 258: Performed by: INTERNAL MEDICINE

## 2020-06-10 PROCEDURE — 93005 ELECTROCARDIOGRAM TRACING: CPT | Performed by: INTERNAL MEDICINE

## 2020-06-10 PROCEDURE — 87186 SC STD MICRODIL/AGAR DIL: CPT

## 2020-06-10 PROCEDURE — 85025 COMPLETE CBC W/AUTO DIFF WBC: CPT

## 2020-06-10 PROCEDURE — 96375 TX/PRO/DX INJ NEW DRUG ADDON: CPT

## 2020-06-10 PROCEDURE — U0003 INFECTIOUS AGENT DETECTION BY NUCLEIC ACID (DNA OR RNA); SEVERE ACUTE RESPIRATORY SYNDROME CORONAVIRUS 2 (SARS-COV-2) (CORONAVIRUS DISEASE [COVID-19]), AMPLIFIED PROBE TECHNIQUE, MAKING USE OF HIGH THROUGHPUT TECHNOLOGIES AS DESCRIBED BY CMS-2020-01-R: HCPCS

## 2020-06-10 PROCEDURE — 99284 EMERGENCY DEPT VISIT MOD MDM: CPT

## 2020-06-10 PROCEDURE — 80053 COMPREHEN METABOLIC PANEL: CPT

## 2020-06-10 PROCEDURE — 81001 URINALYSIS AUTO W/SCOPE: CPT

## 2020-06-10 PROCEDURE — 6370000000 HC RX 637 (ALT 250 FOR IP): Performed by: INTERNAL MEDICINE

## 2020-06-10 PROCEDURE — 83690 ASSAY OF LIPASE: CPT

## 2020-06-10 PROCEDURE — 87088 URINE BACTERIA CULTURE: CPT

## 2020-06-10 PROCEDURE — 82150 ASSAY OF AMYLASE: CPT

## 2020-06-10 PROCEDURE — 2500000003 HC RX 250 WO HCPCS: Performed by: INTERNAL MEDICINE

## 2020-06-10 PROCEDURE — 84484 ASSAY OF TROPONIN QUANT: CPT

## 2020-06-10 PROCEDURE — 70450 CT HEAD/BRAIN W/O DYE: CPT

## 2020-06-10 PROCEDURE — 96374 THER/PROPH/DIAG INJ IV PUSH: CPT

## 2020-06-10 PROCEDURE — 6360000002 HC RX W HCPCS: Performed by: INTERNAL MEDICINE

## 2020-06-10 PROCEDURE — 36415 COLL VENOUS BLD VENIPUNCTURE: CPT

## 2020-06-10 RX ORDER — SULFAMETHOXAZOLE AND TRIMETHOPRIM 800; 160 MG/1; MG/1
1 TABLET ORAL 2 TIMES DAILY
Qty: 14 TABLET | Refills: 0 | Status: SHIPPED | OUTPATIENT
Start: 2020-06-10 | End: 2020-06-17

## 2020-06-10 RX ORDER — SULFAMETHOXAZOLE AND TRIMETHOPRIM 800; 160 MG/1; MG/1
1 TABLET ORAL ONCE
Status: COMPLETED | OUTPATIENT
Start: 2020-06-10 | End: 2020-06-10

## 2020-06-10 RX ORDER — ONDANSETRON 2 MG/ML
4 INJECTION INTRAMUSCULAR; INTRAVENOUS ONCE
Status: COMPLETED | OUTPATIENT
Start: 2020-06-10 | End: 2020-06-10

## 2020-06-10 RX ORDER — ONDANSETRON 4 MG/1
4 TABLET, ORALLY DISINTEGRATING ORAL EVERY 8 HOURS PRN
Qty: 10 TABLET | Refills: 0 | Status: SHIPPED | OUTPATIENT
Start: 2020-06-10 | End: 2022-09-30

## 2020-06-10 RX ORDER — SODIUM CHLORIDE, SODIUM LACTATE, POTASSIUM CHLORIDE, CALCIUM CHLORIDE 600; 310; 30; 20 MG/100ML; MG/100ML; MG/100ML; MG/100ML
1000 INJECTION, SOLUTION INTRAVENOUS ONCE
Status: DISCONTINUED | OUTPATIENT
Start: 2020-06-10 | End: 2020-06-10

## 2020-06-10 RX ORDER — 0.9 % SODIUM CHLORIDE 0.9 %
1000 INTRAVENOUS SOLUTION INTRAVENOUS ONCE
Status: COMPLETED | OUTPATIENT
Start: 2020-06-10 | End: 2020-06-10

## 2020-06-10 RX ADMIN — FAMOTIDINE 20 MG: 10 INJECTION, SOLUTION INTRAVENOUS at 13:17

## 2020-06-10 RX ADMIN — SULFAMETHOXAZOLE AND TRIMETHOPRIM 1 TABLET: 800; 160 TABLET ORAL at 16:21

## 2020-06-10 RX ADMIN — ONDANSETRON 4 MG: 2 INJECTION INTRAMUSCULAR; INTRAVENOUS at 13:17

## 2020-06-10 RX ADMIN — SODIUM CHLORIDE 1000 ML: 9 INJECTION, SOLUTION INTRAVENOUS at 13:43

## 2020-06-10 ASSESSMENT — ENCOUNTER SYMPTOMS
SHORTNESS OF BREATH: 0
ALLERGIC/IMMUNOLOGIC NEGATIVE: 1
RESPIRATORY NEGATIVE: 1
DIARRHEA: 0
COUGH: 0
EYES NEGATIVE: 1
ABDOMINAL PAIN: 0
NAUSEA: 1
VOMITING: 1

## 2020-06-10 NOTE — PATIENT INSTRUCTIONS
SURVEY:    You may be receiving a survey from [employer] regarding your visit today. Please complete the survey to enable us to provide the highest quality of care to you and your family. If you cannot score us a very good on any question, please call the office to discuss how we could of made your experience a very good one. Thank you. Patient Education        Dehydration: Care Instructions  Your Care Instructions  Dehydration happens when your body loses too much fluid. This might happen when you do not drink enough water or you lose large amounts of fluids from your body because of diarrhea, vomiting, or sweating. Severe dehydration can be life-threatening. Water and minerals called electrolytes help put your body fluids back in balance. Learn the early signs of fluid loss, and drink more fluids to prevent dehydration. Follow-up care is a key part of your treatment and safety. Be sure to make and go to all appointments, and call your doctor if you are having problems. It's also a good idea to know your test results and keep a list of the medicines you take. How can you care for yourself at home? · To prevent dehydration, drink plenty of fluids, enough so that your urine is light yellow or clear like water. Choose water and other caffeine-free clear liquids until you feel better. If you have kidney, heart, or liver disease and have to limit fluids, talk with your doctor before you increase the amount of fluids you drink. · If you do not feel like eating or drinking, try taking small sips of water, sports drinks, or other rehydration drinks. · Get plenty of rest.  To prevent dehydration  · Add more fluids to your diet and daily routine, unless your doctor has told you not to. · During hot weather, drink more fluids. Drink even more fluids if you exercise a lot. Stay away from drinks with alcohol or caffeine. · Watch for the symptoms of dehydration. These include:  ?  A dry, sticky

## 2020-06-10 NOTE — ED PROVIDER NOTES
normal.     LeftEar: External ear normal.     Nose: Nose normal.     Mouth/Throat: Oropharynx is clear and mucosa moist. No oropharyngeal exudate noted. Posterior pharynx is pink and noninjected. Eyes: Conjunctivae and EOM are normal. Pupils are equal,round, and reactive to light. No scleral icterus. Neck: Normal range of motion. Neck supple. No tracheal deviation present. Cardiovascular: Normal rate, regular rhythm, normal heartsounds and intact distal pulses. Exam reveals no gallop or friction rub. No murmur heard. Pulmonary/Chest: Effort normal and breath sounds are symmetric and normal. No respiratory distress. There are no wheezes, rales or rhonchi. No tenderness is exhibited upon palpation of the chest wall. Abdominal: Soft. Bowel sounds are normal. No distension or no mass exhibitted. There is no tenderness, rebound, rigidity or guarding. Genitourinary:   No CVA tenderness noted on examination. Musculoskeletal: Normal range of motion. No edema, tenderness or deformity. Lymphadenopathy:  No cervical adenopathy. Neurological:   alert and oriented to person, place, and time. Normal speech, normal comprehension, normal cognition,  Reflexes are normal.  There are no cranial nerve deficits. Normal muscle tone, motor and sensory function including SILT exhibited. Coordination normal and gait normal.  Normal finger to nose testing. Skin: Skin is warm and dry. No rash noted. No diaphoresis. No erythema. No pallor. Psychiatric: Pleasant and cooperative. Normal mood and affect. Behavior is  normal. Judgment and thought content normal.     DIAGNOSTIC RESULTS     EKG: All EKG's are interpreted by the Emergency Department Physician who either signs or Co-signs this chart in the absence of a cardiologist.    A 12-lead EKG was performed at 1316. There is normal sinus rhythm with a ventricular rate of 83 bpm.  There is a normal ME interval, QRS duration, QT, QTC and axis.   No acute ST segment or T wave SpO2: 98% 97% (!) 80% 96%   Weight: 156 lb (70.8 kg)          Noted    MDM    CRITICAL CARE TIME   Total Critical Care time was 0 minutes       [facility]  ED Course as of Danie 10 2013   Wed Danie 10, 2020   1413 Patient feels a little bit better now. She is probably able to give a urine sample. I told him that overall her blood work is stable. [MS]      ED Course User Index  [MS] Gris Alamo MD       CONSULTS:  None    PROCEDURES:  Unless otherwise noted below, none     Procedures      Nalini Lopez is a 80 y.o. female who has a history of colon cancer, coronary disease, hypertension, hyperlipidemia, chronic renal insufficiency, type 2 diabetes, major depression, presented for dizziness, an element of dehydration and UTI. Will treat with Zofran and antibiotics. She is improved, well, well hydrated, nontoxic, hemodynamically stable and satisfactory for discharge for outpatient management. Findings discussed at length with patient and her daughter. The patient was evaluated during the global COVID-19  pandemic, and that diagnosis was suspected/considered upon their initial presentation. Their evaluation, treatment and testing was consistent with current guidelines for patients who present with complaints or symptoms that may be related to COVID-19 . The patient did not meet criteria for COVID-19 testing per current protocol. We recommend COVID-19 testing as per current recommendations if symptoms worsen including fever and difficulty breathing and any other concerns or indications should arise. I instructed the patient to followup with the PCP for evaluation of response to management of acute illness. I instructed the patient to return to the ER if his condition worsens, if there is any concern for altered mental status, difficulty breathing, dehydration or loss of function.         Patient Course:   ED Course as of Danie 10 2013   Wed Danie 10, 2020   1413 Patient feels a little bit better now. She is probably able to give a urine sample. I told him that overall her blood work is stable. [MS]      ED Course User Index  [MS] Nabil Ding MD         ED Medicationsadministered this visit:    Medications   0.9 % sodium chloride bolus (0 mLs Intravenous Stopped 6/10/20 1551)   ondansetron (ZOFRAN) injection 4 mg (4 mg Intravenous Given 6/10/20 1317)   famotidine (PEPCID) injection 20 mg (20 mg Intravenous Given 6/10/20 1317)   sulfamethoxazole-trimethoprim (BACTRIM DS;SEPTRA DS) 800-160 MG per tablet 1 tablet (1 tablet Oral Given 6/10/20 1621)       New Prescriptions from this visit:    Discharge Medication List as of 6/10/2020  4:13 PM      START taking these medications    Details   sulfamethoxazole-trimethoprim (BACTRIM DS) 800-160 MG per tablet Take 1 tablet by mouth 2 times daily for 7 days, Disp-14 tablet, R-0Print      ondansetron (ZOFRAN ODT) 4 MG disintegrating tablet Take 1 tablet by mouth every 8 hours as needed for Nausea or Vomiting, Disp-10 tablet, R-0Print             Follow-up:  Omaira Walter MD  [address] [place] [address]  [phone]    Schedule an appointment as soon as possible for a visit in 3 days      [place] [address] [address]  [phone]  Go in 1 week  As needed, If symptoms worsen        Final Impression:   1. Dizziness    2. Dehydration    3. Urinary tract infection with hematuria, site unspecified               (Please note that portions of this note werecompleted with a voice recognition program.  Efforts were made to edit the dictations but occasionally words are mis-transcribed.)    FINAL IMPRESSION      1. Dizziness    2. Dehydration    3.  Urinary tract infection with hematuria, site unspecified          DISPOSITION/PLAN   DISPOSITION Decision To Discharge 06/10/2020 04:12:03 PM      PATIENT REFERRED TO:  Omaira Walter MD  [address] [place] [address]  [phone]    Schedule an appointment as soon as

## 2020-06-11 ENCOUNTER — CARE COORDINATION (OUTPATIENT)
Dept: CARE COORDINATION | Age: 85
End: 2020-06-11

## 2020-06-11 ENCOUNTER — TELEPHONE (OUTPATIENT)
Dept: FAMILY MEDICINE CLINIC | Age: 85
End: 2020-06-11

## 2020-06-11 ENCOUNTER — OFFICE VISIT (OUTPATIENT)
Dept: FAMILY MEDICINE CLINIC | Age: 85
End: 2020-06-11
Payer: MEDICARE

## 2020-06-11 VITALS
SYSTOLIC BLOOD PRESSURE: 114 MMHG | TEMPERATURE: 97.3 F | HEIGHT: 64 IN | BODY MASS INDEX: 26.98 KG/M2 | WEIGHT: 158 LBS | HEART RATE: 90 BPM | DIASTOLIC BLOOD PRESSURE: 70 MMHG

## 2020-06-11 LAB
EKG ATRIAL RATE: 83 BPM
EKG P AXIS: 8 DEGREES
EKG P-R INTERVAL: 170 MS
EKG Q-T INTERVAL: 332 MS
EKG QRS DURATION: 60 MS
EKG QTC CALCULATION (BAZETT): 390 MS
EKG R AXIS: 56 DEGREES
EKG T AXIS: -15 DEGREES
EKG VENTRICULAR RATE: 83 BPM

## 2020-06-11 PROCEDURE — 99213 OFFICE O/P EST LOW 20 MIN: CPT | Performed by: INTERNAL MEDICINE

## 2020-06-11 PROCEDURE — 1090F PRES/ABSN URINE INCON ASSESS: CPT | Performed by: INTERNAL MEDICINE

## 2020-06-11 PROCEDURE — G8427 DOCREV CUR MEDS BY ELIG CLIN: HCPCS | Performed by: INTERNAL MEDICINE

## 2020-06-11 PROCEDURE — 4040F PNEUMOC VAC/ADMIN/RCVD: CPT | Performed by: INTERNAL MEDICINE

## 2020-06-11 PROCEDURE — G8510 SCR DEP NEG, NO PLAN REQD: HCPCS | Performed by: INTERNAL MEDICINE

## 2020-06-11 PROCEDURE — 93010 ELECTROCARDIOGRAM REPORT: CPT | Performed by: INTERNAL MEDICINE

## 2020-06-11 PROCEDURE — 1123F ACP DISCUSS/DSCN MKR DOCD: CPT | Performed by: INTERNAL MEDICINE

## 2020-06-11 PROCEDURE — G8417 CALC BMI ABV UP PARAM F/U: HCPCS | Performed by: INTERNAL MEDICINE

## 2020-06-11 PROCEDURE — 1036F TOBACCO NON-USER: CPT | Performed by: INTERNAL MEDICINE

## 2020-06-11 ASSESSMENT — PATIENT HEALTH QUESTIONNAIRE - PHQ9
1. LITTLE INTEREST OR PLEASURE IN DOING THINGS: 0
SUM OF ALL RESPONSES TO PHQ9 QUESTIONS 1 & 2: 0
SUM OF ALL RESPONSES TO PHQ QUESTIONS 1-9: 0
SUM OF ALL RESPONSES TO PHQ QUESTIONS 1-9: 0
2. FEELING DOWN, DEPRESSED OR HOPELESS: 0

## 2020-06-11 ASSESSMENT — ENCOUNTER SYMPTOMS
SHORTNESS OF BREATH: 0
VOMITING: 1
DIARRHEA: 0
NAUSEA: 1
SORE THROAT: 0
CONSTIPATION: 0
ABDOMINAL PAIN: 0
RHINORRHEA: 0
COUGH: 0
BLOOD IN STOOL: 0
CHEST TIGHTNESS: 0

## 2020-06-11 NOTE — PROGRESS NOTES
Subjective:      Patient ID: Alia Swartz is a 80 y.o. female. [place] was seen in the ER yesterday for nausea, vomiting, and dizziness. She states she was having this for a few days (started 3 days ago). Work up from the ER reviewed (labs, UA, CT head). She was sent home on Bactrim DS and Zofran. [place] states she is feeling a little better today. She has been able to drink water today. [place] states she is moving her bowels and denies any trouble urinating. No chest pain or SOB. Review of Systems   Constitutional: Negative. HENT: Negative for congestion, ear pain, rhinorrhea, sneezing and sore throat. Eyes: Negative for visual disturbance. Respiratory: Negative for cough, chest tightness and shortness of breath. Cardiovascular: Negative for chest pain and palpitations. Gastrointestinal: Positive for nausea and vomiting. Negative for abdominal pain, blood in stool, constipation and diarrhea. Genitourinary: Negative for difficulty urinating, dysuria, frequency, menstrual problem and urgency. Musculoskeletal: Negative for arthralgias, joint swelling, myalgias and neck pain. Skin: Negative. Neurological: Positive for dizziness. Negative for syncope. Psychiatric/Behavioral: Negative. Objective:   Physical Exam  Constitutional:       Appearance: She is well-developed. HENT:      Head: Atraumatic. Eyes:      Conjunctiva/sclera: Conjunctivae normal.   Neck:      Musculoskeletal: Normal range of motion and neck supple. Cardiovascular:      Rate and Rhythm: Normal rate and regular rhythm. Heart sounds: Murmur present. Pulmonary:      Effort: Pulmonary effort is normal.      Breath sounds: Normal breath sounds. Abdominal:      Palpations: Abdomen is soft. Tenderness: There is no abdominal tenderness. Musculoskeletal: Normal range of motion. Lymphadenopathy:      Cervical: No cervical adenopathy. Skin:     Findings: No rash.    Neurological:

## 2020-06-11 NOTE — TELEPHONE ENCOUNTER
[facility]) ED Follow up Call    Reason for ED visit:  Nausea, vomiting      6/11/2020     [facility] , this is [place] from Dr. Winifred Melgar office, just calling to see how you are doing after your recent ED visit. Did you receive discharge instructions? Yes  Do you understand the discharge instructions? Yes  Did the ED give you any new prescriptions? Yes  Were you able to fill your prescriptions? Yes      Do you have one of our red, yellow and green  Zone sheets that help you to determine when you should go to the ED? Yes      Do you need or want to make a follow up appt with your PCP? Yes today @ 4pm    Do you have any further needs in the home, e.g. equipment? No        FU appts/Provider:    Future Appointments   Date Time Provider Pradeep Coello   6/11/2020  4:15 PM MD Xavier Henning [facility]   8/28/2020 10:30 AM MD Xavier Henning  [email or web address]   11/16/2020  2:30 PM Gabino Bhagat MD [email or web address]   3/8/2021 10:30 AM [email or web address]         VOIC[employer]AIL DOCUMENTATION - ERASE IF NOT USED  Hi, this message is for [place]. This is Tom Knott from [facility] office. Just calling to see how you are doing after your recent visit to the Emergency Room. Dr.Billy Ardon wants to make sure you were able to fill any prescriptions and that you understand your discharge instructions. Please return our call if you need to make a follow up appointment with your provider or have any further needs. Our phone number is [phone]. Have a great day.

## 2020-06-11 NOTE — CARE COORDINATION
Patient contacted regarding [place] Payment. Discussed COVID-19 related testing which was pending at this time. Test results were pending. Patient informed of results, if available? Still pending    Care Transition Nurse/ Ambulatory Care Manager contacted the patient by telephone to perform post discharge assessment. Verified name and  with patient as identifiers. Provided introduction to self, and explanation of the CTN/ACM role, and reason for call due to risk factors for infection and/or exposure to COVID-19. Symptoms reviewed with patient who verbalized the following symptoms: nausea, no new symptoms and no worsening symptoms. Due to no new or worsening symptoms encounter was not routed to provider for escalation. Discussed follow-up appointments. If no appointment was previously scheduled, appointment scheduling offered: patient will call to [facility] follow up appointment(s):   Future Appointments   Date Time Provider Pradeep Coello   2020 10:30 AM Carroll Joshua MD [facility]   2020  2:30 PM Carroll Joshua MD [facility]   3/8/2021 10:30 AM MD Cornelia Ramirez [facility] follow up appointment(s): no     Patient has following risk factors of: diabetes and CA. CTN/ACM reviewed discharge instructions, medical action plan and red flags such as increased shortness of breath, increasing fever and signs of decompensation with patient who verbalized understanding. Discussed exposure protocols and quarantine with CDC Guidelines What to do if you are sick with coronavirus disease .  Patient was given an opportunity for questions and concerns. The patient agrees to contact the [facility] exposure line [phone], [facility] department [place] [facility]: ([phone]) and PCP office for questions related to their healthcare. CTN/ACM provided contact information for future needs.     Reviewed and educated patient on any new and changed medications related to discharge diagnosis     Patient/family/caregiver given information for [employer] Loop and agrees to enroll yes  Patient's preferred e-mail: no email   Patient's preferred phone number: [phone]  Based on Loop alert triggers, patient will be contacted by nurse care manager for worsening symptoms. Patient did  Bactrim and Zofran from pharmacy. Patient will call PCP for appointment to follow up on UTI. Patient waiting for Covid 19 test results  Healthcare Decision Makers updated  Pt will be further monitored by COVID Loop Team based on severity of symptoms and risk factors.

## 2020-06-12 LAB
CULTURE: ABNORMAL
Lab: ABNORMAL
SPECIMEN DESCRIPTION: ABNORMAL

## 2020-06-13 LAB — SARS-COV-2, NAA: NOT DETECTED

## 2020-08-13 RX ORDER — LISINOPRIL 10 MG/1
TABLET ORAL
Qty: 30 TABLET | Refills: 5 | Status: SHIPPED | OUTPATIENT
Start: 2020-08-13 | End: 2021-02-18

## 2020-08-13 NOTE — TELEPHONE ENCOUNTER
Health Maintenance   Topic Date Due    DTaP/Tdap/Td vaccine (1 - Tdap) 10[birth year]20/1950    Shingles Vaccine (1 of 2) 10/20/1981    Flu vaccine (1) 09/01/2020    Annual Wellness Visit (AWV)  11/01/2020    Lipid screen  03/09/2021    Potassium monitoring  06/10/2021    Creatinine monitoring  06/10/2021    Pneumococcal 65+ years Vaccine  Completed    Hepatitis A vaccine  Aged Out    Hib vaccine  Aged Out    Meningococcal (ACWY) vaccine  Aged Out             (applicable per patient's age: Cancer Screenings, Depression Screening, Fall Risk Screening, Immunizations)    Hemoglobin A1C (%)   Date Value   03/09/2020 5.7   03/20/2019 5.5   03/19/2018 5.7     Microalb/Crt.  Ratio (mcg/mg creat)   Date Value   08/07/2013 132     LDL Cholesterol (mg/dL)   Date Value   03/09/2020 83     AST (U/L)   Date Value   06/10/2020 10     ALT (U/L)   Date Value   06/10/2020 8     BUN (mg/dL)   Date Value   06/10/2020 27 (H)      (goal A1C is < 7)   (goal LDL is <100) need 30-50% reduction from baseline     BP Readings from Last 3 Encounters:   06/11/20 114/70   06/10/20 126/65   06/10/20 137/85    (goal /80)      All Future Testing planned in [facility]:  Lab Frequency Next Occurrence       Next Visit Date:  Future Appointments   Date Time Provider Pradeep Coello   [facility]28[facility]2020 10:30 [facility]   11/16/2020  2:30 PM MD Xavier Melendez [facility]   3/8/2021 10:30 AM MD Randolph Ordonez [facility]            Patient Active Problem List:     Vitamin D deficiency     Osteoporosis, senile     Mixed hyperlipidemia     History of colon cancer     GERD (gastroesophageal reflux disease)     CAD (coronary artery disease)     Benign essential HTN     Hypercholesterolemia     Cancer ([facility]     Osteoarthrosis     Colon cancer ([facility]     CRI (chronic renal insufficiency)     Controlled type 2 diabetes mellitus with stage 2 chronic kidney disease, without long-term current use of insulin ([address]     Major depressive disorder with single episode, in full remission [facility]

## 2020-08-28 ENCOUNTER — OFFICE VISIT (OUTPATIENT)
Dept: FAMILY MEDICINE CLINIC | Age: 85
End: 2020-08-28
Payer: MEDICARE

## 2020-08-28 VITALS
DIASTOLIC BLOOD PRESSURE: 80 MMHG | SYSTOLIC BLOOD PRESSURE: 139 MMHG | HEART RATE: 75 BPM | BODY MASS INDEX: 26.98 KG/M2 | WEIGHT: 158 LBS | HEIGHT: 64 IN | TEMPERATURE: 97.5 F

## 2020-08-28 PROCEDURE — 4040F PNEUMOC VAC/ADMIN/RCVD: CPT | Performed by: INTERNAL MEDICINE

## 2020-08-28 PROCEDURE — 1036F TOBACCO NON-USER: CPT | Performed by: INTERNAL MEDICINE

## 2020-08-28 PROCEDURE — 1123F ACP DISCUSS/DSCN MKR DOCD: CPT | Performed by: INTERNAL MEDICINE

## 2020-08-28 PROCEDURE — 99214 OFFICE O/P EST MOD 30 MIN: CPT | Performed by: INTERNAL MEDICINE

## 2020-08-28 PROCEDURE — G8427 DOCREV CUR MEDS BY ELIG CLIN: HCPCS | Performed by: INTERNAL MEDICINE

## 2020-08-28 PROCEDURE — 1090F PRES/ABSN URINE INCON ASSESS: CPT | Performed by: INTERNAL MEDICINE

## 2020-08-28 PROCEDURE — G8417 CALC BMI ABV UP PARAM F/U: HCPCS | Performed by: INTERNAL MEDICINE

## 2020-08-28 ASSESSMENT — ENCOUNTER SYMPTOMS
CONSTIPATION: 0
DIARRHEA: 0
ABDOMINAL PAIN: 0
NAUSEA: 0
CHEST TIGHTNESS: 0
SORE THROAT: 0
COUGH: 0
BLOOD IN STOOL: 0
SHORTNESS OF BREATH: 0
RHINORRHEA: 0

## 2020-08-28 NOTE — PATIENT INSTRUCTIONS
SURVEY:    You may be receiving a survey from [employer] regarding your visit today. Please complete the survey to enable us to provide the highest quality of care to you and your family. If you cannot score us a very good on any question, please call the office to discuss how we could have made your experience a very good one. Thank you. 1. Controlled type 2 diabetes mellitus with stage 2 chronic kidney disease, without long-term current use of insulin ([address]  Controlled on the current medication. Repeat HgbA1C in 6 months.  - Hemoglobin A1C; Future    2. Malignant neoplasm of colon, unspecified part of colon ([address]  S/P colon resection with was a curable treatment. Last colonoscopy was in 2017 with 3 adenomatous polyps. Recommended colonoscopy but Solomon Villegas states that at her age she does not wish to pursue any further colonoscopies. 3. Vitamin D deficiency  Controlled on Vitamin D replacement. 4. Mixed hyperlipidemia  Controlled on statin. 5. Gastroesophageal reflux disease without esophagitis  Controlled on Prevacid. 6. Coronary artery disease due to lipid rich plaque  No chest pain or increased SOB. 7. Benign essential HTN  BP controlled on the current medication. 8. Chronic renal impairment, stage 3 (moderate) (HCC)  Stable  Avoid NSAIDS. 9. Major depressive disorder with single episode, in full remission ([address]  Controlled on Paxil. Solomon Villegas was instructed to follow up in the clinic in 6 months for check up or as needed with any medical issues.

## 2020-08-28 NOTE — PROGRESS NOTES
Subjective:      Patient ID: Hollie Aldana is a 80 y.o. female. [place] presents for a check up on her medical conditions HTN, CAD, Hyperlipidemia, GERD. [place] denies new problems. Medications were reviewed with [place], she is  tolerating the medication. Bowels are regular. There has not been rectal bleeding. [place] denies urinary complications, the urine stream is good. [place] denies chest pain and denies increasing shortness of breath. Labs from 3/20 reviewed. Past Medical History:  No date: Cancer [facility])      Comment:  colon  10/02: Colon cancer ([facility] [address]  No date: Diabetes mellitus ([facility] [address]  No date: Esophageal reflux  No date: Heart disease  No date: Hypercholesterolemia  No date: Hypertension  No date: Microalbuminuria  No date: Osteoarthrosis  No date: Osteoporosis    Past Surgical History:  , 12: COLONOSCOPY  07/10/2017: COLONOSCOPY      Comment:  Dr. Familia Justice:  3 adenomatous polyps.   08: DIAGNOSTIC CARDIAC CATH LAB PROCEDURE  10/02: HEMICOLECTOMY  No date: HYSTERECTOMY  1973: KNEE SURGERY  7/10/2017: ID COLON CA SCRN NOT HI RSK IND; N/A      Comment:  COLONOSCOPY performed by Qamar Boo MD at [address] [place] [address] OR    Social History    Socioeconomic History      Marital status:       Spouse name: Not on file      Number of children: Not on file      Years of education: Not on file      Highest education level: Not on file    Occupational History        Employer: UNEMPLOYED    Social Needs      Financial resource strain: Not very hard      Food insecurity        Worry: Never true        Inability: Never true      Transportation needs        Medical: No        Non-medical: No    Tobacco Use      Smoking status: Former Smoker        Quit date: 10/11/1977        Years since quittin.9      Smokeless tobacco: Never Used    Substance and Sexual Activity      Alcohol use: No      Drug use: Not on file      Sexual activity: Not on file    Lifestyle      Physical activity        Days per NA                       139                 06/10/2020                 K                        4.5                 06/10/2020                 CL                       106                 06/10/2020                 CO2                      22                  06/10/2020                 BUN                      27 (H)              06/10/2020                 CREATININE               1.50 (H)            06/10/2020                 GLUCOSE                  111 (H)             06/10/2020                 CALCIUM                  11.1 (H)            06/10/2020                 PROT                     8.0                 06/10/2020                 LABALBU                  4.6                 06/10/2020                 BILITOT                  0.43                06/10/2020                 ALKPHOS                  105 (H)             06/10/2020                 AST                      10                  06/10/2020                 ALT                      8                   06/10/2020                 LABGLOM                  33 (L)              06/10/2020                 GFRAA                    40 (L)              06/10/2020              Lab Results       Component                Value               Date                       LABA1C                   5.7                 03/09/2020            Lab Results       Component                Value               Date                       EAG                      117                 03/09/2020              Lab Results       Component                Value               Date                       CHOL                     182                 03/09/2020                 CHOL                     185                 03/20/2019                 CHOL                     190                 03/19/2018            Lab Results       Component                Value               Date                       TRIG                     185 (H)             03/09/2020                 TRIG Murmur present. Pulmonary:      Effort: Pulmonary effort is normal.      Breath sounds: Normal breath sounds. Abdominal:      Palpations: Abdomen is soft. Tenderness: There is no abdominal tenderness. Musculoskeletal: Normal range of motion. Lymphadenopathy:      Cervical: No cervical adenopathy. Skin:     Findings: No rash. Neurological:      Mental Status: She is alert. Psychiatric:         Behavior: Behavior normal.         Thought Content: Thought content normal.         Assessment:       Diagnosis Orders   1. Controlled type 2 diabetes mellitus with stage 2 chronic kidney disease, without long-term current use of insulin ([facility])  Hemoglobin A1C   2. Malignant neoplasm of colon, unspecified part of colon ([address]     3. Vitamin D deficiency     4. Mixed hyperlipidemia     5. Gastroesophageal reflux disease without esophagitis     6. Coronary artery disease due to lipid rich plaque     7. Benign essential HTN     8. Chronic renal impairment, stage 3 (moderate) (HCC)     9. Major depressive disorder with single episode, in full remission ([address]             Plan:      1. Controlled type 2 diabetes mellitus with stage 2 chronic kidney disease, without long-term current use of insulin ([address]  Controlled on the current medication. Repeat HgbA1C in 6 months.  - Hemoglobin A1C; Future    2. Malignant neoplasm of colon, unspecified part of colon ([address]  S/P colon resection with was a curable treatment. Last colonoscopy was in 2017 with 3 adenomatous polyps. Recommended colonoscopy but [place] states that at her age she does not wish to pursue any further colonoscopies. 3. Vitamin D deficiency  Controlled on Vitamin D replacement. 4. Mixed hyperlipidemia  Controlled on statin. 5. Gastroesophageal reflux disease without esophagitis  Controlled on Prevacid. 6. Coronary artery disease due to lipid rich plaque  No chest pain or increased SOB.     7. Benign essential HTN  BP controlled on the current medication. 8. Chronic renal impairment, stage 3 (moderate) (HCC)  Stable  Avoid NSAIDS. 9. Major depressive disorder with single episode, in full remission ([facility]  Controlled on Paxil. [place] was instructed to follow up in the clinic in 6 months for check up or as needed with any medical issues.            Zhen Vargas MD

## 2020-09-28 RX ORDER — LOVASTATIN 40 MG/1
TABLET ORAL
Qty: 60 TABLET | Refills: 5 | Status: SHIPPED | OUTPATIENT
Start: 2020-09-28 | End: 2021-09-15

## 2020-09-28 NOTE — TELEPHONE ENCOUNTER
Health Maintenance   Topic Date Due    DTaP/Tdap/Td vaccine (1 - Tdap) 10/20/1950    Shingles Vaccine (1 of 2) 10/20/1981    Flu vaccine (1) 09/01/2020    Annual Wellness Visit (AWV)  11/01/2020    Lipid screen  03/09/2021    Potassium monitoring  06/10/2021    Creatinine monitoring  06/10/2021    Pneumococcal 65+ years Vaccine  Completed    Hepatitis A vaccine  Aged Out    Hib vaccine  Aged Out    Meningococcal (ACWY) vaccine  Aged Out             (applicable per patient's age: Cancer Screenings, Depression Screening, Fall Risk Screening, Immunizations)    Hemoglobin A1C (%)   Date Value   03/09/2020 5.7   03/20/2019 5.5   03/19/2018 5.7     Microalb/Crt.  Ratio (mcg/mg creat)   Date Value   08/07/2013 132     LDL Cholesterol (mg/dL)   Date Value   03/09/2020 83     AST (U/L)   Date Value   06/10/2020 10     ALT (U/L)   Date Value   06/10/2020 8     BUN (mg/dL)   Date Value   06/10/2020 27 (H)      (goal A1C is < 7)   (goal LDL is <100) need 30-50% reduction from baseline     BP Readings from Last 3 Encounters:   08/28/20 139/80   06/11/20 114/70   06/10/20 126/65    (goal /80)      All Future Testing planned in [facility]:  Lab Frequency Next Occurrence   Hemoglobin A1C Once 02/28/2021       Next Visit Date:  Future Appointments   Date Time Provider Pradeep Coello   11/16/2020  2:30 PM MD Xavier Gomez   3/2/2021 10:00 AM MD Xavier Gomez [facility]   3/8/2021 10:30 AM [facility]            Patient Active Problem List:     Vitamin D deficiency     Osteoporosis, senile     Mixed hyperlipidemia     History of colon cancer     GERD (gastroesophageal reflux disease)     CAD (coronary artery disease)     Benign essential HTN     Hypercholesterolemia     Cancer ([facility]     Osteoarthrosis     Colon cancer ([facility]     CRI (chronic renal insufficiency)     Controlled type 2 diabetes mellitus with stage 2 chronic kidney disease, without long-term current use of insulin [facility]     Major depressive disorder with single episode, in full remission ([facility]

## 2020-10-22 RX ORDER — TRIAMTERENE AND HYDROCHLOROTHIAZIDE 37.5; 25 MG/1; MG/1
CAPSULE ORAL
Qty: 30 CAPSULE | Refills: 5 | Status: SHIPPED | OUTPATIENT
Start: 2020-10-22 | End: 2021-03-25

## 2020-10-22 RX ORDER — LANSOPRAZOLE 30 MG/1
CAPSULE, DELAYED RELEASE ORAL
Qty: 30 CAPSULE | Refills: 5 | Status: SHIPPED | OUTPATIENT
Start: 2020-10-22 | End: 2021-03-25

## 2020-10-22 NOTE — TELEPHONE ENCOUNTER
Health Maintenance   Topic Date Due    DTaP/Tdap/Td vaccine (1 - Tdap) 10/20/1950    Shingles Vaccine (1 of 2) 10/20/1981    Flu vaccine (1) 09/01/2020    Annual Wellness Visit (AWV)  11/01/2020    Lipid screen  03/09/2021    Potassium monitoring  06/10/2021    Creatinine monitoring  06/10/2021    Pneumococcal 65+ years Vaccine  Completed    Hepatitis A vaccine  Aged Out    Hib vaccine  Aged Out    Meningococcal (ACWY) vaccine  Aged Out             (applicable per patient's age: Cancer Screenings, Depression Screening, Fall Risk Screening, Immunizations)    Hemoglobin A1C (%)   Date Value   03/09/2020 5.7   03/20/2019 5.5   03/19/2018 5.7     Microalb/Crt.  Ratio (mcg/mg creat)   Date Value   08/07/2013 132     LDL Cholesterol (mg/dL)   Date Value   03/09/2020 83     AST (U/L)   Date Value   06/10/2020 10     ALT (U/L)   Date Value   06/10/2020 8     BUN (mg/dL)   Date Value   06/10/2020 27 (H)      (goal A1C is < 7)   (goal LDL is <100) need 30-50% reduction from baseline     BP Readings from Last 3 Encounters:   08/28/20 139/80   06/11/20 114/70   06/10/20 126/65    (goal /80)      All Future Testing planned in [facility]:  Lab Frequency Next Occurrence   Hemoglobin A1C Once 02/28/2021       Next Visit Date:  Future Appointments   Date Time Provider Pradeep Coello   11/16/2020  2:30 PM MD Xavier Case [facility]   3/2/2021 10:00 AM MD Xavier Case [facility]   3/8/2021 10:30 AM MD Kimberly Rogers [facility]            Patient Active Problem List:     Vitamin D deficiency     Osteoporosis, senile     Mixed hyperlipidemia     History of colon cancer     GERD (gastroesophageal reflux disease)     CAD (coronary artery disease)     Benign essential HTN     Hypercholesterolemia     Cancer ([facility]     Osteoarthrosis     Colon cancer ([facility]     CRI (chronic renal insufficiency)     Controlled type 2 diabetes mellitus with stage 2 chronic kidney disease, without long-term current use of insulin [facility]     Major depressive disorder with single episode, in full remission ([facility]

## 2020-11-24 ENCOUNTER — NURSE ONLY (OUTPATIENT)
Dept: FAMILY MEDICINE CLINIC | Age: 85
End: 2020-11-24
Payer: MEDICARE

## 2020-11-24 PROCEDURE — 90686 IIV4 VACC NO PRSV 0.5 ML IM: CPT | Performed by: INTERNAL MEDICINE

## 2020-11-24 PROCEDURE — G0008 ADMIN INFLUENZA VIRUS VAC: HCPCS | Performed by: INTERNAL MEDICINE

## 2020-12-23 RX ORDER — PAROXETINE HYDROCHLORIDE 20 MG/1
TABLET, FILM COATED ORAL
Qty: 30 TABLET | Refills: 5 | Status: SHIPPED | OUTPATIENT
Start: 2020-12-23 | Stop reason: SDUPTHER

## 2020-12-23 NOTE — TELEPHONE ENCOUNTER
Health Maintenance   Topic Date Due    DTaP/Tdap/Td vaccine (1 - Tdap) 10/20/1950    Shingles Vaccine (1 of 2) 10/20/1981    Annual Wellness Visit (AWV)  05/29/2019    Lipid screen  03/09/2021    Potassium monitoring  06/10/2021    Creatinine monitoring  06/10/2021    Flu vaccine  Completed    Pneumococcal 65+ years Vaccine  Completed    Hepatitis A vaccine  Aged Out    Hib vaccine  Aged Out    Meningococcal (ACWY) vaccine  Aged Out             (applicable per patient's age: Cancer Screenings, Depression Screening, Fall Risk Screening, Immunizations)    Hemoglobin A1C (%)   Date Value   03/09/2020 5.7   03/20/2019 5.5   03/19/2018 5.7     Microalb/Crt.  Ratio (mcg/mg creat)   Date Value   08/07/2013 132     LDL Cholesterol (mg/dL)   Date Value   03/09/2020 83     AST (U/L)   Date Value   06/10/2020 10     ALT (U/L)   Date Value   06/10/2020 8     BUN (mg/dL)   Date Value   06/10/2020 27 (H)      (goal A1C is < 7)   (goal LDL is <100) need 30-50% reduction from baseline     BP Readings from Last 3 Encounters:   08/28/20 139/80   06/11/20 114/70   06/10/20 126/65    (goal /80)      All Future Testing planned in [facility]:  Lab Frequency Next Occurrence   Hemoglobin A1C Once 02/28/2021       Next Visit Date:  Future Appointments   Date Time Provider [facility]   3/2/2021 10:00 AM Terra Tejeda MD [address]   3/8/2021 10:30 AM Elías Hobbs MD Kiley Russian [facility]            Patient Active Problem List:     Vitamin D deficiency     Osteoporosis, senile     Mixed hyperlipidemia     History of colon cancer     GERD (gastroesophageal reflux disease)     CAD (coronary artery disease)     Benign essential HTN     Hypercholesterolemia     Cancer ([address]     Osteoarthrosis     Colon cancer ([address]     CRI (chronic renal insufficiency)     Controlled type 2 diabetes mellitus with stage 2 chronic kidney disease, without long-term current use of insulin ([address] Major depressive disorder with single episode, in full remission ([facility]

## 2021-02-17 DIAGNOSIS — I10 BENIGN ESSENTIAL HTN: ICD-10-CM

## 2021-02-18 RX ORDER — LISINOPRIL 10 MG/1
TABLET ORAL
Qty: 30 TABLET | Refills: 5 | Status: SHIPPED | OUTPATIENT
Start: 2021-02-18 | End: 2021-08-16

## 2021-02-18 NOTE — TELEPHONE ENCOUNTER
Health Maintenance   Topic Date Due    COVID-19 Vaccine (1 of 2) 10/20/1947    DTaP/Tdap/Td vaccine (1 - Tdap) 10/20/1950    Shingles Vaccine (1 of 2) 10/20/1981    Annual Wellness Visit (AWV)  05/29/2019    Lipid screen  03/09/2021    Potassium monitoring  06/10/2021    Creatinine monitoring  06/10/2021    Flu vaccine  Completed    Pneumococcal 65+ years Vaccine  Completed    Hepatitis A vaccine  Aged Out    Hib vaccine  Aged Out    Meningococcal (ACWY) vaccine  Aged Out             (applicable per patient's age: Cancer Screenings, Depression Screening, Fall Risk Screening, Immunizations)    Hemoglobin A1C (%)   Date Value   03/09/2020 5.7   03/20/2019 5.5   03/19/2018 5.7     Microalb/Crt.  Ratio (mcg/mg creat)   Date Value   08/07/2013 132     LDL Cholesterol (mg/dL)   Date Value   03/09/2020 83     AST (U/L)   Date Value   06/10/2020 10     ALT (U/L)   Date Value   06/10/2020 8     BUN (mg/dL)   Date Value   06/10/2020 27 (H)      (goal A1C is < 7)   (goal LDL is <100) need 30-50% reduction from baseline     BP Readings from Last 3 Encounters:   08/28/20 139/80   06/11/20 114/70   06/10/20 126/65    (goal /80)      All Future Testing planned in [facility]:  Lab Frequency Next Occurrence   Hemoglobin A1C Once 02/28/2021       Next Visit Date:  Future Appointments   Date Time Provider Pradeep Coello   3/2/2021 10:00 AM MD Xavier Marino [facility]   3/8/2021 10:30 AM MD Ramila Vicente [facility]            Patient Active Problem List:     Vitamin D deficiency     Osteoporosis, senile     Mixed hyperlipidemia     History of colon cancer     GERD (gastroesophageal reflux disease)     CAD (coronary artery disease)     Benign essential HTN     Hypercholesterolemia     Cancer ([address]     Osteoarthrosis     Colon cancer ([address]     CRI (chronic renal insufficiency)     Controlled type 2 diabetes mellitus with stage 2 chronic kidney disease, without long-term current use of insulin ([address] Major depressive disorder with single episode, in full remission ([facility]

## 2021-02-19 DIAGNOSIS — N18.2 CONTROLLED TYPE 2 DIABETES MELLITUS WITH STAGE 2 CHRONIC KIDNEY DISEASE, WITHOUT LONG-TERM CURRENT USE OF INSULIN (HCC): ICD-10-CM

## 2021-02-19 DIAGNOSIS — E11.22 CONTROLLED TYPE 2 DIABETES MELLITUS WITH STAGE 2 CHRONIC KIDNEY DISEASE, WITHOUT LONG-TERM CURRENT USE OF INSULIN (HCC): ICD-10-CM

## 2021-02-19 NOTE — TELEPHONE ENCOUNTER
Health Maintenance   Topic Date Due    COVID-19 Vaccine (1 of 2) 10/20/1947    DTaP/Tdap/Td vaccine (1 - Tdap) 10/20/1950    Shingles Vaccine (1 of 2) 10/20/1981    Annual Wellness Visit (AWV)  05/29/2019    Lipid screen  03/09/2021    Potassium monitoring  06/10/2021    Creatinine monitoring  06/10/2021    Flu vaccine  Completed    Pneumococcal 65+ years Vaccine  Completed    Hepatitis A vaccine  Aged Out    Hib vaccine  Aged Out    Meningococcal (ACWY) vaccine  Aged Out             (applicable per patient's age: Cancer Screenings, Depression Screening, Fall Risk Screening, Immunizations)    Hemoglobin A1C (%)   Date Value   03/09/2020 5.7   03/20/2019 5.5   03/19/2018 5.7     Microalb/Crt.  Ratio (mcg/mg creat)   Date Value   08/07/2013 132     LDL Cholesterol (mg/dL)   Date Value   03/09/2020 83     AST (U/L)   Date Value   06/10/2020 10     ALT (U/L)   Date Value   06/10/2020 8     BUN (mg/dL)   Date Value   06/10/2020 27 (H)      (goal A1C is < 7)   (goal LDL is <100) need 30-50% reduction from baseline     BP Readings from Last 3 Encounters:   08/28/20 139/80   06/11/20 114/70   06/10/20 126/65    (goal /80)      All Future Testing planned in [facility]:  Lab Frequency Next Occurrence   Hemoglobin A1C Once 02/28/2021       Next Visit Date:  Future Appointments   Date Time Provider [facility]   3/2/2021 10:00 AM Libby Mullins MD [facility] [address] [facility]   3/8/2021 10:30 AM MD Alysha Springer [facility]            Patient Active Problem List:     Vitamin D deficiency     Osteoporosis, senile     Mixed hyperlipidemia     History of colon cancer     GERD (gastroesophageal reflux disease)     CAD (coronary artery disease)     Benign essential HTN     Hypercholesterolemia     Cancer ([address]     Osteoarthrosis     Colon cancer ([address]     CRI (chronic renal insufficiency)     Controlled type 2 diabetes mellitus with stage 2 chronic kidney disease, without long-term current use of insulin ([address] Major depressive disorder with single episode, in full remission ([facility]

## 2021-03-02 ENCOUNTER — HOSPITAL ENCOUNTER (OUTPATIENT)
Age: 86
Discharge: HOME | End: 2021-03-02
Payer: MEDICARE

## 2021-03-02 ENCOUNTER — HOSPITAL ENCOUNTER (OUTPATIENT)
Age: 86
Discharge: HOME | End: 2021-03-04
Payer: MEDICARE

## 2021-03-02 ENCOUNTER — HOSPITAL ENCOUNTER (OUTPATIENT)
Dept: GENERAL RADIOLOGY | Age: 86
Discharge: HOME | End: 2021-03-04
Payer: MEDICARE

## 2021-03-02 DIAGNOSIS — I10 BENIGN ESSENTIAL HTN: ICD-10-CM

## 2021-03-02 DIAGNOSIS — I25.10 CORONARY ARTERY DISEASE INVOLVING NATIVE CORONARY ARTERY OF NATIVE HEART WITHOUT ANGINA PECTORIS: Primary | ICD-10-CM

## 2021-03-02 DIAGNOSIS — I25.10 CORONARY ARTERY DISEASE INVOLVING NATIVE CORONARY ARTERY OF NATIVE HEART WITHOUT ANGINA PECTORIS: ICD-10-CM

## 2021-03-02 LAB
ABSOLUTE EOS #: 0.2 K/UL (ref 0–0.4)
ABSOLUTE IMMATURE GRANULOCYTE: NORMAL K/UL (ref 0–0.3)
ABSOLUTE LYMPH #: 2.1 K/UL (ref 1–4.8)
ABSOLUTE MONO #: 0.4 K/UL (ref 0–1)
ALBUMIN SERPL-MCNC: 4.3 G/DL (ref 3.5–5.2)
ALBUMIN/GLOBULIN RATIO: ABNORMAL (ref 1–2.5)
ALP BLD-CCNC: 120 U/L (ref 35–104)
ALT SERPL-CCNC: 8 U/L (ref 5–33)
ANION GAP SERPL CALCULATED.3IONS-SCNC: 12 MMOL/L (ref 9–17)
AST SERPL-CCNC: 13 U/L
BASOPHILS # BLD: 1 % (ref 0–2)
BASOPHILS ABSOLUTE: 0 K/UL (ref 0–0.2)
BILIRUB SERPL-MCNC: 0.32 MG/DL (ref 0.3–1.2)
BUN BLDV-MCNC: 27 MG/DL (ref 8–23)
BUN/CREAT BLD: 15 (ref 9–20)
CALCIUM SERPL-MCNC: 10.5 MG/DL (ref 8.6–10.4)
CHLORIDE BLD-SCNC: 106 MMOL/L (ref 98–107)
CHOLESTEROL/HDL RATIO: 2.5
CHOLESTEROL: 170 MG/DL
CO2: 24 MMOL/L (ref 20–31)
CREAT SERPL-MCNC: 1.81 MG/DL (ref 0.5–0.9)
DIFFERENTIAL TYPE: YES
EOSINOPHILS RELATIVE PERCENT: 3 % (ref 0–5)
ESTIMATED AVERAGE GLUCOSE: 120 MG/DL
GFR AFRICAN AMERICAN: 32 ML/MIN
GFR NON-AFRICAN AMERICAN: 26 ML/MIN
GFR SERPL CREATININE-BSD FRML MDRD: ABNORMAL ML/MIN/{1.73_M2}
GFR SERPL CREATININE-BSD FRML MDRD: ABNORMAL ML/MIN/{1.73_M2}
GLUCOSE BLD-MCNC: 110 MG/DL (ref 70–99)
HBA1C MFR BLD: 5.8 % (ref 4–6)
HCT VFR BLD CALC: 37.1 % (ref 36–46)
HDLC SERPL-MCNC: 68 MG/DL
HEMOGLOBIN: 12.5 G/DL (ref 12–16)
IMMATURE GRANULOCYTES: NORMAL %
LDL CHOLESTEROL: 67 MG/DL (ref 0–130)
LYMPHOCYTES # BLD: 33 % (ref 15–40)
MAGNESIUM: 2 MG/DL (ref 1.6–2.6)
MCH RBC QN AUTO: 29.6 PG (ref 26–34)
MCHC RBC AUTO-ENTMCNC: 33.5 G/DL (ref 31–37)
MCV RBC AUTO: 88.3 FL (ref 80–100)
MONOCYTES # BLD: 6 % (ref 4–8)
NRBC AUTOMATED: NORMAL PER 100 WBC
PATIENT FASTING?: YES
PDW BLD-RTO: 14.2 % (ref 12.1–15.2)
PLATELET # BLD: 219 K/UL (ref 140–450)
PLATELET ESTIMATE: NORMAL
PMV BLD AUTO: NORMAL FL (ref 6–12)
POTASSIUM SERPL-SCNC: 4.6 MMOL/L (ref 3.7–5.3)
RBC # BLD: 4.21 M/UL (ref 4–5.2)
RBC # BLD: NORMAL 10*6/UL
SEG NEUTROPHILS: 57 % (ref 47–75)
SEGMENTED NEUTROPHILS ABSOLUTE COUNT: 3.5 K/UL (ref 2.5–7)
SODIUM BLD-SCNC: 142 MMOL/L (ref 135–144)
TOTAL PROTEIN: 7.1 G/DL (ref 6.4–8.3)
TRIGL SERPL-MCNC: 174 MG/DL
TSH SERPL DL<=0.05 MIU/L-ACNC: 1.37 MIU/L (ref 0.3–5)
VITAMIN D 25-HYDROXY: 45.5 NG/ML (ref 30–100)
VLDLC SERPL CALC-MCNC: ABNORMAL MG/DL (ref 1–30)
WBC # BLD: 6.2 K/UL (ref 3.5–11)
WBC # BLD: NORMAL 10*3/UL

## 2021-03-02 PROCEDURE — 80061 LIPID PANEL: CPT

## 2021-03-02 PROCEDURE — 83036 HEMOGLOBIN GLYCOSYLATED A1C: CPT

## 2021-03-02 PROCEDURE — 93005 ELECTROCARDIOGRAM TRACING: CPT | Performed by: INTERNAL MEDICINE

## 2021-03-02 PROCEDURE — 36415 COLL VENOUS BLD VENIPUNCTURE: CPT

## 2021-03-02 PROCEDURE — 85025 COMPLETE CBC W/AUTO DIFF WBC: CPT

## 2021-03-02 PROCEDURE — 71046 X-RAY EXAM CHEST 2 VIEWS: CPT

## 2021-03-02 PROCEDURE — 84443 ASSAY THYROID STIM HORMONE: CPT

## 2021-03-02 PROCEDURE — 82306 VITAMIN D 25 HYDROXY: CPT

## 2021-03-02 PROCEDURE — 80053 COMPREHEN METABOLIC PANEL: CPT

## 2021-03-02 PROCEDURE — 83735 ASSAY OF MAGNESIUM: CPT

## 2021-03-03 LAB
EKG ATRIAL RATE: 67 BPM
EKG P AXIS: 28 DEGREES
EKG P-R INTERVAL: 172 MS
EKG Q-T INTERVAL: 336 MS
EKG QRS DURATION: 64 MS
EKG QTC CALCULATION (BAZETT): 355 MS
EKG R AXIS: 66 DEGREES
EKG T AXIS: 64 DEGREES
EKG VENTRICULAR RATE: 67 BPM

## 2021-03-03 PROCEDURE — 93010 ELECTROCARDIOGRAM REPORT: CPT | Performed by: INTERNAL MEDICINE

## 2021-03-08 ENCOUNTER — OFFICE VISIT (OUTPATIENT)
Dept: CARDIOLOGY CLINIC | Age: 86
End: 2021-03-08
Payer: MEDICARE

## 2021-03-08 VITALS
DIASTOLIC BLOOD PRESSURE: 80 MMHG | BODY MASS INDEX: 28.32 KG/M2 | WEIGHT: 165 LBS | HEART RATE: 65 BPM | OXYGEN SATURATION: 97 % | SYSTOLIC BLOOD PRESSURE: 160 MMHG

## 2021-03-08 DIAGNOSIS — I10 BENIGN ESSENTIAL HTN: ICD-10-CM

## 2021-03-08 DIAGNOSIS — E55.9 VITAMIN D DEFICIENCY: ICD-10-CM

## 2021-03-08 DIAGNOSIS — I25.10 CORONARY ARTERY DISEASE INVOLVING NATIVE CORONARY ARTERY OF NATIVE HEART WITHOUT ANGINA PECTORIS: Primary | ICD-10-CM

## 2021-03-08 DIAGNOSIS — E11.22 CONTROLLED TYPE 2 DIABETES MELLITUS WITH STAGE 2 CHRONIC KIDNEY DISEASE, WITHOUT LONG-TERM CURRENT USE OF INSULIN (HCC): ICD-10-CM

## 2021-03-08 DIAGNOSIS — N18.2 CONTROLLED TYPE 2 DIABETES MELLITUS WITH STAGE 2 CHRONIC KIDNEY DISEASE, WITHOUT LONG-TERM CURRENT USE OF INSULIN (HCC): ICD-10-CM

## 2021-03-08 DIAGNOSIS — E78.2 MIXED HYPERLIPIDEMIA: ICD-10-CM

## 2021-03-08 PROCEDURE — 1123F ACP DISCUSS/DSCN MKR DOCD: CPT | Performed by: INTERNAL MEDICINE

## 2021-03-08 PROCEDURE — 1090F PRES/ABSN URINE INCON ASSESS: CPT | Performed by: INTERNAL MEDICINE

## 2021-03-08 PROCEDURE — 1036F TOBACCO NON-USER: CPT | Performed by: INTERNAL MEDICINE

## 2021-03-08 PROCEDURE — G8417 CALC BMI ABV UP PARAM F/U: HCPCS | Performed by: INTERNAL MEDICINE

## 2021-03-08 PROCEDURE — 99213 OFFICE O/P EST LOW 20 MIN: CPT | Performed by: INTERNAL MEDICINE

## 2021-03-08 PROCEDURE — G8482 FLU IMMUNIZE ORDER/ADMIN: HCPCS | Performed by: INTERNAL MEDICINE

## 2021-03-08 PROCEDURE — 4040F PNEUMOC VAC/ADMIN/RCVD: CPT | Performed by: INTERNAL MEDICINE

## 2021-03-08 PROCEDURE — G8427 DOCREV CUR MEDS BY ELIG CLIN: HCPCS | Performed by: INTERNAL MEDICINE

## 2021-03-08 NOTE — PROGRESS NOTES
Ov Dr. Reinier Sam for one year follow up   Pt verbalized medications   No chest pain   No sob   No dizziness  No hospitalizations/procedures/er visits      Will get NON FASTING LAB in 2-3 weeks to make sure kidneys are better    Follow up in one year

## 2021-03-08 NOTE — LETTER
Angy Higgins M.D. [address] [place] [address] [facility]   [phone]        2021        Mary Jensen MD  [address] [place] [address]    RE:   Manju Marte  :  10/20/1931    Dear Dr. Romana Longo:    CHIEF COMPLAINT:  1. Coronary artery disease. 2.  Non-insulin-dependent diabetes, under excellent control, hemoglobin A1c at 5.8.  3.  Chronic renal insufficiency. HISTORY OF PRESENT ILLNESS:  I had the pleasure of seeing Mrs. Claudia Joseph in the office on 2021. She is a pleasant 79-year-old female, who had a catheterization on 2008, that showed mild plaque disease. She has chronic renal insufficiency with a creatinine usually in the 1.6 to 1.8 range. She stays home with her  who can be somewhat trying. She remains active. She has had no hospitalizations or procedures. She denies any chest pain or chest discomfort. No unusual shortness of breath. No PND, orthopnea, or pedal edema. She really has no complaints as I see her today. CARDIAC RISK FACTORS:  Hypertension:  Positive. Hyperlipidemia:  Positive. Diabetes:  Positive. Peripheral Vascular Disease:  Negative. Smoking:  Negative. Other Family Members:  Negative. MEDICATIONS AT HOME:  She is currently on aspirin 81 mg daily, Prevacid 30 mg daily, Tradjenta 5 mg daily, lisinopril 10 mg daily, Mevacor 40 mg daily, Paxil 20 mg daily, Dyazide 37.5/25 daily. PAST MEDICAL AND SURGICAL HISTORY:  1.  Non-insulin-dependent diabetes, under excellent control with her hemoglobin A1c at 5.8. 2.  Colon cancer in 10/2002, with hemicolectomy, no reoccurrence. 3.  Left knee surgery in .  4.  Very labile hypertension, usually elevated in our office. 5.  Hyperlipidemia, under excellent control. 6.  Chronic renal insufficiency. 7.  Bilateral knee surgery. FAMILY HISTORY:  Negative for CAD.     SOCIAL HISTORY:  She is 80years old, three children 1.37.  Vitamin D 45.5. White count 6.2, hemoglobin 12.5 with a platelet count 765,889. EKG showed normal sinus rhythm with mild nonspecific ST changes. Her chest x-ray was unremarkable. IMPRESSION:  1. Hypertension, very labile and usually elevated in our office (although usually higher in Dr. Jyothi Grullon office because she \"finds him very irritating. .. \"). 2.  Non-insulin-dependent diabetes, under excellent control with hemoglobin A1c at 5.8. 3.  Hyperlipidemia, under good control. 4.  Catheterization on 02/20/2008, after an abnormal stress test that showed mild plaque disease with normal LV function. 5.  Chronic renal insufficiency with a creatinine about baseline at 1.8.  6. Hemicolectomy secondary to colon cancer in 2002. PLAN:  1. We will do a nonfasting Chem-7 in 2 to 3 weeks to make sure renal function is stable. 2.  We will see in 1 year unless a problem would develop. DISCUSSION:  Mrs. Roslyn Valera overall is doing very well. She has had no chest pain or chest discomfort or any usual shortness of breath. She has no symptoms to indicate we need to do any testing. I have made no change in her medications. I will plan on seeing her in 1 year. She obviously is very grateful to Dr. Danial Brewster and I had to listen to her singing his praises for at least 17 minutes, until I got up and abruptly left the room. .. Thank you very much for allowing me the privilege of seeing Mrs. Roslyn Valera. If you have any questions on my thoughts, please do not hesitate to contact me.     Sincerely,        Elly Hooper    D: 03/08/2021 10:50:18     T: 03/08/2021 12:28:22     [email or web address]  Job#: [id number]   Doc#: [id number]

## 2021-03-09 NOTE — PROGRESS NOTES
Sammy Aguila M.D. [address] [place], [place] [address]   [phone]        2021        Libby Mullins MD  [address] [place] [address]    RE:   Demond Randle  :  10/20/1931    Dear Dr. Venice Merino:    CHIEF COMPLAINT:  1. Coronary artery disease. 2.  Non-insulin-dependent diabetes, under excellent control, hemoglobin A1c at 5.8.  3.  Chronic renal insufficiency. HISTORY OF PRESENT ILLNESS:  I had the pleasure of seeing Mrs. Avel Angelucci in the office on 2021. She is a pleasant 61-year-old female, who had a catheterization on 2008, that showed mild plaque disease. She has chronic renal insufficiency with a creatinine usually in the 1.6 to 1.8 range. She stays home with her  who can be somewhat trying. She remains active. She has had no hospitalizations or procedures. She denies any chest pain or chest discomfort. No unusual shortness of breath. No PND, orthopnea, or pedal edema. She really has no complaints as I see her today. CARDIAC RISK FACTORS:  Hypertension:  Positive. Hyperlipidemia:  Positive. Diabetes:  Positive. Peripheral Vascular Disease:  Negative. Smoking:  Negative. Other Family Members:  Negative. MEDICATIONS AT HOME:  She is currently on aspirin 81 mg daily, Prevacid 30 mg daily, Tradjenta 5 mg daily, lisinopril 10 mg daily, Mevacor 40 mg daily, Paxil 20 mg daily, Dyazide 37.5/25 daily. PAST MEDICAL AND SURGICAL HISTORY:  1.  Non-insulin-dependent diabetes, under excellent control with her hemoglobin A1c at 5.8. 2.  Colon cancer in 10/2002, with hemicolectomy, no reoccurrence. 3.  Left knee surgery in .  4.  Very labile hypertension, usually elevated in our office. 5.  Hyperlipidemia, under excellent control. 6.  Chronic renal insufficiency. 7.  Bilateral knee surgery. FAMILY HISTORY:  Negative for CAD.     SOCIAL HISTORY:  She is 80years old, three children and three stepchildren in [place]. Does not exercise. Does not smoke or drink alcohol. Stays with her . REVIEW OF SYSTEMS:  Cardiac as above. Other systems reviewed including constitutional, eyes, ears, nose and throat, cardiovascular, respiratory, GI, , musculoskeletal, integumentary, neurologic, endocrine, hematologic and allergic/immunologic are negative except for what is described above. No weight loss or weight gain. No change in bowel habits. No blood in stool. No fevers, sweats or chills. PHYSICAL EXAMINATION:  VITAL SIGNS:  Her blood pressure was 160/80 with a heart rate of 65 and regular. Respirations were 18. O2 saturation 97%. Weight 165 pounds. GENERAL:  She is a pleasant 66-year-old female. Denied pain. She was oriented to person, place and time. Answered questions appropriately. SKIN:  No unusual skin changes. HEENT:  The pupils are equally round and intact. Mucous membranes were dry. NECK:  No JVD. Good carotid pulses. No carotid bruits. No lymphadenopathy or thyromegaly. CARDIOVASCULAR EXAM:  S1 and S2 were normal.  No S3 or S4. Soft systolic blowing type murmur. No diastolic murmur. PMI was normal.  No lift, thrust, or pericardial friction rub. LUNGS:  Quite clear to auscultation and percussion. ABDOMEN:  Soft and nontender. Good bowel sounds. EXTREMITIES:  Good femoral pulses. Good pedal pulses. No pedal edema. Skin was warm and dry. No calf tenderness. Nail beds pink. Good cap refill. PULSES:  Bilateral symmetrical radial, brachial and carotid pulses. No carotid bruits. Good femoral and pedal pulses. NEUROLOGIC EXAM:  Within normal limits. PSYCHIATRIC EXAM:  Within normal limits. LABORATORY DATA:  From 03/02/2021, sodium 141, potassium 4.6, BUN 27, creatinine 1.81, GFR was 26. Magnesium 2.0, glucose 110, calcium was 10.5. Cholesterol 170 with an HDL 68, LDL 67, triglycerides 174. ALT was 8, AST was 13. Hemoglobin A1c was 5.8.   TSH 1.37.  Vitamin D 45.5. White count 6.2, hemoglobin 12.5 with a platelet count 029,454. EKG showed normal sinus rhythm with mild nonspecific ST changes. Her chest x-ray was unremarkable. IMPRESSION:  1. Hypertension, very labile and usually elevated in our office (although usually higher in Dr. Alexsander Lawton office because she \"finds him very irritating. .. \"). 2.  Non-insulin-dependent diabetes, under excellent control with hemoglobin A1c at 5.8. 3.  Hyperlipidemia, under good control. 4.  Catheterization on 02/20/2008, after an abnormal stress test that showed mild plaque disease with normal LV function. 5.  Chronic renal insufficiency with a creatinine about baseline at 1.8.  6. Hemicolectomy secondary to colon cancer in 2002. PLAN:  1. We will do a nonfasting Chem-7 in 2 to 3 weeks to make sure renal function is stable. 2.  We will see in 1 year unless a problem would develop. DISCUSSION:  Mrs. Avel Angelucci overall is doing very well. She has had no chest pain or chest discomfort or any usual shortness of breath. She has no symptoms to indicate we need to do any testing. I have made no change in her medications. I will plan on seeing her in 1 year. She obviously is very grateful to Dr. Venice Merino and I had to listen to her singing his praises for at least 17 minutes. .. Thank you very much for allowing me the privilege of seeing Mrs. Avel Angelucci. If you have any questions on my thoughts, please do not hesitate to contact me.     Sincerely,        Carlos Blake    D: 03/08/2021 10:50:18     T: 03/08/2021 12:28:22     [email or web address]  Job#: [id number]   Doc#: [id number]

## 2021-03-16 ENCOUNTER — OFFICE VISIT (OUTPATIENT)
Dept: FAMILY MEDICINE CLINIC | Age: 86
End: 2021-03-16
Payer: MEDICARE

## 2021-03-16 VITALS
SYSTOLIC BLOOD PRESSURE: 136 MMHG | HEART RATE: 78 BPM | WEIGHT: 163 LBS | DIASTOLIC BLOOD PRESSURE: 78 MMHG | TEMPERATURE: 96.9 F | BODY MASS INDEX: 27.83 KG/M2 | HEIGHT: 64 IN

## 2021-03-16 DIAGNOSIS — E78.2 MIXED HYPERCHOLESTEROLEMIA AND HYPERTRIGLYCERIDEMIA: ICD-10-CM

## 2021-03-16 DIAGNOSIS — N18.32 CHRONIC RENAL IMPAIRMENT, STAGE 3B (HCC): ICD-10-CM

## 2021-03-16 DIAGNOSIS — F32.5 MAJOR DEPRESSIVE DISORDER WITH SINGLE EPISODE, IN FULL REMISSION (HCC): ICD-10-CM

## 2021-03-16 DIAGNOSIS — I25.10 CORONARY ARTERY DISEASE INVOLVING NATIVE CORONARY ARTERY OF NATIVE HEART WITHOUT ANGINA PECTORIS: ICD-10-CM

## 2021-03-16 DIAGNOSIS — E11.22 CONTROLLED TYPE 2 DIABETES MELLITUS WITH STAGE 2 CHRONIC KIDNEY DISEASE, WITHOUT LONG-TERM CURRENT USE OF INSULIN (HCC): Primary | ICD-10-CM

## 2021-03-16 DIAGNOSIS — N18.2 CONTROLLED TYPE 2 DIABETES MELLITUS WITH STAGE 2 CHRONIC KIDNEY DISEASE, WITHOUT LONG-TERM CURRENT USE OF INSULIN (HCC): Primary | ICD-10-CM

## 2021-03-16 DIAGNOSIS — C18.9 MALIGNANT NEOPLASM OF COLON, UNSPECIFIED PART OF COLON (HCC): ICD-10-CM

## 2021-03-16 DIAGNOSIS — K21.9 GASTROESOPHAGEAL REFLUX DISEASE WITHOUT ESOPHAGITIS: ICD-10-CM

## 2021-03-16 DIAGNOSIS — E55.9 VITAMIN D DEFICIENCY: ICD-10-CM

## 2021-03-16 PROCEDURE — G8482 FLU IMMUNIZE ORDER/ADMIN: HCPCS | Performed by: INTERNAL MEDICINE

## 2021-03-16 PROCEDURE — G8417 CALC BMI ABV UP PARAM F/U: HCPCS | Performed by: INTERNAL MEDICINE

## 2021-03-16 PROCEDURE — 1036F TOBACCO NON-USER: CPT | Performed by: INTERNAL MEDICINE

## 2021-03-16 PROCEDURE — G8427 DOCREV CUR MEDS BY ELIG CLIN: HCPCS | Performed by: INTERNAL MEDICINE

## 2021-03-16 PROCEDURE — 99214 OFFICE O/P EST MOD 30 MIN: CPT | Performed by: INTERNAL MEDICINE

## 2021-03-16 PROCEDURE — 1090F PRES/ABSN URINE INCON ASSESS: CPT | Performed by: INTERNAL MEDICINE

## 2021-03-16 PROCEDURE — 4040F PNEUMOC VAC/ADMIN/RCVD: CPT | Performed by: INTERNAL MEDICINE

## 2021-03-16 PROCEDURE — 1123F ACP DISCUSS/DSCN MKR DOCD: CPT | Performed by: INTERNAL MEDICINE

## 2021-03-16 ASSESSMENT — ENCOUNTER SYMPTOMS
BLOOD IN STOOL: 0
CHEST TIGHTNESS: 0
NAUSEA: 0
CONSTIPATION: 0
SHORTNESS OF BREATH: 0
RHINORRHEA: 0
DIARRHEA: 0
COUGH: 0
SORE THROAT: 0
ABDOMINAL PAIN: 0

## 2021-03-16 NOTE — PROGRESS NOTES
[address] (:  10/20/1931) is a 80 y.o. female,Established patient, here for evaluation of the following chief complaint(s):  Discuss Labs, Diabetes, Hypertension, Hyperlipidemia, Gastroesophageal Reflux, Mental Health Problem, Arthritis, and Coronary Artery Disease      ASSESSMENT/PLAN:  1. Controlled type 2 diabetes mellitus with stage 2 chronic kidney disease, without long-term current use of insulin ([address]  2. Major depressive disorder with single episode, in full remission ([address]  3. Malignant neoplasm of colon, unspecified part of colon ([address]  4. Vitamin D deficiency  5. Chronic renal impairment, stage 3b  6. Mixed hypercholesterolemia and hypertriglyceridemia  7. Gastroesophageal reflux disease without esophagitis  8. Coronary artery disease involving native coronary artery of native heart without angina pectoris    Plan:  1. Major depressive disorder with single episode, in full remission ([address]  Well controlled on Paxil 20 mg daily. 2. Malignant neoplasm of colon, unspecified part of colon ([address]  S/P colon resection. [place] does not want to continue with routine Colonoscopy surveillance. 3. Controlled type 2 diabetes mellitus with stage 2 chronic kidney disease, without long-term current use of insulin (HCC)  Controlled on Januvia. 4. Vitamin D deficiency  Controlled on Vitamin D replacement. 5. Chronic renal impairment, stage 3b  Creatinine slightly higher on her last labs. Avoid all NSAIDS. 6. Mixed hypercholesterolemia and hypertriglyceridemia  Controlled on statin. Labs followed through Dr. Harriett Zendejas. 7. Gastroesophageal reflux disease without esophagitis  Controlled on Prevacid. 8. Coronary artery disease involving native coronary artery of native heart without angina pectoris  Followed by Dr. Harriett Zendejas. NO chest pain or increase in SOB. [place] was instructed to follow up in the clinic in 6 months for check up or as needed with any medical issues. SUBJECTIVE/OBJECTIVE:  [place] presents for a check up on her medical conditions DM II, HTN, Hyperlipidemia, GERD, CAD, OA. [place] denies new problems. Medications were reviewed with [place], she is  tolerating the medication. Bowels are regular. There has not been rectal bleeding. [place] denies urinary complications, the urine stream is good. [place] denies chest pain and denies increasing shortness of breath. Labs from 3/21 reviewed. Depression / anxiety is well controlled on Paxil. Past Medical History:  No date: Cancer [facility])      Comment:  colon  10/02: Colon cancer ([facility]  No date: Diabetes mellitus ([facility]  No date: Esophageal reflux  No date: Heart disease  No date: Hypercholesterolemia  No date: Hypertension  No date: Microalbuminuria  No date: Osteoarthrosis  No date: Osteoporosis    Past Surgical History:  , 12: COLONOSCOPY  07/10/2017: COLONOSCOPY      Comment:  Dr. Sepulveda Lights:  3 adenomatous polyps.   08: DIAGNOSTIC CARDIAC CATH LAB PROCEDURE  10/02: HEMICOLECTOMY  No date: HYSTERECTOMY  1973: KNEE SURGERY  7/10/2017: OR COLON CA SCRN NOT HI RSK IND; N/A      Comment:  COLONOSCOPY performed by [facility]ren Robb MD at [facility] OR    Social History    Socioeconomic History      Marital status:       Spouse name: Not on file      Number of children: Not on file      Years of education: Not on file      Highest education level: Not on file    Occupational History        Employer: UNEMPLOYED    Social Needs      Financial resource strain: Not very hard      Food insecurity        Worry: Never true        Inability: Never true      Transportation needs        Medical: No        Non-medical: No    Tobacco Use      Smoking status: Former Smoker        Quit date: 10/11/1977        Years since quittin.4      Smokeless tobacco: Never Used    Substance and Sexual Activity      Alcohol use: No      Drug use: Not on file      Sexual activity: Not on file    Lifestyle      Physical activity Days per week: Not on file        Minutes per session: Not on file      Stress: Not on file    Relationships      Social connections        Talks on phone: Not on file        Gets together: Not on file        Attends Evangelical service: Not on file        Active member of club or organization: Not on file        Attends meetings of clubs or organizations: Not on file        Relationship status: Not on file      Intimate partner violence        Fear of current or ex partner: Not on file        Emotionally abused: Not on file        Physically abused: Not on file        Forced sexual activity: Not on file    Other Topics      Concerns:        Not on file    Social History Narrative      Not on file      Review of patient's family history indicates:  Problem: Heart Attack      Relation: Brother          Age of Onset: (Not Specified)      Current Outpatient Medications on File Prior to Visit:  linagliptin (TRADJENTA) 5 MG tablet, Take 1 tablet by mouth daily, Disp: 30 tablet, Rfl: 1  lisinopril (PRINIVIL;ZESTRIL) 10 MG tablet, TAKE 1 TABLET BY MOUTH EVERY DAY, Disp: 30 tablet, Rfl: 5  PARoxetine (PAXIL) 20 MG tablet, TAKE 1 TABLET BY MOUTH EVERY DAY IN THE MORNING, Disp: 30 tablet, Rfl: 5  lansoprazole (PREVACID) 30 MG delayed release capsule, TAKE 1 CAPSULE BY MOUTH EVERY DAY, Disp: 30 capsule, Rfl: 5  triamterene-hydroCHLOROthiazide (DYAZIDE) 37.5-25 MG per capsule, TAKE 1 CAPSULE BY MOUTH EVERY DAY, Disp: 30 capsule, Rfl: 5  lovastatin (MEVACOR) 40 MG tablet, TAKE 1 TABLET BY MOUTH NIGHTLY, Disp: 60 tablet, Rfl: 5  ondansetron (ZOFRAN ODT) 4 MG disintegrating tablet, Take 1 tablet by mouth every 8 hours as needed for Nausea or Vomiting, Disp: 10 tablet, Rfl: 0  FIBER PO, Take 1 tablet by mouth daily, Disp: , Rfl:   aspirin 81 MG tablet, Take 81 mg by mouth 2 times daily , Disp: , Rfl:   Cholecalciferol (VITAMIN D) 2000 UNITS CAPS capsule, Take 1 capsule by mouth daily. , Disp: 30 capsule, Rfl: 11    No current facility-administered medications on file prior to visit.        No Known Allergies      Lab Results       Component                Value               Date                       NA                       142                 03/02/2021                 K                        4.6                 03/02/2021                 CL                       106                 03/02/2021                 CO2                      24                  03/02/2021                 BUN                      27 (H)              03/02/2021                 CREATININE               1.81 (H)            03/02/2021                 GLUCOSE                  110 (H)             03/02/2021                 CALCIUM                  10.5 (H)            03/02/2021                 PROT                     7.1                 03/02/2021                 LABALBU                  4.3                 03/02/2021                 BILITOT                  0.32                03/02/2021                 ALKPHOS                  120 (H)             03/02/2021                 AST                      13                  03/02/2021                 ALT                      8                   03/02/2021                 LABGLOM                  26 (L)              03/02/2021                 GFRAA                    32 (L)              03/02/2021              Lab Results       Component                Value               Date                       LABA1C                   5.8                 03/02/2021            Lab Results       Component                Value               Date                       EAG                      120                 03/02/2021              Lab Results       Component                Value               Date                       CHOL                     170                 03/02/2021                 CHOL                     182                 03/09/2020                 CHOL                     185                 03/20/2019            Lab Results       Component                Value               Date                       TRIG                     174 (H)             03/02/2021                 TRIG                     185 (H)             03/09/2020                 TRIG                     211 (H)             03/20/2019            Lab Results       Component                Value               Date                       HDL                      68                  03/02/2021                 HDL                      62                  03/09/2020                 HDL                      63                  03/20/2019            Lab Results       Component                Value               Date                       LDLCHOLESTEROL           67                  03/02/2021                 LDLCHOLESTEROL           83                  03/09/2020                 LDLCHOLESTEROL           80                  03/20/2019            Lab Results       Component                Value               Date                       VLDL                                         03/02/2021             NOT REPORTED (H)       VLDL                                         03/09/2020             NOT REPORTED (H)       VLDL                                         03/20/2019             NOT REPORTED (H)  Lab Results       Component                Value               Date                       CHOLHDLRATIO             2.5                 03/02/2021                 CHOLHDLRATIO             2.9                 03/09/2020                 CHOLHDLRATIO             2.9                 03/20/2019                              Diabetes  She presents for her follow-up diabetic visit. She has type 2 diabetes mellitus. Her disease course has been stable. There are no hypoglycemic associated symptoms. Pertinent negatives for diabetes include no chest pain. There are no hypoglycemic complications. Symptoms are stable. Current diabetic treatment includes oral agent (monotherapy).  She is compliant with treatment all of the time. Her weight is stable. She is following a diabetic diet. She never participates in exercise. There is no change in her home blood glucose trend. An ACE inhibitor/angiotensin II receptor blocker is being taken. Hypertension  This is a chronic problem. The current episode started more than 1 year ago. The problem is unchanged. The problem is controlled. Pertinent negatives include no chest pain, neck pain, palpitations or shortness of breath. Past treatments include ACE inhibitors and diuretics. The current treatment provides significant improvement. There are no compliance problems. There is no history of angina. Hyperlipidemia  This is a chronic problem. The current episode started more than 1 year ago. The problem is controlled. Recent lipid tests were reviewed and are normal. Pertinent negatives include no chest pain, myalgias or shortness of breath. Current antihyperlipidemic treatment includes statins. The current treatment provides significant improvement of lipids. There are no compliance problems. Gastroesophageal Reflux  She reports no abdominal pain, no chest pain, no coughing, no nausea or no sore throat. This is a chronic problem. The current episode started more than 1 year ago. The problem occurs rarely. The problem has been unchanged. She has tried a PPI for the symptoms. The treatment provided significant relief. Coronary Artery Disease  Presents for follow-up visit. Pertinent negatives include no chest pain, chest tightness, palpitations or shortness of breath. Risk factors include hyperlipidemia. The symptoms have been stable. Compliance with diet is good. Compliance with exercise is poor. Compliance with medications is good. Review of Systems   Constitutional: Negative. HENT: Negative for congestion, ear pain, rhinorrhea, sneezing and sore throat. Eyes: Negative for visual disturbance.    Respiratory: Negative for cough, chest tightness and shortness of breath. Cardiovascular: Negative for chest pain and palpitations. Gastrointestinal: Negative for abdominal pain, blood in stool, constipation, diarrhea and nausea. Genitourinary: Negative for difficulty urinating, dysuria, frequency, menstrual problem and urgency. Musculoskeletal: Negative for arthralgias, joint swelling, myalgias and neck pain. Skin: Negative. Neurological: Negative for syncope. Psychiatric/Behavioral: Negative. Physical Exam  Constitutional:       Appearance: She is well-developed. HENT:      Head: Atraumatic. Eyes:      Conjunctiva/sclera: Conjunctivae normal.   Neck:      Musculoskeletal: Normal range of motion and neck supple. Cardiovascular:      Rate and Rhythm: Normal rate and regular rhythm. Heart sounds: Murmur present. Pulmonary:      Effort: Pulmonary effort is normal.      Breath sounds: Normal breath sounds. Abdominal:      Palpations: Abdomen is soft. Tenderness: There is no abdominal tenderness. Musculoskeletal: Normal range of motion. Lymphadenopathy:      Cervical: No cervical adenopathy. Skin:     Findings: No rash. Neurological:      Mental Status: She is alert. Psychiatric:         Behavior: Behavior normal.         Thought Content: Thought content normal.                 An electronic signature was used to authenticate this note.     --Lance Mcnair MD

## 2021-03-16 NOTE — PATIENT INSTRUCTIONS
1. Major depressive disorder with single episode, in full remission ([facility]  Well controlled on Paxil 20 mg daily. 2. Malignant neoplasm of colon, unspecified part of colon ([facility]  S/P colon resection. [place] does not want to continue with routine Colonoscopy surveillance. 3. Controlled type 2 diabetes mellitus with stage 2 chronic kidney disease, without long-term current use of insulin (HCC)  Controlled on Januvia. 4. Vitamin D deficiency  Controlled on Vitamin D replacement. 5. Chronic renal impairment, stage 3b  Creatinine slightly higher on her last labs. Avoid all NSAIDS. 6. Mixed hypercholesterolemia and hypertriglyceridemia  Controlled on statin. Labs followed through Dr. Sherin Burnette. 7. Gastroesophageal reflux disease without esophagitis  Controlled on Prevacid. 8. Coronary artery disease involving native coronary artery of native heart without angina pectoris  Followed by Dr. Sherin Burnette. NO chest pain or increase in SOB. [place] was instructed to follow up in the clinic in 6 months for check up or as needed with any medical issues.

## 2021-03-18 ENCOUNTER — OFFICE VISIT (OUTPATIENT)
Dept: FAMILY MEDICINE CLINIC | Age: 86
End: 2021-03-18
Payer: MEDICARE

## 2021-03-18 VITALS
WEIGHT: 164 LBS | HEIGHT: 64 IN | BODY MASS INDEX: 28 KG/M2 | SYSTOLIC BLOOD PRESSURE: 136 MMHG | HEART RATE: 78 BPM | DIASTOLIC BLOOD PRESSURE: 80 MMHG

## 2021-03-18 DIAGNOSIS — Z78.9 FULL CODE STATUS: ICD-10-CM

## 2021-03-18 DIAGNOSIS — Z00.00 ROUTINE GENERAL MEDICAL EXAMINATION AT A HEALTH CARE FACILITY: Primary | ICD-10-CM

## 2021-03-18 PROCEDURE — 1123F ACP DISCUSS/DSCN MKR DOCD: CPT | Performed by: INTERNAL MEDICINE

## 2021-03-18 PROCEDURE — 4040F PNEUMOC VAC/ADMIN/RCVD: CPT | Performed by: INTERNAL MEDICINE

## 2021-03-18 PROCEDURE — G0439 PPPS, SUBSEQ VISIT: HCPCS | Performed by: INTERNAL MEDICINE

## 2021-03-18 PROCEDURE — G8482 FLU IMMUNIZE ORDER/ADMIN: HCPCS | Performed by: INTERNAL MEDICINE

## 2021-03-18 ASSESSMENT — PATIENT HEALTH QUESTIONNAIRE - PHQ9
2. FEELING DOWN, DEPRESSED OR HOPELESS: 0
SUM OF ALL RESPONSES TO PHQ9 QUESTIONS 1 & 2: 0
1. LITTLE INTEREST OR PLEASURE IN DOING THINGS: 0
SUM OF ALL RESPONSES TO PHQ QUESTIONS 1-9: 0

## 2021-03-18 NOTE — PATIENT INSTRUCTIONS
Survey: You may be receiving a survey from [email or web address] regarding your visit today. You may get this in the mail, through your MyChart or in your email. Please complete the survey to enable us to provide the highest quality of care to you and your family. Please also, mention our names. If you cannot score us as very good (5 Stars) on any question, please feel free to call the office to discuss how we could have made your experience exceptional.      Thank You! MD Efrem Polanco, Tyesha Ledesma, LIZETN RN    [facility], [address] [place] [address]      COVID-19 vaccine appointments are not available through our practice. As you're eligible to receive the COVID-19 vaccine, as determined by your state's department of health, you will be able to schedule an appointment. Appointments are required to receive the COVID-19 vaccine. As vaccine supply continues to be limited, we anticipate open appointments to fill up quickly and appreciate your patience as we work through the process of providing vaccines to those in our communities. [address] COVID-19 VACCINE PROVIDERS  Please check with the contacts below for Covid-19 vaccination availability and adminstration    NICOLE DIA  [phone]    Missy Warner  [phone]    MALINI DIA  [email or web address]    [facility]   [email or web address]      Or [phone]. [address]  [phone]. Or MyChart    [facility].    [facility]   visit [email or web address]    for location availability      Personalized Preventive Plan for [place] - 3/18/2021  Medicare offers a range of preventive health benefits. Some of the tests and screenings are paid in full while other may be subject to a deductible, co-insurance, and/or copay.     Some of these benefits include a comprehensive review of your medical history including lifestyle, illnesses that may run in your family, and various assessments and screenings as appropriate. After reviewing your medical record and screening and assessments performed today your provider may have ordered immunizations, labs, imaging, and/or referrals for you. A list of these orders (if applicable) as well as your Preventive Care list are included within your After Visit Summary for your review. Other Preventive Recommendations:    · A preventive eye exam performed by an eye specialist is recommended every 1-2 years to screen for glaucoma; cataracts, macular degeneration, and other eye disorders. · A preventive dental visit is recommended every 6 months. · Try to get at least 150 minutes of exercise per week or 10,000 steps per day on a pedometer . · Order or download the FREE \"Exercise & Physical Activity: Your Everyday Guide\" from The [employer]. Call 9-365.364.7313 or search The [employer] Aging online. · You need 8148-8119 mg of calcium and 2984-6020 IU of vitamin D per day. It is possible to meet your calcium requirement with diet alone, but a vitamin D supplement is usually necessary to meet this goal.  · When exposed to the sun, use a sunscreen that protects against both UVA and UVB radiation with an SPF of 30 or greater. Reapply every 2 to 3 hours or after sweating, drying off with a towel, or swimming. · Always wear a seat belt when traveling in a car. Always wear a helmet when riding a bicycle or motorcycle. Personalized Preventive Plan for [place] - 3/18/2021  Medicare offers a range of preventive health benefits. Some of the tests and screenings are paid in full while other may be subject to a deductible, co-insurance, and/or copay.     Some of these benefits include a comprehensive review of your medical history including lifestyle, illnesses that may run in your family, and various assessments and screenings as appropriate. After reviewing your medical record and screening and assessments performed today your provider may have ordered immunizations, labs, imaging, and/or referrals for you. A list of these orders (if applicable) as well as your Preventive Care list are included within your After Visit Summary for your review. Other Preventive Recommendations:    A preventive eye exam performed by an eye specialist is recommended every 1-2 years to screen for glaucoma; cataracts, macular degeneration, and other eye disorders. A preventive dental visit is recommended every 6 months. Try to get at least 150 minutes of exercise per week or 10,000 steps per day on a pedometer . Order or download the FREE \"Exercise & Physical Activity: Your Everyday Guide\" from The [employer]. Call 1-652.174.8017 or search The [employer] online. You need 9862-8624 mg of calcium and 4501-2019 IU of vitamin D per day. It is possible to meet your calcium requirement with diet alone, but a vitamin D supplement is usually necessary to meet this goal.  When exposed to the sun, use a sunscreen that protects against both UVA and UVB radiation with an SPF of 30 or greater. Reapply every 2 to 3 hours or after sweating, drying off with a towel, or swimming. Always wear a seat belt when traveling in a car. Always wear a helmet when riding a bicycle or motorcycle.

## 2021-03-18 NOTE — PROGRESS NOTES
Medicare Annual Wellness Visit  Name: Areli Brambila Date: 3/18/2021   MRN: [id number] Sex: Female   Age: 80 y.o. Ethnicity: Non-/Non    : 10/20/1931 Race: Kailyn Wilson is here for Medicare AWV    Screenings for behavioral, psychosocial and functional/safety risks, and cognitive dysfunction are all negative except as indicated below. These results, as well as other patient data from the [address] form, are documented in Flowsheets linked to this Encounter. No Known Allergies    Prior to Visit Medications    Medication Sig Taking? Authorizing Provider   linagliptin (TRADJENTA) 5 MG tablet Take 1 tablet by mouth daily  Bryson Ardon MD   lisinopril (PRINIVIL;ZESTRIL) 10 MG tablet TAKE 1 TABLET BY MOUTH EVERY DAY  Bryson Ardon MD   PARoxetine (PAXIL) 20 MG tablet TAKE 1 TABLET BY MOUTH EVERY DAY IN THE MORNING  Bryson Ardon MD   lansoprazole (PREVACID) 30 MG delayed release capsule TAKE 1 CAPSULE BY MOUTH EVERY DAY  Bryson Ardon MD   triamterene-hydroCHLOROthiazide (DYAZIDE) 37.5-25 MG per capsule TAKE 1 CAPSULE BY MOUTH EVERY DAY  Bryson Ardon MD   lovastatin (MEVACOR) 40 MG tablet TAKE 1 TABLET BY MOUTH NIGHTLY  Bryson Ardon MD   ondansetron (ZOFRAN ODT) 4 MG disintegrating tablet Take 1 tablet by mouth every 8 hours as needed for Nausea or Vomiting  Refjeb Mercado MD   FIBER PO Take 1 tablet by mouth daily  Historical Provider, MD   aspirin 81 MG tablet Take 81 mg by mouth 2 times daily   Historical Provider, MD   Cholecalciferol (VITAMIN D) 2000 UNITS CAPS capsule Take 1 capsule by mouth daily.   Yajaira Bergman MD       Past Medical History:   Diagnosis Date    Cancer [facility])     colon    [facility] ([facility] 10/02    Diabetes mellitus ([facility]     Esophageal reflux     Heart disease     Hypercholesterolemia     Hypertension     Microalbuminuria     Osteoarthrosis     Osteoporosis        Past Surgical History:   Procedure Laterality Date    COLONOSCOPY  , 12  COLONOSCOPY  07/10/2017    Dr. Terra Rico:  3 adenomatous polyps.  DIAGNOSTIC CARDIAC CATH LAB PROCEDURE  2/28/08    HEMICOLECTOMY  10/02    HYSTERECTOMY      KNEE SURGERY  1973    PA COLON CA SCRN NOT  [address] [facility] N/A 7/10/2017    COL[facility] OR       Family History   Problem Relation Age of Onset    Heart Attack Brother        CareTeam (Including outside providers/suppliers regularly involved in providing care):   Patient Care Team:  Diego Sanchez MD as PCP - General (Internal Medicine)  Diego Sanchez MD as PCP - [facility]  Jevon Ko MD as Consulting Physician (Cardiology)    Wt Readings from Last 3 Encounters:   03/18/21 164 lb (74.4 kg)   03/16/21 163 lb (73.9 kg)   03/08/21 165 lb (74.8 kg)     Vitals:    03/18/21 1355   BP: 136/80   Site: Right Upper Arm   Position: Sitting   Cuff Size: Medium Adult   Pulse: 78   Weight: 164 lb (74.4 kg)   Height: 5' 4\" (1.626 m)     Body mass index is 28.15 kg/m². Based upon direct observation of the patient, evaluation of cognition reveals recent and remote memory intact. Patient's complete Health Risk Assessment and screening values have been reviewed and are found in Flowsheets. The following problems were reviewed today and where indicated follow up appointments were made and/or referrals ordered. Positive Risk Factor Screenings with Interventions:          General Health and ACP:  General  In general, how would you say your health is?: Good  In the past 7 days, have you experienced any of the following?  New or Increased Pain, New or Increased Fatigue, Loneliness, Social Isolation, Stress or Anger?: None of These  Do you get the social and emotional support that you need?: Yes  Do you have a Living Will?: Yes  Advance Directives     Power of [employer] Will ACP-Advance Directive ACP-Power of     Not on File Not on File Not on File Not on File      General Health Risk Interventions:  · no concerns at this time     Hearing/Vision:  No exam data present  Hearing/Vision  Do you or your family notice any trouble with your hearing that hasn't been managed with hearing aids?: (!) Yes(hearing aids do not work good)  Do you have difficulty driving, watching TV, or doing any of your daily activities because of your eyesight?: (!) Yes  Have you had an eye exam within the past year?: (!) No(she is planning to call eye doctor to make appt)  Hearing/Vision Interventions:  · Hearing concerns:  Patient sees [place] in [place], she will call to make appt      Personalized Preventive Plan   Current Health Maintenance Status  Immunization History   Administered Date(s) Administered    COVID-19, Moderna, PF, 100mcg/0.5mL 02/26/2021    Influenza 11/08/2011, 10/30/2013    Influenza Virus Vaccine 10/30/2013, 11/21/2014, 11/30/2015    Influenza, High Dose (Fluzone 65 yrs and older) 10/11/2012    Influenza, Franki Days, IM, PF (6 mo and older Fluzone, Flulaval, Fluarix, and 3 yrs and older Afluria) 01/06/2017, 02/09/2018, 02/08/2019, 08/22/2019, 11/24/2020    Pneumococcal Conjugate 13-valent ([id number]) 06/17/2016    Pneumococcal Polysaccharide ([id number]) 07/06/2017, 02/05/2018        Health Maintenance   Topic Date Due    DTaP/Tdap/Td vaccine (1 - Tdap) Never done    Shingles Vaccine (1 of 2) Never done   [employer] Visit (AWV)  Never done    COVID-19 Vaccine (2 - Moderna 2-dose series) 03/26/2021    Lipid screen  03/02/2022    Potassium monitoring  03/02/2022    Creatinine monitoring  03/02/2022    Flu vaccine  Completed    Pneumococcal 65+ years Vaccine  Completed    Hepatitis A vaccine  Aged Out    Hib vaccine  Aged Out    Meningococcal (ACWY) vaccine  Aged Out     Recommendations for [email or web address] Due: see orders and patient instructions/AVS.  .   Recommended screening schedule for the next 5-10 years is provided to the patient in written form: see Patient Instructions/AVS.    I, Layla Blank LPN, [birth year], performed the documented evaluation under the direct supervision of the attending physician. Diagnosis Orders   1. Routine general medical examination at a health care facility           1. Routine general medical examination at a health care facility  See above concerns and discussion       This encounter was performed under Bryson gaston MDs, direct supervision, 3/18/2021. Advance Care Planning     General Advance Care Planning (ACP) Conversation    Date of Conversation: 3/18/2021  Conducted with: Patient with Decision Making Capacity    Healthcare Decision Maker:      Primary Decision Maker: Smith,Merlin - Spouse - [phone]    Secondary Decision Maker: Clara Bhatti - Child - [phone]    Supplemental (Other) Decision Maker: Tran Starr - Step Child - [phone]    Click here to complete [address] including selection of the Healthcare Decision Maker Relationship (ie \"Primary\")  Today we documented Decision Maker(s) consistent with ACP documents on file. Content/Action Overview:   Has ACP document(s) NOT on file - requested patient to provide  Reviewed DNR/DNI and patient elects Full Code (Attempt Resuscitation)  full code  order pended    Length of Voluntary ACP Conversation in minutes:  30 minutes    Layla Blank

## 2021-03-24 DIAGNOSIS — K21.00 GASTROESOPHAGEAL REFLUX DISEASE WITH ESOPHAGITIS: ICD-10-CM

## 2021-03-25 DIAGNOSIS — I10 BENIGN ESSENTIAL HTN: ICD-10-CM

## 2021-03-25 RX ORDER — TRIAMTERENE AND HYDROCHLOROTHIAZIDE 37.5; 25 MG/1; MG/1
CAPSULE ORAL
Qty: 30 CAPSULE | Refills: 5 | Status: SHIPPED | OUTPATIENT
Start: 2021-03-25 | End: 2021-09-15

## 2021-03-25 RX ORDER — LANSOPRAZOLE 30 MG/1
CAPSULE, DELAYED RELEASE ORAL
Qty: 30 CAPSULE | Refills: 5 | Status: SHIPPED | OUTPATIENT
Start: 2021-03-25 | End: 2021-09-15

## 2021-03-25 NOTE — TELEPHONE ENCOUNTER
Health Maintenance   Topic Date Due    DTaP/Tdap/Td vaccine (1 - Tdap) Never done    Shingles Vaccine (1 of 2) Never done    COVID-19 Vaccine (2 - Moderna 2-dose series) 03/26/2021    Lipid screen  03/02/2022    Potassium monitoring  03/02/2022    Creatinine monitoring  03/02/2022    Annual Wellness Visit (AWV)  03/19/2022    Flu vaccine  Completed    Pneumococcal 65+ years Vaccine  Completed    Hepatitis A vaccine  Aged Out    Hib vaccine  Aged Out    Meningococcal (ACWY) vaccine  Aged Out             (applicable per patient's age: Cancer Screenings, Depression Screening, Fall Risk Screening, Immunizations)    Hemoglobin A1C (%)   Date Value   03/02/2021 5.8   03/09/2020 5.7   03/20/2019 5.5     Microalb/Crt.  Ratio (mcg/mg creat)   Date Value   08/07/2013 132     LDL Cholesterol (mg/dL)   Date Value   03/02/2021 67     AST (U/L)   Date Value   03/02/2021 13     ALT (U/L)   Date Value   03/02/2021 8     BUN (mg/dL)   Date Value   03/02/2021 27 (H)      (goal A1C is < 7)   (goal LDL is <100) need 30-50% reduction from baseline     BP Readings from Last 3 Encounters:   03/18/21 136/80   03/16/21 136/78   03/08/21 (!) 160/80    (goal /80)      All Future Testing planned in [facility]:  Lab Frequency Next Occurrence   Hemoglobin A1C Once 03/30/2021   CBC Auto Differential Once 03/08/2022   Comprehensive Metabolic Panel Once 15/42/3166   Vitamin D 25 Hydroxy Once 03/08/2022   Magnesium Once 03/08/2022   Lipid Panel Once 03/08/2022   TSH with Reflex Once 03/08/2022   EKG 12 Lead Once 03/08/2022   Hemoglobin A1C Once 03/08/2022   XR CHEST (2 VW) Once 09/02/6523   Basic Metabolic Panel Once 40/72/9131       Next Visit Date:  Future Appointments   Date Time Provider Pradeep Coello   9/21/2021 10:45 AM MD Yevgeniy Nunez TOLPP   3/7/2022 10:00 AM Caryle Roys, MD Lesli Pear WANDREA   3/22/2022  2:00 PM [facility] [place], [address] [place] [address] [address] [facility]            Patient Active Problem List:     Vitamin D deficiency     Osteoporosis, senile     Mixed hypercholesterolemia and hypertriglyceridemia     History of colon cancer     GERD (gastroesophageal reflux disease)     CAD (coronary artery disease)     Benign essential HTN     Hypercholesterolemia     Cancer ([facility]     Osteoarthrosis     Colon cancer ([facility]     CRI (chronic renal insufficiency)     Controlled type 2 diabetes mellitus with stage 2 chronic kidney disease, without long-term current use of insulin ([facility]     Major depressive disorder with single episode, in full [facility]

## 2021-03-25 NOTE — TELEPHONE ENCOUNTER
Health Maintenance   Topic Date Due    DTaP/Tdap/Td vaccine (1 - Tdap) Never done    Shingles Vaccine (1 of 2) Never done    COVID-19 Vaccine (2 - Moderna 2-dose series) 03/26/2021    Lipid screen  03/02/2022    Potassium monitoring  03/02/2022    Creatinine monitoring  03/02/2022    Annual Wellness Visit (AWV)  03/19/2022    Flu vaccine  Completed    Pneumococcal 65+ years Vaccine  Completed    Hepatitis A vaccine  Aged Out    Hib vaccine  Aged Out    Meningococcal (ACWY) vaccine  Aged Out             (applicable per patient's age: Cancer Screenings, Depression Screening, Fall Risk Screening, Immunizations)    Hemoglobin A1C (%)   Date Value   03/02/2021 5.8   03/09/2020 5.7   03/20/2019 5.5     Microalb/Crt.  Ratio (mcg/mg creat)   Date Value   08/07/2013 132     LDL Cholesterol (mg/dL)   Date Value   03/02/2021 67     AST (U/L)   Date Value   03/02/2021 13     ALT (U/L)   Date Value   03/02/2021 8     BUN (mg/dL)   Date Value   03/02/2021 27 (H)      (goal A1C is < 7)   (goal LDL is <100) need 30-50% reduction from baseline     BP Readings from Last 3 Encounters:   03/18/21 136/80   03/16/21 136/78   03/08/21 (!) 160/80    (goal /80)      All Future Testing planned in [facility]:  Lab Frequency Next Occurrence   Hemoglobin A1C Once 03/30/2021   CBC Auto Differential Once 03/08/2022   Comprehensive Metabolic Panel Once 77/12/4801   Vitamin D 25 Hydroxy Once 03/08/2022   Magnesium Once 03/08/2022   Lipid Panel Once 03/08/2022   TSH with Reflex Once 03/08/2022   EKG 12 Lead Once 03/08/2022   Hemoglobin A1C Once 03/08/2022   XR CHEST (2 VW) Once 97/08/6649   Basic Metabolic Panel Once 04/42/4590       Next Visit Date:  Future Appointments   Date Time Provider Pradeep Coello   9/21/2021 10:45 [facility]   3/7/2022 10:00 AM MD Abilio Alvarez W   3/22/2022  2:00 PM [facility] [place], [address] [place] [address] [address] [facility]            Patient Active Problem List:     Vitamin D deficiency     Osteoporosis, senile     Mixed hypercholesterolemia and hypertriglyceridemia     History of colon cancer     GERD (gastroesophageal reflux disease)     CAD (coronary artery disease)     Benign essential HTN     Hypercholesterolemia     Cancer ([facility]     Osteoarthrosis     Colon cancer ([facility]     CRI (chronic renal insufficiency)     Controlled type 2 diabetes mellitus with stage 2 chronic kidney disease, without long-term current use of insulin ([facility]     Major depressive disorder with single episode, in full remission [facility]

## 2021-06-15 DIAGNOSIS — F32.A DEPRESSION: ICD-10-CM

## 2021-06-16 RX ORDER — PAROXETINE HYDROCHLORIDE 20 MG/1
TABLET, FILM COATED ORAL
Qty: 30 TABLET | Refills: 5 | Status: SHIPPED | OUTPATIENT
Start: 2021-06-16 | End: 2022-02-08

## 2021-06-16 NOTE — TELEPHONE ENCOUNTER
Health Maintenance   Topic Date Due    DTaP/Tdap/Td vaccine (1 - Tdap) Never done    Shingles Vaccine (1 of 2) Never done    COVID-19 Vaccine (2 - Moderna 2-dose series) 03/26/2021    Lipid screen  03/02/2022    Potassium monitoring  03/02/2022    Creatinine monitoring  03/02/2022    Annual Wellness Visit (AWV)  03/19/2022    Flu vaccine  Completed    Pneumococcal 65+ years Vaccine  Completed    Hepatitis A vaccine  Aged Out    Hib vaccine  Aged Out    Meningococcal (ACWY) vaccine  Aged Out             (applicable per patient's age: Cancer Screenings, Depression Screening, Fall Risk Screening, Immunizations)    Hemoglobin A1C (%)   Date Value   03/02/2021 5.8   03/09/2020 5.7   03/20/2019 5.5     Microalb/Crt.  Ratio (mcg/mg creat)   Date Value   08/07/2013 132     LDL Cholesterol (mg/dL)   Date Value   03/02/2021 67     AST (U/L)   Date Value   03/02/2021 13     ALT (U/L)   Date Value   03/02/2021 8     BUN (mg/dL)   Date Value   03/02/2021 27 (H)      (goal A1C is < 7)   (goal LDL is <100) need 30-50% reduction from baseline     BP Readings from Last 3 Encounters:   03/18/21 136/80   03/16/21 136/78   03/08/21 (!) 160/80    (goal /80)      All Future Testing planned in [facility]:  Lab Frequency Next Occurrence   CBC Auto Differential Once 03/08/2022   Comprehensive Metabolic Panel Once 07/80/9122   Vitamin D 25 Hydroxy Once 03/08/2022   Magnesium Once 03/08/2022   Lipid Panel Once 03/08/2022   TSH with Reflex Once 03/08/2022   EKG 12 Lead Once 03/08/2022   Hemoglobin A1C Once 03/08/2022   XR CHEST (2 VW) Once 12/05/4712   Basic Metabolic Panel Once 16/50/2039       Next Visit Date:  Future Appointments   Date Time Provider Pradeep Coello   9/21/2021 10:45 AM [facility]   3/7/2022 10:00 AM MD Juliet Ridley [facility]   3/22/2022  2:00 PM Alyssia LUCERO1 [facility]            Patient Active Problem List:     Vitamin D deficiency Osteoporosis, senile     Mixed hypercholesterolemia and hypertriglyceridemia     History of colon cancer     GERD (gastroesophageal reflux disease)     CAD (coronary artery disease)     Benign essential HTN     Hypercholesterolemia     Cancer (HCC)     Osteoarthrosis     Colon cancer (HCC)     CRI (chronic renal insufficiency)     Controlled type 2 diabetes mellitus with stage 2 chronic kidney disease, without long-term current use of insulin ([facility]     Major depressive disorder with single episode, in full remission ([facility]

## 2021-08-15 DIAGNOSIS — I10 BENIGN ESSENTIAL HTN: ICD-10-CM

## 2021-08-16 RX ORDER — LISINOPRIL 10 MG/1
TABLET ORAL
Qty: 30 TABLET | Refills: 5 | Status: SHIPPED | OUTPATIENT
Start: 2021-08-16 | End: 2022-04-08

## 2021-08-16 NOTE — TELEPHONE ENCOUNTER
Health Maintenance   Topic Date Due    DTaP/Tdap/Td vaccine (1 - Tdap) Never done    Shingles Vaccine (1 of 2) Never done    COVID-19 Vaccine (2 - Moderna 2-dose series) 03/26/2021    Flu vaccine (1) 09/01/2021    Lipid screen  03/02/2022    Potassium monitoring  03/02/2022    Creatinine monitoring  03/02/2022    Annual Wellness Visit (AWV)  03/19/2022    Pneumococcal 65+ years Vaccine  Completed    Hepatitis A vaccine  Aged Out    Hib vaccine  Aged Out    Meningococcal (ACWY) vaccine  Aged Out             (applicable per patient's age: Cancer Screenings, Depression Screening, Fall Risk Screening, Immunizations)    Hemoglobin A1C (%)   Date Value   03/02/2021 5.8   03/09/2020 5.7   03/20/2019 5.5     Microalb/Crt.  Ratio (mcg/mg creat)   Date Value   08/07/2013 132     LDL Cholesterol (mg/dL)   Date Value   03/02/2021 67     AST (U/L)   Date Value   03/02/2021 13     ALT (U/L)   Date Value   03/02/2021 8     BUN (mg/dL)   Date Value   03/02/2021 27 (H)      (goal A1C is < 7)   (goal LDL is <100) need 30-50% reduction from baseline     BP Readings from Last 3 Encounters:   03/18/21 136/80   03/16/21 136/78   03/08/21 (!) 160/80    (goal /80)      All Future Testing planned in [facility]:  Lab Frequency Next Occurrence   CBC Auto Differential Once 03/08/2022   Comprehensive Metabolic Panel Once 30/11/5470   Vitamin D 25 Hydroxy Once 03/08/2022   Magnesium Once 03/08/2022   Lipid Panel Once 03/08/2022   TSH with Reflex Once 03/08/2022   EKG 12 Lead Once 03/08/2022   Hemoglobin A1C Once 03/08/2022   XR CHEST (2 VW) Once 91/08/3846   Basic Metabolic Panel Once 75/74/8730       Next Visit Date:  Future Appointments   Date Time Provider Pradeep Coello   9/21/2021 10:45 AM MD Xavier Tidwell TOLPP   3/7/2022 10:00 AM MD Annamaria Addison [place] [facility]   3/22/2022  2:00 PM [facility]X [place], [address] [place] [address]            Patient Active Problem List:     Vitamin D deficiency     Osteoporosis, senile     Mixed hypercholesterolemia and hypertriglyceridemia     History of colon cancer     GERD (gastroesophageal reflux disease)     CAD (coronary artery disease)     Benign essential HTN     Hypercholesterolemia     Cancer (HCC)     Osteoarthrosis     Colon cancer (HCC)     CRI (chronic renal insufficiency)     Controlled type 2 diabetes mellitus with stage 2 chronic kidney disease, without long-term current use of insulin [facility]     Major depressive disorder with single episode, in full remission [facility]

## 2021-09-14 DIAGNOSIS — K21.00 GASTROESOPHAGEAL REFLUX DISEASE WITH ESOPHAGITIS: ICD-10-CM

## 2021-09-14 DIAGNOSIS — I10 BENIGN ESSENTIAL HTN: ICD-10-CM

## 2021-09-14 DIAGNOSIS — E78.2 MIXED HYPERLIPIDEMIA: ICD-10-CM

## 2021-09-15 RX ORDER — LOVASTATIN 40 MG/1
TABLET ORAL
Qty: 60 TABLET | Refills: 5 | Status: SHIPPED | OUTPATIENT
Start: 2021-09-15 | End: 2022-09-23

## 2021-09-15 RX ORDER — TRIAMTERENE AND HYDROCHLOROTHIAZIDE 37.5; 25 MG/1; MG/1
CAPSULE ORAL
Qty: 30 CAPSULE | Refills: 5 | Status: SHIPPED | OUTPATIENT
Start: 2021-09-15 | End: 2022-07-15

## 2021-09-15 RX ORDER — LANSOPRAZOLE 30 MG/1
CAPSULE, DELAYED RELEASE ORAL
Qty: 30 CAPSULE | Refills: 5 | Status: SHIPPED | OUTPATIENT
Start: 2021-09-15 | End: 2022-04-08

## 2021-09-15 NOTE — TELEPHONE ENCOUNTER
Health Maintenance   Topic Date Due    DTaP/Tdap/Td vaccine (1 - Tdap) Never done    Shingles Vaccine (1 of 2) Never done    COVID-19 Vaccine (2 - Moderna 2-dose series) 03/26/2021    Flu vaccine (1) 09/01/2021    Lipid screen  03/02/2022    Potassium monitoring  03/02/2022    Creatinine monitoring  03/02/2022    Annual Wellness Visit (AWV)  03/19/2022    Pneumococcal 65+ years Vaccine  Completed    Hepatitis A vaccine  Aged Out    Hib vaccine  Aged Out    Meningococcal (ACWY) vaccine  Aged Out             (applicable per patient's age: Cancer Screenings, Depression Screening, Fall Risk Screening, Immunizations)    Hemoglobin A1C (%)   Date Value   03/02/2021 5.8   03/09/2020 5.7   03/20/2019 5.5     Microalb/Crt.  Ratio (mcg/mg creat)   Date Value   08/07/2013 132     LDL Cholesterol (mg/dL)   Date Value   03/02/2021 67     AST (U/L)   Date Value   03/02/2021 13     ALT (U/L)   Date Value   03/02/2021 8     BUN (mg/dL)   Date Value   03/02/2021 27 (H)      (goal A1C is < 7)   (goal LDL is <100) need 30-50% reduction from baseline     BP Readings from Last 3 Encounters:   03/18/21 136/80   03/16/21 136/78   03/08/21 (!) 160/80    (goal /80)      All Future Testing planned in [facility]:  Lab Frequency Next Occurrence   CBC Auto Differential Once 03/08/2022   Comprehensive Metabolic Panel Once 51/42/2254   Vitamin D 25 Hydroxy Once 03/08/2022   Magnesium Once 03/08/2022   Lipid Panel Once 03/08/2022   TSH with Reflex Once 03/08/2022   EKG 12 Lead Once 03/08/2022   Hemoglobin A1C Once 03/08/2022   XR CHEST (2 VW) Once 30/49/6646   Basic Metabolic Panel Once 62/40/4569       Next Visit Date:  Future Appointments   Date Time Provider Pradeep Coello   9/21/2021 10:45 AM [facility]   3/7/2022 10:00 AM MD Pia Mobley [facility]   3/22/2022  2:00 [facility] [address] [place] [address]            Patient Active Problem List:     Vitamin D deficiency     Osteoporosis, senile     Mixed hypercholesterolemia and hypertriglyceridemia     History of colon cancer     GERD (gastroesophageal reflux disease)     CAD (coronary artery disease)     Benign essential HTN     Hypercholesterolemia     Cancer (HCC)     Osteoarthrosis     Colon cancer (HCC)     CRI (chronic renal insufficiency)     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

## 2021-09-21 ENCOUNTER — OFFICE VISIT (OUTPATIENT)
Dept: FAMILY MEDICINE CLINIC | Age: 86
End: 2021-09-21
Payer: MEDICARE

## 2021-09-21 VITALS
SYSTOLIC BLOOD PRESSURE: 120 MMHG | WEIGHT: 160 LBS | BODY MASS INDEX: 27.31 KG/M2 | HEART RATE: 86 BPM | DIASTOLIC BLOOD PRESSURE: 80 MMHG | HEIGHT: 64 IN

## 2021-09-21 DIAGNOSIS — E11.22 CONTROLLED TYPE 2 DIABETES MELLITUS WITH STAGE 2 CHRONIC KIDNEY DISEASE, WITHOUT LONG-TERM CURRENT USE OF INSULIN (HCC): ICD-10-CM

## 2021-09-21 DIAGNOSIS — Z23 NEED FOR INFLUENZA VACCINATION: ICD-10-CM

## 2021-09-21 DIAGNOSIS — N18.32 CHRONIC RENAL IMPAIRMENT, STAGE 3B (HCC): ICD-10-CM

## 2021-09-21 DIAGNOSIS — F32.5 MAJOR DEPRESSIVE DISORDER WITH SINGLE EPISODE, IN FULL REMISSION (HCC): ICD-10-CM

## 2021-09-21 DIAGNOSIS — I10 BENIGN ESSENTIAL HTN: Primary | ICD-10-CM

## 2021-09-21 DIAGNOSIS — E55.9 VITAMIN D DEFICIENCY: ICD-10-CM

## 2021-09-21 DIAGNOSIS — I25.10 CORONARY ARTERY DISEASE INVOLVING NATIVE CORONARY ARTERY OF NATIVE HEART WITHOUT ANGINA PECTORIS: ICD-10-CM

## 2021-09-21 DIAGNOSIS — E78.2 MIXED HYPERCHOLESTEROLEMIA AND HYPERTRIGLYCERIDEMIA: ICD-10-CM

## 2021-09-21 DIAGNOSIS — K21.9 GASTROESOPHAGEAL REFLUX DISEASE WITHOUT ESOPHAGITIS: ICD-10-CM

## 2021-09-21 DIAGNOSIS — N18.2 CONTROLLED TYPE 2 DIABETES MELLITUS WITH STAGE 2 CHRONIC KIDNEY DISEASE, WITHOUT LONG-TERM CURRENT USE OF INSULIN (HCC): ICD-10-CM

## 2021-09-21 PROCEDURE — G8427 DOCREV CUR MEDS BY ELIG CLIN: HCPCS | Performed by: INTERNAL MEDICINE

## 2021-09-21 PROCEDURE — 1036F TOBACCO NON-USER: CPT | Performed by: INTERNAL MEDICINE

## 2021-09-21 PROCEDURE — G0008 ADMIN INFLUENZA VIRUS VAC: HCPCS | Performed by: INTERNAL MEDICINE

## 2021-09-21 PROCEDURE — G8417 CALC BMI ABV UP PARAM F/U: HCPCS | Performed by: INTERNAL MEDICINE

## 2021-09-21 PROCEDURE — 90674 CCIIV4 VAC NO PRSV 0.5 ML IM: CPT | Performed by: INTERNAL MEDICINE

## 2021-09-21 PROCEDURE — 1090F PRES/ABSN URINE INCON ASSESS: CPT | Performed by: INTERNAL MEDICINE

## 2021-09-21 PROCEDURE — 4040F PNEUMOC VAC/ADMIN/RCVD: CPT | Performed by: INTERNAL MEDICINE

## 2021-09-21 PROCEDURE — 99214 OFFICE O/P EST MOD 30 MIN: CPT | Performed by: INTERNAL MEDICINE

## 2021-09-21 PROCEDURE — 1123F ACP DISCUSS/DSCN MKR DOCD: CPT | Performed by: INTERNAL MEDICINE

## 2021-09-21 SDOH — ECONOMIC STABILITY: FOOD INSECURITY: WITHIN THE PAST 12 MONTHS, THE FOOD YOU BOUGHT JUST DIDN'T LAST AND YOU DIDN'T HAVE MONEY TO GET MORE.: NEVER TRUE

## 2021-09-21 SDOH — ECONOMIC STABILITY: FOOD INSECURITY: WITHIN THE PAST 12 MONTHS, YOU WORRIED THAT YOUR FOOD WOULD RUN OUT BEFORE YOU GOT MONEY TO BUY MORE.: NEVER TRUE

## 2021-09-21 ASSESSMENT — ENCOUNTER SYMPTOMS
DIARRHEA: 0
RHINORRHEA: 0
CONSTIPATION: 0
SORE THROAT: 0
ABDOMINAL PAIN: 0
NAUSEA: 0
SHORTNESS OF BREATH: 0
COUGH: 0
CHEST TIGHTNESS: 0
BLOOD IN STOOL: 0

## 2021-09-21 ASSESSMENT — SOCIAL DETERMINANTS OF HEALTH (SDOH): HOW HARD IS IT FOR YOU TO PAY FOR THE VERY BASICS LIKE FOOD, HOUSING, MEDICAL CARE, AND HEATING?: NOT VERY HARD

## 2021-09-21 NOTE — PATIENT INSTRUCTIONS
Survey: You may be receiving a survey from [employer] regarding your visit today. You may get this in the mail, through your MyChart or in your email. Please complete the survey to enable us to provide the highest quality of care to you and your family. Please also, mention our names. If you cannot score us as very good (5 Stars) on any question, please feel free to call the office to discuss how we could have made your experience exceptional.      Thank You! MD Nasir Sage, Steven Ledesma, BSN RN    [facility], [address] [place] [address]      1. Need for influenza vaccination  [place] was given an influenza vaccine today. - INFLUENZA, MDCK QUADV, 2 YRS AND OLDER, IM, PF, PREFILL SYR OR SDV, 0.5ML (FLUCELVAX QUADV, PF)    2. Benign essential HTN  Controlled on the current medication. 3. Coronary artery disease involving native coronary artery of native heart without angina pectoris  No chest pain or increasing SOB. Follows with Dr. Manuel Martinez. 4. Controlled type 2 diabetes mellitus with stage 2 chronic kidney disease, without long-term current use of insulin ([address]  Controlled on Tradjenta. Obtain labs approximately one week prior to the office appointment. Please fast for 12 hours prior to obtaining the labs. Water or black coffee (no cream or sugar) is allowed prior to the the labs. 5. Chronic renal impairment, stage 3b ([address]  Has been stable. Avoid NSAIDS. 6. Gastroesophageal reflux disease without esophagitis  Controlled on PPI. 7. Major depressive disorder with single episode, in full remission ([address]  Stable on Paxil. 8. Mixed hypercholesterolemia and hypertriglyceridemia  Controlled on statin. Obtain labs approximately one week prior to the office appointment. Please fast for 12 hours prior to obtaining the labs. Water or black coffee (no cream or sugar) is allowed prior to the the labs. 9. Vitamin D deficiency  Controlled on Vitamin D replacement.     [place]

## 2021-09-21 NOTE — PROGRESS NOTES
[address] (:  10/20/1931) is a 80 y.o. female,Established patient, here for evaluation of the following chief complaint(s):  Diabetes, Hypertension, Hyperlipidemia, Gastroesophageal Reflux, Mental Health Problem, and Coronary Artery Disease (labs 3/2/21 Dr Catarina Medina)         ASSESSMENT/PLAN:  1. Benign essential HTN  2. Need for influenza vaccination  -     INFLUENZA, MDCK QUADV, 2 YRS AND OLDER, IM, PF, PREFILL SYR OR SDV, 0.5ML (FLUCELVAX QUADV, PF)  3. Coronary artery disease involving native coronary artery of native heart without angina pectoris  4. Controlled type 2 diabetes mellitus with stage 2 chronic kidney disease, without long-term current use of insulin ([address]  5. Chronic renal impairment, stage 3b (HCC)  6. Gastroesophageal reflux disease without esophagitis  7. Major depressive disorder with single episode, in full remission ([address]  8. Mixed hypercholesterolemia and hypertriglyceridemia  9. Vitamin D deficiency      Plan:  1. Need for influenza vaccination  [place] was given an influenza vaccine today. - INFLUENZA, MDCK QUADV, 2 YRS AND OLDER, IM, PF, PREFILL SYR OR SDV, 0.5ML (FLUCELVAX QUADV, PF)    2. Benign essential HTN  Controlled on the current medication. 3. Coronary artery disease involving native coronary artery of native heart without angina pectoris  No chest pain or increasing SOB. Follows with Dr. Catarina Medina. 4. Controlled type 2 diabetes mellitus with stage 2 chronic kidney disease, without long-term current use of insulin ([address]  Controlled on Tradjenta. Obtain labs approximately one week prior to the office appointment. Please fast for 12 hours prior to obtaining the labs. Water or black coffee (no cream or sugar) is allowed prior to the the labs. 5. Chronic renal impairment, stage 3b ([address]  Has been stable. Avoid NSAIDS. 6. Gastroesophageal reflux disease without esophagitis  Controlled on PPI.     7. Major depressive disorder with single episode, in full remission (HCC)  Stable on Paxil. 8. Mixed hypercholesterolemia and hypertriglyceridemia  Controlled on statin. Obtain labs approximately one week prior to the office appointment. Please fast for 12 hours prior to obtaining the labs. Water or black coffee (no cream or sugar) is allowed prior to the the labs. 9. Vitamin D deficiency  Controlled on Vitamin D replacement. [place] was instructed to follow up in the clinic in 6 months for check up or as needed with any medical issues. Subjective   SUBJECTIVE/OBJECTIVE:  [place] presents for a check up on her medical conditions DM II, HTN, Hyperlipidemia, CAD, GERD. [place] denies new problems. Medications were reviewed with [place], she is  tolerating the medication. Bowels are regular. There has not been rectal bleeding. [place] denies urinary complications, the urine stream is good. [place] denies chest pain and denies increasing shortness of breath. Labs from 3/21 reviewed. Past Medical History:  No date: Cancer [facility])      Comment:  colon  10/02: [facility] cancer ([facility]  No date: Diabetes mellitus ([facility]  No date: Esophageal reflux  No date: Heart disease  No date: Hypercholesterolemia  No date: Hypertension  No date: Microalbuminuria  No date: Osteoarthrosis  No date: Osteoporosis    Past Surgical History:  1/09, 5/1/12: COLONOSCOPY  07/10/2017: COLONOSCOPY      Comment:  Dr. Rob :  3 adenomatous polyps.   2/28/08: DIAGNOSTIC CARDIAC CATH LAB PROCEDURE  10/02: HEMICOLECTOMY  No date: HYSTERECTOMY  1973: KNEE SURGERY  7/10/2017: TX COLON CA SCRN NOT HI RSK IND; N/A      Comment:  COLONOSCOPY performed by Mariano Wang MD at [address] [place] [address] History      Marital status:       Spouse name: Not on file      Number of children: Not on file      Years of education: Not on file      Highest education level: Not on file    Occupational History        Employer: UNEMPLOYED    Tobacco Use      Smoking status: Former Smoker        Quit date: 10/11/1977        Years since quittin.9      Smokeless tobacco: Never Used    Substance and Sexual Activity      Alcohol use: No      Drug use: Not on file      Sexual activity: Not on file    Other Topics      Concerns:        Not on file    Social History Narrative      Not on file    Social Determinants of Health  Financial Resource Strain: Low Risk       Difficulty of Paying Living Expenses: Not very hard  Food Insecurity: No Food Insecurity      Worried About Running Out of Food in the Last Year: Never true      Ran Out of Food in the Last Year: Never true  Transportation Needs:       Lack of Transportation (Medical):       Lack of Transportation (Non-Medical):   Physical Activity:       Days of Exercise per Week:       Minutes of Exercise per Session:   Stress:       Feeling of Stress :   Social Connections:       Frequency of Communication with Friends and Family:       Frequency of Social Gatherings with Friends and Family:       Attends S[employer]ism Services:       Active Member of Clubs or Organizations:       Attends Club or Organization Meetings:       Marital Status:   Intimate Partner Violence:       Fear of Current or Ex-Partner:       Emotionally Abused:       Physically Abused:       Sexually Abused:     Review of patient's family history indicates:  Problem: Heart Attack      Relation: Brother          Age of Onset: (Not Specified)      Current Outpatient Medications on File Prior to Visit:  lansoprazole (PREVACID) 30 MG delayed release capsule, TAKE 1 CAPSULE BY MOUTH EVERY DAY, Disp: 30 capsule, Rfl: 5  triamterene-hydroCHLOROthiazide (DYAZIDE) 37.5-25 MG per capsule, TAKE 1 CAPSULE BY MOUTH EVERY DAY, Disp: 30 capsule, Rfl: 5  lovastatin (MEVACOR) 40 MG tablet, TAKE 1 TABLET BY MOUTH NIGHTLY, Disp: 60 tablet, Rfl: 5  lisinopril (PRINIVIL;ZESTRIL) 10 MG tablet, TAKE 1 TABLET BY MOUTH EVERY DAY, Disp: 30 tablet, Rfl: 5  PARoxetine (PAXIL) 20 MG tablet, TAKE 1 TABLET BY MOUTH EVERY DAY IN THE MORNING, Disp: 30 tablet, Rfl: 5  linagliptin (TRADJENTA) 5 MG tablet, Take 1 tablet by mouth daily, Disp: 30 tablet, Rfl: 1  FIBER PO, Take 1 tablet by mouth daily, Disp: , Rfl:   aspirin 81 MG tablet, Take 81 mg by mouth 2 times daily , Disp: , Rfl:   Cholecalciferol (VITAMIN D) 2000 UNITS CAPS capsule, Take 1 capsule by mouth daily. , Disp: 30 capsule, Rfl: 11  (DISCONTINUED) PARoxetine (PAXIL) 20 MG tablet, TAKE 1 TABLET BY MOUTH EVERY DAY IN THE MORNING, Disp: 30 tablet, Rfl: 5  ondansetron (ZOFRAN ODT) 4 MG disintegrating tablet, Take 1 tablet by mouth every 8 hours as needed for Nausea or Vomiting, Disp: 10 tablet, Rfl: 0    No current facility-administered medications on file prior to visit.       No Known Allergies      Lab Results       Component                Value               Date                       NA                       142                 03/02/2021                 K                        4.6                 03/02/2021                 CL                       106                 03/02/2021                 CO2                      24                  03/02/2021                 BUN                      27 (H)              03/02/2021                 CREATININE               1.81 (H)            03/02/2021                 GLUCOSE                  110 (H)             03/02/2021                 CALCIUM                  10.5 (H)            03/02/2021                 PROT                     7.1                 03/02/2021                 LABALBU                  4.3                 03/02/2021                 BILITOT                  0.32                03/02/2021                 ALKPHOS                  120 (H)             03/02/2021                 AST                      13                  03/02/2021                 ALT                      8                   03/02/2021                 LABGLOM                  26 (L)              03/02/2021                 GFRAA Results       Component                Value               Date                       CHOLHDLRATIO             2.5                 03/02/2021                 CHOLHDLRATIO             2.9                 03/09/2020                 CHOLHDLRATIO             2.9                 03/20/2019                              Diabetes  She presents for her follow-up diabetic visit. She has type 2 diabetes mellitus. Her disease course has been stable. There are no hypoglycemic associated symptoms. Pertinent negatives for diabetes include no chest pain. There are no hypoglycemic complications. Symptoms are stable. Risk factors for coronary artery disease include diabetes mellitus, dyslipidemia, sedentary lifestyle and family history. Current diabetic treatment includes oral agent (monotherapy). She is compliant with treatment all of the time. Her weight is stable. She never participates in exercise. There is no change in her home blood glucose trend. An ACE inhibitor/angiotensin II receptor blocker is being taken. Hypertension  This is a chronic problem. The current episode started more than 1 year ago. The problem is unchanged. The problem is controlled. Pertinent negatives include no chest pain, neck pain, palpitations or shortness of breath. Risk factors for coronary artery disease include diabetes mellitus, dyslipidemia and sedentary lifestyle. Past treatments include ACE inhibitors and diuretics. The current treatment provides significant improvement. There are no compliance problems. There is no history of angina. Hyperlipidemia  This is a chronic problem. The current episode started more than 1 year ago. The problem is controlled. Recent lipid tests were reviewed and are normal. Pertinent negatives include no chest pain, myalgias or shortness of breath. Current antihyperlipidemic treatment includes statins. The current treatment provides significant improvement of lipids. There are no compliance problems. Gastroesophageal Reflux  She reports no abdominal pain, no chest pain, no coughing, no nausea or no sore throat. This is a chronic problem. The current episode started more than 1 year ago. The problem occurs rarely. The problem has been unchanged. She has tried a PPI for the symptoms. The treatment provided significant relief. Coronary Artery Disease  Presents for follow-up visit. Pertinent negatives include no chest pain, chest tightness, palpitations or shortness of breath. Risk factors include hyperlipidemia. The symptoms have been stable. Compliance with diet is good. Compliance with exercise is poor. Compliance with medications is good. Review of Systems   Constitutional: Negative. HENT: Negative for congestion, ear pain, rhinorrhea, sneezing and sore throat. Eyes: Negative for visual disturbance. Respiratory: Negative for cough, chest tightness and shortness of breath. Cardiovascular: Negative for chest pain and palpitations. Gastrointestinal: Negative for abdominal pain, blood in stool, constipation, diarrhea and nausea. Genitourinary: Negative for difficulty urinating, dysuria, frequency, menstrual problem and urgency. Musculoskeletal: Negative for arthralgias, joint swelling, myalgias and neck pain. Skin: Negative. Neurological: Negative for syncope. Psychiatric/Behavioral: Negative. Objective   Physical Exam  Constitutional:       Appearance: She is well-developed. HENT:      Head: Atraumatic. Eyes:      Conjunctiva/sclera: Conjunctivae normal.   Cardiovascular:      Rate and Rhythm: Normal rate and regular rhythm. Heart sounds: Murmur heard. Pulmonary:      Effort: Pulmonary effort is normal.      Breath sounds: Normal breath sounds. Abdominal:      Palpations: Abdomen is soft. Tenderness: There is no abdominal tenderness. Musculoskeletal:         General: Normal range of motion. Cervical back: Normal range of motion and neck supple. Lymphadenopathy:      Cervical: No cervical adenopathy. Skin:     Findings: No rash. Neurological:      Mental Status: She is alert. Psychiatric:         Behavior: Behavior normal.         Thought Content: Thought content normal.                  An electronic signature was used to authenticate this note.     --Breanne Alejandro MD

## 2022-02-08 DIAGNOSIS — F32.A DEPRESSION: ICD-10-CM

## 2022-02-08 RX ORDER — PAROXETINE HYDROCHLORIDE 20 MG/1
TABLET, FILM COATED ORAL
Qty: 30 TABLET | Refills: 5 | Status: SHIPPED | OUTPATIENT
Start: 2022-02-08 | End: 2022-07-11

## 2022-02-08 NOTE — TELEPHONE ENCOUNTER
Health Maintenance   Topic Date Due    DTaP/Tdap/Td vaccine (1 - Tdap) Never done    Shingles Vaccine (1 of 2) Never done    COVID-19 Vaccine (3 - Booster for Moderna series) 09/05/2021    Lipid screen  03/02/2022    Potassium monitoring  03/02/2022    Creatinine monitoring  03/02/2022    Depression Monitoring  03/18/2022    Annual Wellness Visit (AWV)  03/19/2022    Flu vaccine  Completed    Pneumococcal 65+ years Vaccine  Completed    Hepatitis A vaccine  Aged Out    Hib vaccine  Aged Out    Meningococcal (ACWY) vaccine  Aged Out             (applicable per patient's age: Cancer Screenings, Depression Screening, Fall Risk Screening, Immunizations)    Hemoglobin A1C (%)   Date Value   03/02/2021 5.8   03/09/2020 5.7   03/20/2019 5.5     Microalb/Crt.  Ratio (mcg/mg creat)   Date Value   08/07/2013 132     LDL Cholesterol (mg/dL)   Date Value   03/02/2021 67     AST (U/L)   Date Value   03/02/2021 13     ALT (U/L)   Date Value   03/02/2021 8     BUN (mg/dL)   Date Value   03/02/2021 27 (H)      (goal A1C is < 7)   (goal LDL is <100) need 30-50% reduction from baseline     BP Readings from Last 3 Encounters:   09/21/21 120/80   03/18/21 136/80   03/16/21 136/78    (goal /80)      All Future Testing planned in [facility]:  Lab Frequency Next Occurrence   CBC Auto Differential Once 03/08/2022   Comprehensive Metabolic Panel Once 62/51/2402   Vitamin D 25 Hydroxy Once 03/08/2022   Magnesium Once 03/08/2022   Lipid Panel Once 03/08/2022   TSH with Reflex Once 03/08/2022   EKG 12 Lead Once 03/08/2022   Hemoglobin A1C Once 03/08/2022   XR CHEST (2 VW) Once 71/18/1347   Basic Metabolic Panel Once 13/45/5277       Next Visit Date:  Future Appointments   Date Time Provider Pradeep Coello   3/7/2022 10:00 AM MD Jean Cabrera [facility]   3/22/2022  2:00 PM MHPX Gisel Link NURSE Rudi BERNABE [facility]   3/29/2022  1:45 PM MD Estefani Flores [facility] [facility]            Patient Active Problem List:     Vitamin D deficiency     Osteoporosis, senile     Mixed hypercholesterolemia and hypertriglyceridemia     History of colon cancer     GERD (gastroesophageal reflux disease)     CAD (coronary artery disease)     Benign essential HTN     Cancer ([facility]     Osteoarthrosis     Colon cancer ([facility]     CRI (chronic renal insufficiency)     Controlled type 2 diabetes mellitus with stage 2 chronic kidney disease, without long-term current use of insulin ([facility]     Major depressive disorder with single episode, in full remission [facility]

## 2022-02-28 ENCOUNTER — TELEPHONE (OUTPATIENT)
Dept: FAMILY MEDICINE CLINIC | Age: 87
End: 2022-02-28

## 2022-02-28 DIAGNOSIS — N18.2 CONTROLLED TYPE 2 DIABETES MELLITUS WITH STAGE 2 CHRONIC KIDNEY DISEASE, WITHOUT LONG-TERM CURRENT USE OF INSULIN (HCC): ICD-10-CM

## 2022-02-28 DIAGNOSIS — E11.22 CONTROLLED TYPE 2 DIABETES MELLITUS WITH STAGE 2 CHRONIC KIDNEY DISEASE, WITHOUT LONG-TERM CURRENT USE OF INSULIN (HCC): ICD-10-CM

## 2022-03-04 ENCOUNTER — HOSPITAL ENCOUNTER (OUTPATIENT)
Dept: GENERAL RADIOLOGY | Age: 87
Discharge: HOME | End: 2022-03-06
Payer: MEDICARE

## 2022-03-04 ENCOUNTER — HOSPITAL ENCOUNTER (OUTPATIENT)
Age: 87
Discharge: HOME | End: 2022-03-04
Payer: MEDICARE

## 2022-03-04 ENCOUNTER — HOSPITAL ENCOUNTER (OUTPATIENT)
Age: 87
Discharge: HOME | End: 2022-03-06
Payer: MEDICARE

## 2022-03-04 DIAGNOSIS — E11.22 CONTROLLED TYPE 2 DIABETES MELLITUS WITH STAGE 2 CHRONIC KIDNEY DISEASE, WITHOUT LONG-TERM CURRENT USE OF INSULIN (HCC): ICD-10-CM

## 2022-03-04 DIAGNOSIS — I25.10 CORONARY ARTERY DISEASE INVOLVING NATIVE CORONARY ARTERY OF NATIVE HEART WITHOUT ANGINA PECTORIS: ICD-10-CM

## 2022-03-04 DIAGNOSIS — N18.2 CONTROLLED TYPE 2 DIABETES MELLITUS WITH STAGE 2 CHRONIC KIDNEY DISEASE, WITHOUT LONG-TERM CURRENT USE OF INSULIN (HCC): ICD-10-CM

## 2022-03-04 DIAGNOSIS — E55.9 VITAMIN D DEFICIENCY: ICD-10-CM

## 2022-03-04 DIAGNOSIS — E78.2 MIXED HYPERLIPIDEMIA: ICD-10-CM

## 2022-03-04 DIAGNOSIS — I10 BENIGN ESSENTIAL HTN: ICD-10-CM

## 2022-03-04 LAB
ABSOLUTE EOS #: 0.1 K/UL (ref 0–0.4)
ABSOLUTE LYMPH #: 1.7 K/UL (ref 1–4.8)
ABSOLUTE MONO #: 0.3 K/UL (ref 0–1)
ALBUMIN SERPL-MCNC: 4.6 G/DL (ref 3.5–5.2)
ALP BLD-CCNC: 120 U/L (ref 35–104)
ALT SERPL-CCNC: 13 U/L (ref 5–33)
ANION GAP SERPL CALCULATED.3IONS-SCNC: 12 MMOL/L (ref 9–17)
AST SERPL-CCNC: 17 U/L
BASOPHILS # BLD: 0 % (ref 0–2)
BASOPHILS ABSOLUTE: 0 K/UL (ref 0–0.2)
BILIRUB SERPL-MCNC: 0.42 MG/DL (ref 0.3–1.2)
BUN BLDV-MCNC: 28 MG/DL (ref 8–23)
BUN/CREAT BLD: 14 (ref 9–20)
CALCIUM SERPL-MCNC: 9.9 MG/DL (ref 8.6–10.4)
CHLORIDE BLD-SCNC: 99 MMOL/L (ref 98–107)
CO2: 29 MMOL/L (ref 20–31)
CREAT SERPL-MCNC: 2.03 MG/DL (ref 0.5–0.9)
DIFFERENTIAL TYPE: YES
EOSINOPHILS RELATIVE PERCENT: 2 % (ref 0–5)
ESTIMATED AVERAGE GLUCOSE: 128 MG/DL
GFR AFRICAN AMERICAN: 28 ML/MIN
GFR NON-AFRICAN AMERICAN: 23 ML/MIN
GFR SERPL CREATININE-BSD FRML MDRD: ABNORMAL ML/MIN/{1.73_M2}
GLUCOSE BLD-MCNC: 113 MG/DL (ref 70–99)
HBA1C MFR BLD: 6.1 % (ref 4–6)
HCT VFR BLD CALC: 39.9 % (ref 36–46)
HEMOGLOBIN: 13.4 G/DL (ref 12–16)
LYMPHOCYTES # BLD: 29 % (ref 15–40)
MAGNESIUM: 1.8 MG/DL (ref 1.6–2.6)
MCH RBC QN AUTO: 29.4 PG (ref 26–34)
MCHC RBC AUTO-ENTMCNC: 33.7 G/DL (ref 31–37)
MCV RBC AUTO: 87.3 FL (ref 80–100)
MONOCYTES # BLD: 5 % (ref 4–8)
PATIENT FASTING?: YES
PDW BLD-RTO: 13.4 % (ref 12.1–15.2)
PLATELET # BLD: 258 K/UL (ref 140–450)
POTASSIUM SERPL-SCNC: 3.7 MMOL/L (ref 3.7–5.3)
RBC # BLD: 4.57 M/UL (ref 4–5.2)
SEG NEUTROPHILS: 64 % (ref 47–75)
SEGMENTED NEUTROPHILS ABSOLUTE COUNT: 3.6 K/UL (ref 2.5–7)
SODIUM BLD-SCNC: 140 MMOL/L (ref 135–144)
TOTAL PROTEIN: 7.7 G/DL (ref 6.4–8.3)
TSH SERPL DL<=0.05 MIU/L-ACNC: 1.28 MIU/L (ref 0.3–5)
WBC # BLD: 5.7 K/UL (ref 3.5–11)

## 2022-03-04 PROCEDURE — 80053 COMPREHEN METABOLIC PANEL: CPT

## 2022-03-04 PROCEDURE — 71046 X-RAY EXAM CHEST 2 VIEWS: CPT

## 2022-03-04 PROCEDURE — 82306 VITAMIN D 25 HYDROXY: CPT

## 2022-03-04 PROCEDURE — 84443 ASSAY THYROID STIM HORMONE: CPT

## 2022-03-04 PROCEDURE — 36415 COLL VENOUS BLD VENIPUNCTURE: CPT

## 2022-03-04 PROCEDURE — 83036 HEMOGLOBIN GLYCOSYLATED A1C: CPT

## 2022-03-04 PROCEDURE — 80061 LIPID PANEL: CPT

## 2022-03-04 PROCEDURE — 85025 COMPLETE CBC W/AUTO DIFF WBC: CPT

## 2022-03-04 PROCEDURE — 83735 ASSAY OF MAGNESIUM: CPT

## 2022-03-04 PROCEDURE — 93005 ELECTROCARDIOGRAM TRACING: CPT

## 2022-03-05 LAB
CHOLESTEROL/HDL RATIO: 2.7
CHOLESTEROL: 184 MG/DL
HDLC SERPL-MCNC: 69 MG/DL
LDL CHOLESTEROL: 85 MG/DL (ref 0–130)
TRIGL SERPL-MCNC: 149 MG/DL
VITAMIN D 25-HYDROXY: 35.6 NG/ML

## 2022-03-06 LAB
EKG ATRIAL RATE: 85 BPM
EKG P AXIS: 62 DEGREES
EKG P-R INTERVAL: 176 MS
EKG Q-T INTERVAL: 320 MS
EKG QRS DURATION: 58 MS
EKG QTC CALCULATION (BAZETT): 380 MS
EKG R AXIS: 73 DEGREES
EKG T AXIS: 70 DEGREES
EKG VENTRICULAR RATE: 85 BPM

## 2022-03-06 PROCEDURE — 93010 ELECTROCARDIOGRAM REPORT: CPT | Performed by: INTERNAL MEDICINE

## 2022-03-07 ENCOUNTER — OFFICE VISIT (OUTPATIENT)
Dept: CARDIOLOGY CLINIC | Age: 87
End: 2022-03-07
Payer: MEDICARE

## 2022-03-07 VITALS
BODY MASS INDEX: 27.98 KG/M2 | DIASTOLIC BLOOD PRESSURE: 70 MMHG | WEIGHT: 163 LBS | OXYGEN SATURATION: 97 % | SYSTOLIC BLOOD PRESSURE: 120 MMHG | HEART RATE: 110 BPM

## 2022-03-07 DIAGNOSIS — E78.2 MIXED HYPERLIPIDEMIA: ICD-10-CM

## 2022-03-07 DIAGNOSIS — I10 BENIGN ESSENTIAL HTN: ICD-10-CM

## 2022-03-07 DIAGNOSIS — I25.10 CORONARY ARTERY DISEASE INVOLVING NATIVE CORONARY ARTERY OF NATIVE HEART WITHOUT ANGINA PECTORIS: Primary | ICD-10-CM

## 2022-03-07 DIAGNOSIS — E55.9 VITAMIN D DEFICIENCY: ICD-10-CM

## 2022-03-07 DIAGNOSIS — N18.2 CONTROLLED TYPE 2 DIABETES MELLITUS WITH STAGE 2 CHRONIC KIDNEY DISEASE, WITHOUT LONG-TERM CURRENT USE OF INSULIN (HCC): ICD-10-CM

## 2022-03-07 DIAGNOSIS — E11.22 CONTROLLED TYPE 2 DIABETES MELLITUS WITH STAGE 2 CHRONIC KIDNEY DISEASE, WITHOUT LONG-TERM CURRENT USE OF INSULIN (HCC): ICD-10-CM

## 2022-03-07 PROCEDURE — G8427 DOCREV CUR MEDS BY ELIG CLIN: HCPCS | Performed by: INTERNAL MEDICINE

## 2022-03-07 PROCEDURE — 1090F PRES/ABSN URINE INCON ASSESS: CPT | Performed by: INTERNAL MEDICINE

## 2022-03-07 PROCEDURE — 99214 OFFICE O/P EST MOD 30 MIN: CPT | Performed by: INTERNAL MEDICINE

## 2022-03-07 PROCEDURE — G8417 CALC BMI ABV UP PARAM F/U: HCPCS | Performed by: INTERNAL MEDICINE

## 2022-03-07 PROCEDURE — G8482 FLU IMMUNIZE ORDER/ADMIN: HCPCS | Performed by: INTERNAL MEDICINE

## 2022-03-07 PROCEDURE — 1123F ACP DISCUSS/DSCN MKR DOCD: CPT | Performed by: INTERNAL MEDICINE

## 2022-03-07 PROCEDURE — 4040F PNEUMOC VAC/ADMIN/RCVD: CPT | Performed by: INTERNAL MEDICINE

## 2022-03-07 PROCEDURE — 1036F TOBACCO NON-USER: CPT | Performed by: INTERNAL MEDICINE

## 2022-03-07 NOTE — LETTER
Israel Vann M.D. [address] [place] [address] Susan Rebolledo 80  [phone]        2022        Jemma Leonardo MD  [address] [place] [address]    RE:   Thelma Romberg  :  10/20/1931    Dear Dr. Colten Bains:    [address]:  1.  Mild coronary artery disease, status post cardiac catheterization on 2008, that showed mild plaque disease. 2.  Chronic renal insufficiency, with creatinine slightly elevated in the 2 range, normally in the 1.6 to 1.8. HISTORY OF PRESENT ILLNESS:  I had the pleasure of seeing Mrs. Irais Jarvis in the office on 2022. She is a very pleasant, sharp 28-year-old female who is now staying alone. Her  passed away in 2021 after being in [facility]. She was  for 42 years. Her son, Jomar Sanchez, and daughter-in-law, Omar Gonsales, help bringing out food every week. Olga Earl also takes care of household needs. Her granddaughter, Winston Guillory, is living at home and coaching basketball. Overall, [place] is doing well. Her dog, \"Naz,\" is a terrier \"and terrorist.. Мария Brittle \" that helps keep her company. She had a cardiac catheterization on 2008, that showed mild plaque disease. She has had no cardiac catheterization since that time. She denies any chest pain, chest discomfort or any unusual shortness of breath. No PND, orthopnea or pedal edema. Denies any syncope or near syncope, lightheadedness or dizziness. She denies any palpitations. She really has no complaints as I see her today. She still drives in town on a very limited basis. She has had no hospitalizations or procedures. Her memory is somewhat impaired. (For example, she could not remember why she ever liked seeing Dr. Colten Bains. ..)    CARDIAC RISK FACTORS:  Hypertension:  Positive. Hyperlipidemia:  Positive. Diabetes:  Positive. Peripheral Vascular Disease:  Negative. Smoking:  Negative.   Other Family Members: Negative. MEDICATIONS AT HOME:  She is currently on aspirin 81 mg daily, Prevacid 30 mg daily, Tradjenta 5 mg daily, lisinopril 10 mg daily, Mevacor 40 mg daily, Zofran p.r.n., Paxil 20 mg daily, Dyazide 37.5/25 daily. PAST MEDICAL AND SURGICAL HISTORY:  1.  Non-insulin-dependent diabetes, under excellent control. Hemoglobin A1c of 6.1.  2.  Colon cancer in 10/2002 with hemicolectomy, no reoccurrence. 3.  Left knee surgery in .  4.  Hypertension, very labile but it was good in my office today. 5.  Hyperlipidemia. 6.  Chronic renal insufficiency, slightly higher than baseline, with creatinine in the 2 range. 7.  Bilateral knee surgery. 8.  Negative for CAD. SOCIAL HISTORY:  She is 80years old. Three children and three stepchildren in [place]. Her , Rubi Sherman,  in 2021 after being in [facility]. She is staying home alone although her son, Hilton Greco, and daughter-in-law, Elmer George, help her out, bringing food. She has another daughter-in-law who does not associate with the family. She does not smoke or drink alcohol. REVIEW OF SYSTEMS:  Cardiac as above. Other systems reviewed including constitutional, eyes, ears, nose and throat, cardiovascular, respiratory, GI, , musculoskeletal, integumentary, neurologic, endocrine, hematologic and allergic/immunologic are negative except for what is described above. No weight loss or weight gain. No change in bowel habits. No blood in stool. No fevers, sweats or chills. PHYSICAL EXAMINATION:  VITAL SIGNS:  Her blood pressure was 120/70 with a heart rate of 110 and regular. Respiratory rate 18. O2 sat 97%. Weight 163 pounds. GENERAL:  She is a pleasant 58-year-old female. Denied pain. She was oriented to person, place, and time. Answered questions appropriately. SKIN:  No unusual skin changes. HEENT:  The pupils are equally round and intact. Mucous membranes were dry. NECK:  No JVD. Good carotid pulses.   No carotid bruits. No lymphadenopathy or thyromegaly. CARDIOVASCULAR EXAM:  S1 and S2 were normal.  No S3 or S4. Soft systolic blowing type murmur. No diastolic murmur. PMI was normal.  No lift, thrust, or pericardial friction rub. LUNGS:  Clear to auscultation and percussion. ABDOMEN:  Soft and nontender. Good bowel sounds. EXTREMITIES:  Good femoral pulses. Good pedal pulses. No pedal edema. Skin was warm and dry. No calf tenderness. Nail beds pink. Good cap refill. PULSES:  Bilateral symmetrical radial, brachial and carotid pulses. No carotid bruits. Good femoral and pedal pulses. NEUROLOGIC EXAM:  Within normal limits. PSYCHIATRIC EXAM:  Within normal limits. LABORATORY DATA:  Sodium was 140, potassium 3.7, BUN 28, creatinine 2.03 with a GFR of 23, which is slightly lower than it has been previously. Her magnesium 1.8, calcium 9.9. Cholesterol 184 with an HDL of 69, LDL 85, triglycerides 149. ALT was 13, AST was 17. Hemoglobin A1c was 6.1. TSH 1.28. Vitamin D 35.6. White count 5.7, hemoglobin 13.4 with a platelet count of 589,480. Her EKG showed normal sinus rhythm with PVCs, otherwise unchanged. Chest x-ray was unremarkable. IMPRESSION:  1. Hypertension, well controlled. 2.  Non-insulin-dependent diabetes, under excellent control. Hemoglobin A1c of 6.1.  3.  Hyperlipidemia, under good control. 4.  Catheterization showed mild plaque disease on 02/20/2008. 5.  Chronic renal insufficiency with a creatinine slightly higher than usual at 2.03, usually in the 1.6 to 1.8 range. 6.  Hemicolectomy secondary to colon cancer in 2002 with no reoccurrence. PLAN:  1. No change in medications. 2.  See in 6 months. DISCUSSION:  [place] continues to do well. She stays at home now alone after the passing of her , Berto Hdez, in 09/2021. However, she is doing well with the help of her son, Wing Holstein. I made no change in medications. I will plan on seeing her in 6 months.   If she would develop any new issues, I would be glad to see her earlier. Thank you very much for allowing me the privilege of seeing Mrs. Crawford S [facility]. She is a delight to have as a patient and I look forward to seeing her in 6 months.     Sincerely,        Brigid Burgess    D: 03/07/2022 10:23:45     T: 03/07/2022 10:26:53     [email or web address]  Job#: [id number]   Doc#: [id number]

## 2022-03-07 NOTE — PROGRESS NOTES
Ov Dr Allie Alexander 1 year follow up   No chest pain or sob  No ankle edema   Merlin passed away in September  Was  43 yrs. No hospitalizations or procedures  Since seen. .   Son Jamaal Merlos and daughter in law Chloé Oris, helping out brings in food All the time. 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 .  Dog, Ana Paula Goodwin is a Terier helps keep her company.

## 2022-03-08 NOTE — PROGRESS NOTES
Juaquin Davis M.D. [address] [place], [place] [address]  [phone]        2022        Joe Pacheco MD  [address] [place] [address]    RE:   Viktoria Marquez  :  10/20/1931    Dear Dr. Rogelio Brand:    [address]:  1.  Mild coronary artery disease, status post cardiac catheterization on 2008, that showed mild plaque disease. 2.  Chronic renal insufficiency, with creatinine slightly elevated in the 2 range, normally in the 1.6 to 1.8. HISTORY OF PRESENT ILLNESS:  I had the pleasure of seeing Mrs. Isabelle Cheema in the office on 2022. She is a very pleasant, sharp 25-year-old female who is now staying alone. Her  passed away in 2021 after being in [facility]. She was  for 42 years. Her son, Domenica Tolentino, and daughter-in-law, Patricia Barrera, help bringing out food every week. Ric Quiñonez also takes care of household needs. Her granddaughter, Germain Jean, is living at home and coaching basketball. Overall, [place] is doing well. Her dog, \"Naz,\" is a terrier \"and terrorist.. Jael Kid \" that helps keep her company. She had a cardiac catheterization on 2008, that showed mild plaque disease. She has had no cardiac catheterization since that time. She denies any chest pain, chest discomfort or any unusual shortness of breath. No PND, orthopnea or pedal edema. Denies any syncope or near syncope, lightheadedness or dizziness. She denies any palpitations. She really has no complaints as I see her today. She still drives in town on a very limited basis. She has had no hospitalizations or procedures. Her memory is somewhat impaired. (For example, she could not remember why she ever liked seeing Dr. Rogelio Brand. ..)    CARDIAC RISK FACTORS:  Hypertension:  Positive. Hyperlipidemia:  Positive. Diabetes:  Positive. Peripheral Vascular Disease:  Negative. Smoking:  Negative.   Other Family Members: Negative. MEDICATIONS AT HOME:  She is currently on aspirin 81 mg daily, Prevacid 30 mg daily, Tradjenta 5 mg daily, lisinopril 10 mg daily, Mevacor 40 mg daily, Zofran p.r.n., Paxil 20 mg daily, Dyazide 37.5/25 daily. PAST MEDICAL AND SURGICAL HISTORY:  1.  Non-insulin-dependent diabetes, under excellent control. Hemoglobin A1c of 6.1.  2.  Colon cancer in 10/2002 with hemicolectomy, no reoccurrence. 3.  Left knee surgery in .  4.  Hypertension, very labile but it was good in my office today. 5.  Hyperlipidemia. 6.  Chronic renal insufficiency, slightly higher than baseline, with creatinine in the 2 range. 7.  Bilateral knee surgery. 8.  Negative for CAD. SOCIAL HISTORY:  She is 80years old. Three children and three stepchildren in [place]. Her , Jesse Huff,  in 2021 after being in [facility]. She is staying home alone although her son, Salina Banegas, and daughter-in-law, Ming Almaguer, help her out, bringing food. She has another daughter-in-law who does not associate with the family. She does not smoke or drink alcohol. REVIEW OF SYSTEMS:  Cardiac as above. Other systems reviewed including constitutional, eyes, ears, nose and throat, cardiovascular, respiratory, GI, , musculoskeletal, integumentary, neurologic, endocrine, hematologic and allergic/immunologic are negative except for what is described above. No weight loss or weight gain. No change in bowel habits. No blood in stool. No fevers, sweats or chills. PHYSICAL EXAMINATION:  VITAL SIGNS:  Her blood pressure was 120/70 with a heart rate of 110 and regular. Respiratory rate 18. O2 sat 97%. Weight 163 pounds. GENERAL:  She is a pleasant 80-year-old female. Denied pain. She was oriented to person, place, and time. Answered questions appropriately. SKIN:  No unusual skin changes. HEENT:  The pupils are equally round and intact. Mucous membranes were dry. NECK:  No JVD. Good carotid pulses.   No carotid bruits. No lymphadenopathy or thyromegaly. CARDIOVASCULAR EXAM:  S1 and S2 were normal.  No S3 or S4. Soft systolic blowing type murmur. No diastolic murmur. PMI was normal.  No lift, thrust, or pericardial friction rub. LUNGS:  Clear to auscultation and percussion. ABDOMEN:  Soft and nontender. Good bowel sounds. EXTREMITIES:  Good femoral pulses. Good pedal pulses. No pedal edema. Skin was warm and dry. No calf tenderness. Nail beds pink. Good cap refill. PULSES:  Bilateral symmetrical radial, brachial and carotid pulses. No carotid bruits. Good femoral and pedal pulses. NEUROLOGIC EXAM:  Within normal limits. PSYCHIATRIC EXAM:  Within normal limits. LABORATORY DATA:  Sodium was 140, potassium 3.7, BUN 28, creatinine 2.03 with a GFR of 23, which is slightly lower than it has been previously. Her magnesium 1.8, calcium 9.9. Cholesterol 184 with an HDL of 69, LDL 85, triglycerides 149. ALT was 13, AST was 17. Hemoglobin A1c was 6.1. TSH 1.28. Vitamin D 35.6. White count 5.7, hemoglobin 13.4 with a platelet count of 169,689. Her EKG showed normal sinus rhythm with PVCs, otherwise unchanged. Chest x-ray was unremarkable. IMPRESSION:  1. Hypertension, well controlled. 2.  Non-insulin-dependent diabetes, under excellent control. Hemoglobin A1c of 6.1.  3.  Hyperlipidemia, under good control. 4.  Catheterization showed mild plaque disease on 02/20/2008. 5.  Chronic renal insufficiency with a creatinine slightly higher than usual at 2.03, usually in the 1.6 to 1.8 range. 6.  Hemicolectomy secondary to colon cancer in 2002 with no reoccurrence. PLAN:  1. No change in medications. 2.  See in 6 months. DISCUSSION:  [place] continues to do well. She stays at home now alone after the passing of her , Kelvin Akhtar, in 09/2021. However, she is doing well with the help of her son, Nick Bagley. I made no change in medications. I will plan on seeing her in 6 months.   If she would develop any new issues, I would be glad to see her earlier. Thank you very much for allowing me the privilege of seeing Mrs. German Meckel. She is a delight to have as a patient and I look forward to seeing her in 6 months.     Sincerely,        Samuel Batista    D: 03/07/2022 10:23:45     T: 03/07/2022 10:26:53     [email or web address]  Job#: [id number]   Doc#: [id number]

## 2022-03-22 ENCOUNTER — OFFICE VISIT (OUTPATIENT)
Dept: FAMILY MEDICINE CLINIC | Age: 87
End: 2022-03-22
Payer: MEDICARE

## 2022-03-22 VITALS
DIASTOLIC BLOOD PRESSURE: 85 MMHG | WEIGHT: 166 LBS | BODY MASS INDEX: 29.41 KG/M2 | HEIGHT: 63 IN | SYSTOLIC BLOOD PRESSURE: 136 MMHG | HEART RATE: 96 BPM

## 2022-03-22 DIAGNOSIS — Z00.00 MEDICARE ANNUAL WELLNESS VISIT, SUBSEQUENT: Primary | ICD-10-CM

## 2022-03-22 PROCEDURE — 1123F ACP DISCUSS/DSCN MKR DOCD: CPT | Performed by: INTERNAL MEDICINE

## 2022-03-22 PROCEDURE — G8482 FLU IMMUNIZE ORDER/ADMIN: HCPCS | Performed by: INTERNAL MEDICINE

## 2022-03-22 PROCEDURE — 4040F PNEUMOC VAC/ADMIN/RCVD: CPT | Performed by: INTERNAL MEDICINE

## 2022-03-22 PROCEDURE — G0439 PPPS, SUBSEQ VISIT: HCPCS | Performed by: INTERNAL MEDICINE

## 2022-03-22 ASSESSMENT — PATIENT HEALTH QUESTIONNAIRE - PHQ9
2. FEELING DOWN, DEPRESSED OR HOPELESS: 0
1. LITTLE INTEREST OR PLEASURE IN DOING THINGS: 0
SUM OF ALL RESPONSES TO PHQ9 QUESTIONS 1 & 2: 0
SUM OF ALL RESPONSES TO PHQ QUESTIONS 1-9: 0

## 2022-03-22 ASSESSMENT — LIFESTYLE VARIABLES: HOW OFTEN DO YOU HAVE A DRINK CONTAINING ALCOHOL: NEVER

## 2022-03-22 NOTE — PATIENT INSTRUCTIONS
Survey: You may be receiving a survey from [employer] regarding your visit today. You may get this in the mail, through your MyChart or in your email. Please complete the survey to enable us to provide the highest quality of care to you and your family. Please also, mention our names. If you cannot score us as very good (5 Stars) on any question, please feel free to call the office to discuss how we could have made your experience exceptional.      Thank You! MD Vladimir Street, Meggan Ledesma, BSN RN    Mary العراقي        Personalized Preventive Plan for Crystal Mcnair - 3/22/2022  Medicare offers a range of preventive health benefits. Some of the tests and screenings are paid in full while other may be subject to a deductible, co-insurance, and/or copay. Some of these benefits include a comprehensive review of your medical history including lifestyle, illnesses that may run in your family, and various assessments and screenings as appropriate. After reviewing your medical record and screening and assessments performed today your provider may have ordered immunizations, labs, imaging, and/or referrals for you. A list of these orders (if applicable) as well as your Preventive Care list are included within your After Visit Summary for your review. Other Preventive Recommendations:    · A preventive eye exam performed by an eye specialist is recommended every 1-2 years to screen for glaucoma; cataracts, macular degeneration, and other eye disorders. · A preventive dental visit is recommended every 6 months. · Try to get at least 150 minutes of exercise per week or 10,000 steps per day on a pedometer . · Order or download the FREE \"Exercise & Physical Activity: Your Everyday Guide\" from [employer]. Call 5-859.408.4116 or search [employer] online. · You need 8995-7983 mg of calcium and 9223-8162 IU of vitamin D per day.  It is possible to meet your calcium requirement with diet alone, but a vitamin D supplement is usually necessary to meet this goal.  · When exposed to the sun, use a sunscreen that protects against both UVA and UVB radiation with an SPF of 30 or greater. Reapply every 2 to 3 hours or after sweating, drying off with a towel, or swimming. · Always wear a seat belt when traveling in a car. Always wear a helmet when riding a bicycle or motorcycle.

## 2022-03-22 NOTE — PROGRESS NOTES
Medicare Annual Wellness Visit    Ann Talley is here for Medicare AWV and Health Maintenance (items reviewed)    Assessment & Plan        Diagnosis Orders   1. Medicare annual wellness visit, subsequent         Recommended screening schedule for the next 5-10 years is provided to the patient in written form: see Patient Instructions/AVS.     Return in about 1 year (around 3/22/2023) for AWV. Subjective   The following acute and/or chronic problems were also addressed today:    Patient's complete Health Risk Assessment and screening values have been reviewed and are found in Flowsheets. The following problems were reviewed today and where indicated follow up appointments were made and/or referrals ordered.     Positive Risk Factor Screenings with Interventions:             General Health and ACP:  General  In general, how would you say your health is?: Good  In the past 7 days, have you experienced any of the following: New or Increased Pain, New or Increased Fatigue, Loneliness, Social Isolation, Stress or Anger?: No  Do you get the social and emotional support that you need?: Yes  Do you have a Living Will?: Yes    Advance Directives     Power of  Living Will ACP-Advance Directive ACP-Power of     Not on File Not on File Not on File Not on File      General Health Risk Interventions:  · no concerns at this time    Health Habits/Nutrition:     Physical Activity: Inactive    Days of Exercise per Week: 0 days    Minutes of Exercise per Session: 0 min     Have you lost any weight without trying in the past 3 months?: No  Body mass index: (!) 29.4  Have you seen the dentist within the past year?: N/A - wear dentures    Health Habits/Nutrition Interventions:  · Inadequate physical activity:  patient encouraged to increase activity with family members for safety    Hearing/Vision:  Do you or your family notice any trouble with your hearing that hasn't been managed with hearing aids?: No  Do you have difficulty driving, watching TV, or doing any of your daily activities because of your eyesight?: No  Have you had an eye exam within the past year?: (!) No  No exam data present    Hearing/Vision Interventions:  Hearing concerns:  regularly follows with hearing specialist for her hearing aids. Last visit 3-4 months ago            Objective   Vitals:    03/22/22 1351   BP: 136/85   Site: Right Upper Arm   Position: Sitting   Cuff Size: Medium Adult   Pulse: 96   Weight: 166 lb (75.3 kg)   Height: 5' 3\" (1.6 m)      Body mass index is 29.41 kg/m². No Known Allergies  Prior to Visit Medications    Medication Sig Taking? Authorizing Provider   linagliptin (TRADJENTA) 5 MG tablet Take 1 tablet by mouth daily  Bryson Ardon MD   PARoxetine (PAXIL) 20 MG tablet TAKE 1 TABLET BY MOUTH EVERY DAY IN THE MORNING  Bryson Ardon MD   lansoprazole (PREVACID) 30 MG delayed release capsule TAKE 1 CAPSULE BY MOUTH EVERY DAY  Bryson Ardon MD   triamterene-hydroCHLOROthiazide (DYAZIDE) 37.5-25 MG per capsule TAKE 1 CAPSULE BY MOUTH EVERY DAY  Bryson Ardon MD   lovastatin (MEVACOR) 40 MG tablet TAKE 1 TABLET BY MOUTH NIGHTLY  Bryson Ardon MD   lisinopril (PRINIVIL;ZESTRIL) 10 MG tablet TAKE 1 TABLET BY MOUTH EVERY DAY  Bryson Ardon MD   PARoxetine (PAXIL) 20 MG tablet TAKE 1 TABLET BY MOUTH EVERY DAY IN THE MORNING  Bryson Ardon MD   ondansetron (ZOFRAN ODT) 4 MG disintegrating tablet Take 1 tablet by mouth every 8 hours as needed for Nausea or Vomiting  Pedro Luis Hall MD   FIBER PO Take 1 tablet by mouth daily  Historical Provider, MD   aspirin 81 MG tablet Take 81 mg by mouth 2 times daily   Historical Provider, MD   Cholecalciferol (VITAMIN D) 2000 UNITS CAPS capsule Take 1 capsule by mouth daily.   Tatianna Romero MD       [facility] (Including outside providers/suppliers regularly involved in providing care):   Patient Care Team:  Tatianna Romero MD as PCP - General (Internal Medicine)  Tatianna Romero MD as PCP - [facility]

## 2022-03-29 ENCOUNTER — OFFICE VISIT (OUTPATIENT)
Dept: FAMILY MEDICINE CLINIC | Age: 87
End: 2022-03-29
Payer: MEDICARE

## 2022-03-29 VITALS
DIASTOLIC BLOOD PRESSURE: 89 MMHG | BODY MASS INDEX: 29.23 KG/M2 | HEIGHT: 63 IN | WEIGHT: 165 LBS | HEART RATE: 98 BPM | SYSTOLIC BLOOD PRESSURE: 136 MMHG

## 2022-03-29 DIAGNOSIS — F32.5 MAJOR DEPRESSIVE DISORDER WITH SINGLE EPISODE, IN FULL REMISSION (HCC): ICD-10-CM

## 2022-03-29 DIAGNOSIS — N18.2 CONTROLLED TYPE 2 DIABETES MELLITUS WITH STAGE 2 CHRONIC KIDNEY DISEASE, WITHOUT LONG-TERM CURRENT USE OF INSULIN (HCC): ICD-10-CM

## 2022-03-29 DIAGNOSIS — N18.4 CHRONIC KIDNEY DISEASE, STAGE IV (SEVERE) (HCC): Primary | ICD-10-CM

## 2022-03-29 DIAGNOSIS — M72.0 DUPUYTREN'S CONTRACTURE OF RIGHT HAND: ICD-10-CM

## 2022-03-29 DIAGNOSIS — E78.2 MIXED HYPERCHOLESTEROLEMIA AND HYPERTRIGLYCERIDEMIA: ICD-10-CM

## 2022-03-29 DIAGNOSIS — E55.9 VITAMIN D DEFICIENCY: ICD-10-CM

## 2022-03-29 DIAGNOSIS — E11.22 CONTROLLED TYPE 2 DIABETES MELLITUS WITH STAGE 2 CHRONIC KIDNEY DISEASE, WITHOUT LONG-TERM CURRENT USE OF INSULIN (HCC): ICD-10-CM

## 2022-03-29 DIAGNOSIS — C18.9 MALIGNANT NEOPLASM OF COLON, UNSPECIFIED PART OF COLON (HCC): ICD-10-CM

## 2022-03-29 PROBLEM — N18.32 CHRONIC RENAL IMPAIRMENT, STAGE 3B (HCC): Status: RESOLVED | Noted: 2022-03-29 | Resolved: 2022-03-29

## 2022-03-29 PROBLEM — N18.32 CHRONIC RENAL IMPAIRMENT, STAGE 3B (HCC): Status: ACTIVE | Noted: 2022-03-29

## 2022-03-29 PROCEDURE — G8427 DOCREV CUR MEDS BY ELIG CLIN: HCPCS | Performed by: INTERNAL MEDICINE

## 2022-03-29 PROCEDURE — 3044F HG A1C LEVEL LT 7.0%: CPT | Performed by: INTERNAL MEDICINE

## 2022-03-29 PROCEDURE — 1123F ACP DISCUSS/DSCN MKR DOCD: CPT | Performed by: INTERNAL MEDICINE

## 2022-03-29 PROCEDURE — 1036F TOBACCO NON-USER: CPT | Performed by: INTERNAL MEDICINE

## 2022-03-29 PROCEDURE — 1090F PRES/ABSN URINE INCON ASSESS: CPT | Performed by: INTERNAL MEDICINE

## 2022-03-29 PROCEDURE — G8417 CALC BMI ABV UP PARAM F/U: HCPCS | Performed by: INTERNAL MEDICINE

## 2022-03-29 PROCEDURE — 99214 OFFICE O/P EST MOD 30 MIN: CPT | Performed by: INTERNAL MEDICINE

## 2022-03-29 PROCEDURE — 4040F PNEUMOC VAC/ADMIN/RCVD: CPT | Performed by: INTERNAL MEDICINE

## 2022-03-29 PROCEDURE — G8482 FLU IMMUNIZE ORDER/ADMIN: HCPCS | Performed by: INTERNAL MEDICINE

## 2022-03-29 ASSESSMENT — ENCOUNTER SYMPTOMS
NAUSEA: 0
DIARRHEA: 0
CONSTIPATION: 0
SORE THROAT: 0
COUGH: 0
RHINORRHEA: 0
CHEST TIGHTNESS: 0
SHORTNESS OF BREATH: 0
BLOOD IN STOOL: 0
ABDOMINAL PAIN: 0

## 2022-03-29 NOTE — PROGRESS NOTES
[address] (:  10/20/1931) is a 80 y.o. female,Established patient, here for evaluation of the following chief complaint(s):  Diabetes (6 month check up), Hypertension, Hyperlipidemia, Mental Health Problem, and Gastroesophageal Reflux         ASSESSMENT/PLAN:  1. Chronic kidney disease, stage IV (severe) ([address]  -     External Referral To Nephrology  -     US RETROPERITONEAL LIMITED; Future  2. Major depressive disorder with single episode, in full remission ([address]  3. Malignant neoplasm of colon, unspecified part of colon ([address]  4. Controlled type 2 diabetes mellitus with stage 2 chronic kidney disease, without long-term current use of insulin ([address]  5. Vitamin D deficiency  6. Mixed hypercholesterolemia and hypertriglyceridemia  7. Dupuytren's contracture of right hand      Plan:  1. Chronic kidney disease, stage IV (severe) ([address]  With a jump in creatinine I discussed having [place] seen Nephrology. She is in agreement to this. Will get an US of the Kidneys to rule out underlying pathology. - External Referral To Nephrology    2. Major depressive disorder with single episode, in full remission ([address]  Controlled on Paxil. Discussed changing to lexapro but [place] requests to keep it where it is since she is doing so well. No change in medication. 3. Malignant neoplasm of colon, unspecified part of colon ([address]  S/P colon resection. 4. Controlled type 2 diabetes mellitus with stage 2 chronic kidney disease, without long-term current use of insulin (HCC)  Controlled on Tradjenta and diet modification. No change to medication. 5. Vitamin D deficiency  Controlled on Vitamin D replacement. 6. Mixed hypercholesterolemia and hypertriglyceridemia  Controlled on Mevacor. No change in medication. Dr. David Morales follows labs. 7. Dupuytren's contracture of right hand  Discussed surgical option but she does not want to pursue at this time.       [place] was instructed to follow up in the clinic in 6 months for check up or as needed with any medical issues. Subjective   SUBJECTIVE/OBJECTIVE:  [place] presents for a check up on her medical conditions HTN, DM II, hyperlipidemia, depression. [place] denies new problems. Medications were reviewed with [place], she is  tolerating the medication. Bowels are regular. There has not been rectal bleeding. [place] denies urinary complications, the urine stream is good. [place] denies chest pain and denies increasing shortness of breath. Labs from 3/22 reviewed. Past Medical History:  No date: Cancer [facility])      Comment:  colon  10/02: Colon cancer ([facility]  No date: Diabetes mellitus ([facility]  No date: Esophageal reflux  No date: Heart disease  No date: Hypercholesterolemia  No date: Hypertension  No date: Microalbuminuria  No date: Osteoarthrosis  No date: Osteoporosis    Past Surgical History:  , 12: COLONOSCOPY  07/10/2017: COLONOSCOPY      Comment:  Dr. Feliz Macias:  3 adenomatous polyps. 08: DIAGNOSTIC CARDIAC CATH LAB PROCEDURE  10/02: HEMICOLECTOMY  No date: HYSTERECTOMY  1973: KNEE SURGERY  7/10/2017: DC COLON CA SCRN NOT HI RSK IND; N/A      Comment:  COLONOSCOPY performed by Darin Smith MD at [address] History    Socioeconomic History      Marital status:        Spouse name: Not on file      Number of children: Not on file      Years of education: Not on file      Highest education level: Not on file    Occupational History        Employer: UNEMPLOYED    Tobacco Use      Smoking status: Former Smoker        Quit date: 10/11/1977        Years since quittin.4      Smokeless tobacco: Never Used    Substance and Sexual Activity      Alcohol use: No      Drug use: Not on file      Sexual activity: Not on file    Other Topics      Concerns:        Not on file    Social History Narrative      Not on file    Social Determinants of Health  Financial Resource Strain: Low Risk       Difficulty of Paying Living Expenses: Not very hard  Food Insecurity: No Food Insecurity      Worried About [address] [place] [address] in the Last Year: Never true      Ran Out of Food in the Last Year: Never true  Transportation Needs:       Lack of Transportation (Medical): Not on file      Lack of Transportation (Non-Medical):  Not on file  Physical Activity: Inactive      Days of Exercise per Week: 0 days      Minutes of Exercise per Session: 0 min  Stress:       Feeling of Stress : Not on file  Social Connections:       Frequency of Communication with Friends and Family: Not on file      Frequency of Social Gatherings with Friends and Family: Not on file      Attends Synagogue Services: Not on file      Active Member of [address] [place] [address] or Organizations: Not on file      Attends Club or Organization Meetings: Not on file      Marital Status: Not on file  Intimate Partner Violence:       Fear of Current or Ex-Partner: Not on file      Emotionally Abused: Not on file      Physically Abused: Not on file      Sexually Abused: Not on file  Housing Stability:       Unable to Pay for Housing in the Last Year: Not on file      Number of Places Lived in the Last Year: Not on file      Unstable Housing in the Last Year: Not on file    Review of patient's family history indicates:  Problem: Heart Attack      Relation: Brother          Age of Onset: (Not Specified)      Current Outpatient Medications on File Prior to Visit:  linagliptin (TRADJENTA) 5 MG tablet, Take 1 tablet by mouth daily, Disp: 14 tablet, Rfl: 0  PARoxetine (PAXIL) 20 MG tablet, TAKE 1 TABLET BY MOUTH EVERY DAY IN THE MORNING, Disp: 30 tablet, Rfl: 5  lansoprazole (PREVACID) 30 MG delayed release capsule, TAKE 1 CAPSULE BY MOUTH EVERY DAY, Disp: 30 capsule, Rfl: 5  triamterene-hydroCHLOROthiazide (DYAZIDE) 37.5-25 MG per capsule, TAKE 1 CAPSULE BY MOUTH EVERY DAY, Disp: 30 capsule, Rfl: 5  lovastatin (MEVACOR) 40 MG tablet, TAKE 1 TABLET BY MOUTH NIGHTLY, Disp: 60 tablet, Rfl: 5  lisinopril (PRINIVIL;ZESTRIL) 10 MG tablet, TAKE 1 TABLET BY MOUTH EVERY DAY, Disp: 30 tablet, Rfl: 5  FIBER PO, Take 1 tablet by mouth daily, Disp: , Rfl:   aspirin 81 MG tablet, Take 81 mg by mouth 2 times daily , Disp: , Rfl:   (DISCONTINUED) PARoxetine (PAXIL) 20 MG tablet, TAKE 1 TABLET BY MOUTH EVERY DAY IN THE MORNING, Disp: 30 tablet, Rfl: 5  ondansetron (ZOFRAN ODT) 4 MG disintegrating tablet, Take 1 tablet by mouth every 8 hours as needed for Nausea or Vomiting, Disp: 10 tablet, Rfl: 0  Cholecalciferol (VITAMIN D) 2000 UNITS CAPS capsule, Take 1 capsule by mouth daily. , Disp: 30 capsule, Rfl: 11    No current facility-administered medications on file prior to visit.       No Known Allergies      Lab Results       Component                Value               Date                       NA                       140                 03/04/2022                 K                        3.7                 03/04/2022                 CL                       99                  03/04/2022                 CO2                      29                  03/04/2022                 BUN                      28 (H)              03/04/2022                 CREATININE               2.03 (H)            03/04/2022                 GLUCOSE                  113 (H)             03/04/2022                 CALCIUM                  9.9                 03/04/2022                 PROT                     7.7                 03/04/2022                 LABALBU                  4.6                 03/04/2022                 BILITOT                  0.42                03/04/2022                 ALKPHOS                  120 (H)             03/04/2022                 AST                      17                  03/04/2022                 ALT                      13                  03/04/2022                 LABGLOM                  23 (L)              03/04/2022                 GFRAA                    28 (L)              03/04/2022              Lab Results       Component                Value               Date                       LABA1C                   6.1 (H)             03/04/2022            Lab Results       Component                Value               Date                       EAG                      128                 03/04/2022              Lab Results       Component                Value               Date                       CHOL                     184                 03/04/2022                 CHOL                     170                 03/02/2021                 CHOL                     182                 03/09/2020            Lab Results       Component                Value               Date                       TRIG                     149                 03/04/2022                 TRIG                     174 (H)             03/02/2021                 TRIG                     185 (H)             03/09/2020            Lab Results       Component                Value               Date                       HDL                      69                  03/04/2022                 HDL                      68                  03/02/2021                 HDL                      62                  03/09/2020            Lab Results       Component                Value               Date                       LDLCHOLESTEROL           85                  03/04/2022                 LDLCHOLESTEROL           67                  03/02/2021                 LDLCHOLESTEROL           83                  03/09/2020            Lab Results       Component                Value               Date                       VLDL                                         03/02/2021             NOT REPORTED (H)       VLDL                                         03/09/2020             NOT REPORTED (H)       VLDL                                         03/20/2019             NOT REPORTED (H)  Lab Results       Component                Value               Date CHOLHDLRATIO             2.7                 03/04/2022                 CHOLHDLRATIO             2.5                 03/02/2021                 CHOLHDLRATIO             2.9                 03/09/2020                              Diabetes  She presents for her follow-up diabetic visit. She has type 2 diabetes mellitus. Her disease course has been stable. Pertinent negatives for diabetes include no chest pain. There are no hypoglycemic complications. Symptoms are stable. Current diabetic treatment includes oral agent (monotherapy). She is compliant with treatment all of the time. Her weight is stable. She is following a diabetic diet. She never participates in exercise. There is no change in her home blood glucose trend. An ACE inhibitor/angiotensin II receptor blocker is being taken. Eye exam is not current. Hypertension  This is a chronic problem. The current episode started more than 1 year ago. The problem is unchanged. The problem is controlled. Pertinent negatives include no chest pain, neck pain, palpitations or shortness of breath. Past treatments include ACE inhibitors and diuretics. The current treatment provides significant improvement. There are no compliance problems. There is no history of angina. Hyperlipidemia  This is a chronic problem. The current episode started more than 1 year ago. The problem is controlled. Recent lipid tests were reviewed and are normal. Pertinent negatives include no chest pain, myalgias or shortness of breath. Current antihyperlipidemic treatment includes statins. The current treatment provides significant improvement of lipids. Gastroesophageal Reflux  She reports no abdominal pain, no chest pain, no coughing, no nausea or no sore throat. This is a chronic problem. The current episode started more than 1 year ago. The problem occurs rarely. The problem has been unchanged. She has tried a PPI for the symptoms. The treatment provided significant relief. Review of Systems   Constitutional: Negative. HENT: Negative for congestion, ear pain, rhinorrhea, sneezing and sore throat. Eyes: Negative for visual disturbance. Respiratory: Negative for cough, chest tightness and shortness of breath. Cardiovascular: Negative for chest pain and palpitations. Gastrointestinal: Negative for abdominal pain, blood in stool, constipation, diarrhea and nausea. Genitourinary: Negative for difficulty urinating, dysuria, frequency, menstrual problem and urgency. Musculoskeletal: Negative for arthralgias, joint swelling, myalgias and neck pain. Skin: Negative. Neurological: Negative for syncope. Psychiatric/Behavioral: Negative. Objective   Physical Exam  Constitutional:       Appearance: She is well-developed. HENT:      Head: Atraumatic. Eyes:      Conjunctiva/sclera: Conjunctivae normal.   Cardiovascular:      Rate and Rhythm: Normal rate and regular rhythm. Heart sounds: Murmur heard. Pulmonary:      Effort: Pulmonary effort is normal.      Breath sounds: Normal breath sounds. Abdominal:      Palpations: Abdomen is soft. Tenderness: There is no abdominal tenderness. Musculoskeletal:         General: Normal range of motion. Cervical back: Normal range of motion and neck supple. Comments: Contracture right hand. Lymphadenopathy:      Cervical: No cervical adenopathy. Skin:     Findings: No rash. Neurological:      Mental Status: She is alert. Psychiatric:         Behavior: Behavior normal.         Thought Content: Thought content normal.                  An electronic signature was used to authenticate this note.     --Sushant Mejia MD

## 2022-03-29 NOTE — PATIENT INSTRUCTIONS
1. Chronic kidney disease, stage IV (severe) ([facility]  With a jump in creatinine I discussed having [place] seen Nephrology. She is in agreement to this. Will get an US of the Kidneys to rule out underlying pathology. - External Referral To Nephrology    2. Major depressive disorder with single episode, in full remission ([facility]  Controlled on Paxil. Discussed changing to lexapro but [place] requests to keep it where it is since she is doing so well. No change in medication. 3. Malignant neoplasm of colon, unspecified part of colon ([facility]  S/P colon resection. 4. Controlled type 2 diabetes mellitus with stage 2 chronic kidney disease, without long-term current use of insulin (HCC)  Controlled on Tradjenta and diet modification. No change to medication. 5. Vitamin D deficiency  Controlled on Vitamin D replacement. 6. Mixed hypercholesterolemia and hypertriglyceridemia  Controlled on Mevacor. No change in medication. Dr. Jessika Alvarez follows labs. 7. Dupuytren's contracture of right hand  Discussed surgical option but she does not want to pursue at this time. [place] was instructed to follow up in the clinic in 6 months for check up or as needed with any medical issues.

## 2022-03-31 ENCOUNTER — HOSPITAL ENCOUNTER (OUTPATIENT)
Dept: ULTRASOUND IMAGING | Age: 87
Discharge: HOME | End: 2022-04-02
Payer: MEDICARE

## 2022-03-31 DIAGNOSIS — N18.4 CHRONIC KIDNEY DISEASE, STAGE IV (SEVERE) (HCC): ICD-10-CM

## 2022-03-31 PROCEDURE — 76770 US EXAM ABDO BACK WALL COMP: CPT

## 2022-04-07 DIAGNOSIS — K21.00 GASTROESOPHAGEAL REFLUX DISEASE WITH ESOPHAGITIS: ICD-10-CM

## 2022-04-07 DIAGNOSIS — I10 BENIGN ESSENTIAL HTN: ICD-10-CM

## 2022-04-08 RX ORDER — LISINOPRIL 10 MG/1
TABLET ORAL
Qty: 30 TABLET | Refills: 5 | Status: SHIPPED | OUTPATIENT
Start: 2022-04-08 | End: 2022-10-27

## 2022-04-08 RX ORDER — LANSOPRAZOLE 30 MG/1
CAPSULE, DELAYED RELEASE ORAL
Qty: 30 CAPSULE | Refills: 5 | Status: SHIPPED | OUTPATIENT
Start: 2022-04-08

## 2022-04-08 NOTE — TELEPHONE ENCOUNTER
Health Maintenance   Topic Date Due    DTaP/Tdap/Td vaccine (1 - Tdap) Never done    Shingles Vaccine (1 of 2) Never done    Lipid screen  03/04/2023    Potassium monitoring  03/04/2023    Creatinine monitoring  03/04/2023    Depression Monitoring  03/22/2023    Annual Wellness Visit (AWV)  03/23/2023    Flu vaccine  Completed    Pneumococcal 65+ yrs at Risk Vaccine  Completed    COVID-19 Vaccine  Completed    Hepatitis A vaccine  Aged Out    Hib vaccine  Aged Out    Meningococcal (ACWY) vaccine  Aged Out             (applicable per patient's age: Cancer Screenings, Depression Screening, Fall Risk Screening, Immunizations)    Hemoglobin A1C (%)   Date Value   03/04/2022 6.1 (H)   03/02/2021 5.8   03/09/2020 5.7     Microalb/Crt.  Ratio (mcg/mg creat)   Date Value   08/07/2013 132     LDL Cholesterol (mg/dL)   Date Value   03/04/2022 85     AST (U/L)   Date Value   03/04/2022 17     ALT (U/L)   Date Value   03/04/2022 13     BUN (mg/dL)   Date Value   03/04/2022 28 (H)      (goal A1C is < 7)   (goal LDL is <100) need 30-50% reduction from baseline     BP Readings from Last 3 Encounters:   03/29/22 136/89   03/22/22 136/85   03/07/22 120/70    (goal /80)      All Future Testing planned in [facility]:  Lab Frequency Next Occurrence   TSH with Reflex Once 09/07/2022   CBC with Auto Differential Once 09/07/2022   Comprehensive Metabolic Panel Once 69/42/7304   Vitamin D 25 Hydroxy Once 09/07/2022   Lipid Panel Once 09/07/2022   Magnesium Once 09/07/2022   EKG 12 Lead Once 09/07/2022       Next Visit Date:  Future Appointments   Date Time Provider Pradeep Coello   9/15/2022  9:30 AM MD Palmira Roth [facility]   9/29/2022  2:15 PM Tu Hollins MD [facility] [address] [facility]   3/23/2023  1:00 PM PX [address]            Patient Active Problem List:     Vitamin D deficiency     Osteoporosis, senile     Mixed hypercholesterolemia and hypertriglyceridemia History of colon cancer     GERD (gastroesophageal reflux disease)     CAD (coronary artery disease)     Benign essential HTN     Cancer (HCC)     Osteoarthrosis     Colon cancer (HCC)     CRI (chronic renal insufficiency)     Controlled type 2 diabetes mellitus with stage 2 chronic kidney disease, without long-term current use of insulin (HCC)     Major depressive disorder with single episode, in full remission ([facility]     Chronic kidney disease, stage IV (severe) ([address]     Dupuytren's contracture of right hand

## 2022-07-09 DIAGNOSIS — F32.A DEPRESSION: ICD-10-CM

## 2022-07-11 RX ORDER — PAROXETINE HYDROCHLORIDE 20 MG/1
TABLET, FILM COATED ORAL
Qty: 30 TABLET | Refills: 5 | Status: SHIPPED | OUTPATIENT
Start: 2022-07-11

## 2022-07-14 DIAGNOSIS — I10 BENIGN ESSENTIAL HTN: ICD-10-CM

## 2022-07-15 RX ORDER — TRIAMTERENE AND HYDROCHLOROTHIAZIDE 37.5; 25 MG/1; MG/1
CAPSULE ORAL
Qty: 30 CAPSULE | Refills: 5 | Status: SHIPPED | OUTPATIENT
Start: 2022-07-15

## 2022-09-14 ENCOUNTER — HOSPITAL ENCOUNTER (OUTPATIENT)
Age: 87
Discharge: HOME | End: 2022-09-14
Payer: MEDICARE

## 2022-09-14 DIAGNOSIS — I25.10 CORONARY ARTERY DISEASE INVOLVING NATIVE CORONARY ARTERY OF NATIVE HEART WITHOUT ANGINA PECTORIS: ICD-10-CM

## 2022-09-14 DIAGNOSIS — E78.2 MIXED HYPERLIPIDEMIA: ICD-10-CM

## 2022-09-14 DIAGNOSIS — E55.9 VITAMIN D DEFICIENCY: ICD-10-CM

## 2022-09-14 DIAGNOSIS — I10 BENIGN ESSENTIAL HTN: ICD-10-CM

## 2022-09-14 LAB
ABSOLUTE EOS #: 0.1 K/UL (ref 0–0.4)
ABSOLUTE LYMPH #: 2.1 K/UL (ref 1–4.8)
ABSOLUTE MONO #: 0.3 K/UL (ref 0–1)
ALBUMIN SERPL-MCNC: 3.9 G/DL (ref 3.5–5.2)
ALP BLD-CCNC: 130 U/L (ref 35–104)
ALT SERPL-CCNC: <5 U/L (ref 5–33)
ANION GAP SERPL CALCULATED.3IONS-SCNC: 12 MMOL/L (ref 9–17)
AST SERPL-CCNC: 11 U/L
BASOPHILS # BLD: 1 % (ref 0–2)
BASOPHILS ABSOLUTE: 0 K/UL (ref 0–0.2)
BILIRUB SERPL-MCNC: 0.5 MG/DL (ref 0.3–1.2)
BUN BLDV-MCNC: 32 MG/DL (ref 8–23)
BUN/CREAT BLD: 18 (ref 9–20)
CALCIUM SERPL-MCNC: 9.9 MG/DL (ref 8.6–10.4)
CHLORIDE BLD-SCNC: 101 MMOL/L (ref 98–107)
CO2: 23 MMOL/L (ref 20–31)
CREAT SERPL-MCNC: 1.76 MG/DL (ref 0.5–0.9)
DIFFERENTIAL TYPE: YES
EOSINOPHILS RELATIVE PERCENT: 1 % (ref 0–5)
GFR AFRICAN AMERICAN: 33 ML/MIN
GFR NON-AFRICAN AMERICAN: 27 ML/MIN
GFR SERPL CREATININE-BSD FRML MDRD: ABNORMAL ML/MIN/{1.73_M2}
GLUCOSE BLD-MCNC: 96 MG/DL (ref 70–99)
HCT VFR BLD CALC: 36.4 % (ref 36–46)
HEMOGLOBIN: 12 G/DL (ref 12–16)
LYMPHOCYTES # BLD: 36 % (ref 15–40)
MAGNESIUM: 1.8 MG/DL (ref 1.6–2.6)
MCH RBC QN AUTO: 26.9 PG (ref 26–34)
MCHC RBC AUTO-ENTMCNC: 33 G/DL (ref 31–37)
MCV RBC AUTO: 81.5 FL (ref 80–100)
MONOCYTES # BLD: 5 % (ref 4–8)
PATIENT FASTING?: YES
PDW BLD-RTO: 16 % (ref 12.1–15.2)
PLATELET # BLD: 215 K/UL (ref 140–450)
POTASSIUM SERPL-SCNC: 4.5 MMOL/L (ref 3.7–5.3)
RBC # BLD: 4.47 M/UL (ref 4–5.2)
SEG NEUTROPHILS: 57 % (ref 47–75)
SEGMENTED NEUTROPHILS ABSOLUTE COUNT: 3.5 K/UL (ref 2.5–7)
SODIUM BLD-SCNC: 136 MMOL/L (ref 135–144)
TOTAL PROTEIN: 7.4 G/DL (ref 6.4–8.3)
TSH SERPL DL<=0.05 MIU/L-ACNC: 1.49 UIU/ML (ref 0.3–5)
WBC # BLD: 6 K/UL (ref 3.5–11)

## 2022-09-14 PROCEDURE — 83735 ASSAY OF MAGNESIUM: CPT

## 2022-09-14 PROCEDURE — 82306 VITAMIN D 25 HYDROXY: CPT

## 2022-09-14 PROCEDURE — 84443 ASSAY THYROID STIM HORMONE: CPT

## 2022-09-14 PROCEDURE — 93005 ELECTROCARDIOGRAM TRACING: CPT

## 2022-09-14 PROCEDURE — 80053 COMPREHEN METABOLIC PANEL: CPT

## 2022-09-14 PROCEDURE — 36415 COLL VENOUS BLD VENIPUNCTURE: CPT

## 2022-09-14 PROCEDURE — 80061 LIPID PANEL: CPT

## 2022-09-14 PROCEDURE — 85025 COMPLETE CBC W/AUTO DIFF WBC: CPT

## 2022-09-15 ENCOUNTER — OFFICE VISIT (OUTPATIENT)
Dept: CARDIOLOGY CLINIC | Age: 87
End: 2022-09-15
Payer: MEDICARE

## 2022-09-15 VITALS
BODY MASS INDEX: 26.93 KG/M2 | HEART RATE: 91 BPM | OXYGEN SATURATION: 92 % | DIASTOLIC BLOOD PRESSURE: 60 MMHG | WEIGHT: 152 LBS | SYSTOLIC BLOOD PRESSURE: 130 MMHG

## 2022-09-15 DIAGNOSIS — E11.22 CONTROLLED TYPE 2 DIABETES MELLITUS WITH STAGE 2 CHRONIC KIDNEY DISEASE, WITHOUT LONG-TERM CURRENT USE OF INSULIN (HCC): ICD-10-CM

## 2022-09-15 DIAGNOSIS — I10 PRIMARY HYPERTENSION: ICD-10-CM

## 2022-09-15 DIAGNOSIS — E78.2 MIXED HYPERCHOLESTEROLEMIA AND HYPERTRIGLYCERIDEMIA: Primary | ICD-10-CM

## 2022-09-15 DIAGNOSIS — I10 BENIGN ESSENTIAL HTN: ICD-10-CM

## 2022-09-15 DIAGNOSIS — N18.4 CHRONIC KIDNEY DISEASE, STAGE IV (SEVERE) (HCC): ICD-10-CM

## 2022-09-15 DIAGNOSIS — N18.2 CONTROLLED TYPE 2 DIABETES MELLITUS WITH STAGE 2 CHRONIC KIDNEY DISEASE, WITHOUT LONG-TERM CURRENT USE OF INSULIN (HCC): ICD-10-CM

## 2022-09-15 DIAGNOSIS — I25.10 CORONARY ARTERY DISEASE INVOLVING NATIVE CORONARY ARTERY OF NATIVE HEART WITHOUT ANGINA PECTORIS: ICD-10-CM

## 2022-09-15 LAB
CHOLESTEROL/HDL RATIO: 3.2
CHOLESTEROL: 173 MG/DL
EKG ATRIAL RATE: 73 BPM
EKG P AXIS: 45 DEGREES
EKG P-R INTERVAL: 180 MS
EKG Q-T INTERVAL: 334 MS
EKG QRS DURATION: 64 MS
EKG QTC CALCULATION (BAZETT): 367 MS
EKG R AXIS: 66 DEGREES
EKG T AXIS: 100 DEGREES
EKG VENTRICULAR RATE: 73 BPM
HDLC SERPL-MCNC: 54 MG/DL
LDL CHOLESTEROL: 90 MG/DL (ref 0–130)
TRIGL SERPL-MCNC: 145 MG/DL
VITAMIN D 25-HYDROXY: 43.1 NG/ML

## 2022-09-15 PROCEDURE — G8417 CALC BMI ABV UP PARAM F/U: HCPCS | Performed by: INTERNAL MEDICINE

## 2022-09-15 PROCEDURE — 3044F HG A1C LEVEL LT 7.0%: CPT | Performed by: INTERNAL MEDICINE

## 2022-09-15 PROCEDURE — 93010 ELECTROCARDIOGRAM REPORT: CPT | Performed by: INTERNAL MEDICINE

## 2022-09-15 PROCEDURE — 1036F TOBACCO NON-USER: CPT | Performed by: INTERNAL MEDICINE

## 2022-09-15 PROCEDURE — 99214 OFFICE O/P EST MOD 30 MIN: CPT | Performed by: INTERNAL MEDICINE

## 2022-09-15 PROCEDURE — G8428 CUR MEDS NOT DOCUMENT: HCPCS | Performed by: INTERNAL MEDICINE

## 2022-09-15 PROCEDURE — 1090F PRES/ABSN URINE INCON ASSESS: CPT | Performed by: INTERNAL MEDICINE

## 2022-09-15 PROCEDURE — 1123F ACP DISCUSS/DSCN MKR DOCD: CPT | Performed by: INTERNAL MEDICINE

## 2022-09-15 NOTE — LETTER
lisinopril 10 mg daily, Mevacor 40 mg daily, Paxil 20 mg daily, and Dyazide 37.5/25 daily. PAST MEDICAL AND SURGICAL HISTORY:  1.  Non-insulin-dependent diabetes, under excellent control. 2.  Colon cancer in 10/2002, with hemicolectomy, no reoccurrence. 3.  Left knee surgery in .  4.  Hypertension, very labile. 5.  Hyperlipidemia, in good control. 6.  Chronic renal insufficiency with a creatinine about at baseline at 1.7 with her pending seeing Dr. Jocelyn Ortiz. 7.  Bilateral knee surgery. SOCIAL HISTORY:  She is 80years old, three children and three stepchildren in [facility]. Her , Corrina Bradley,  in 2021, after being in [facility]. She stays alone at home, although her son, Fidencio Blandon, helps her, comes over daily with food. Her granddaughter, Sathish Linda, lives with her father and works at [employer] in [place]. She does not smoke or drink alcohol. REVIEW OF SYSTEMS:  Cardiac as above. Other systems reviewed including constitutional, eyes, ears, nose and throat, cardiovascular, respiratory, GI, , musculoskeletal, integumentary, neurologic, psychiatric, endocrine, hematologic and allergic/immunologic are negative except for what is described above. No weight loss or weight gain. No change in bowel habits. No blood in stools. No fevers, sweats or chills. PHYSICAL EXAMINATION:  VITAL SIGNS:  Her blood pressure was 130/60 with a heart rate of 91 and regular. Respiratory rate 18. O2 sat 92%. Weight 152 pounds. GENERAL:  She is a very pleasant 57-year-old female. Denied pain. She was oriented to person, place and time. Answered questions appropriately. SKIN:  No unusual skin changes. HEENT:  The pupils are equally round and intact. Mucous membranes were dry. NECK:  No JVD. Good carotid pulses. No carotid bruits. No lymphadenopathy or thyromegaly. CARDIOVASCULAR EXAM:  S1 and S2 were normal.  No S3 or S4. Soft systolic blowing type murmur. No diastolic murmur.   PMI was normal.  No lift, thrust, or pericardial friction rub. LUNGS:  Clear to auscultation and percussion. ABDOMEN:  Soft and nontender. Good bowel sounds. EXTREMITIES:  Good femoral pulses. Good pedal pulses. No pedal edema. Skin was warm and dry. No calf tenderness. Nail beds pink. Good cap refill. PULSES:  Bilateral symmetrical radial, brachial and carotid pulses. No carotid bruits. Good femoral and pedal pulses. NEUROLOGIC EXAM:  Within normal limits. PSYCHIATRIC EXAM:  Within normal limits. LABORATORY DATA:  Her sodium was 136, potassium 4.5, BUN 32, creatinine 1.76. Her magnesium was 1.8, blood sugar was 96, calcium 9.9. Cholesterol 173, HDL 54, LDL 90, triglycerides 145. ALT was 11, AST was less than 5. TSH 1.49. Vitamin D 43.1. White count 6.0, hemoglobin 12.0 with a platelet count 134,200. Bedside echocardiogram showed normal LV function with generous right-sided chambers. EKG showed normal sinus rhythm and was overall normal and unchanged from previous EKGs. Renal ultrasound showed no hydronephrosis with multiple bilateral renal cortical cysts suggestive of polycystic kidney disease. IMPRESSION:  1. Status post cardiac catheterization showing mild plaque disease on 02/20/2008, with no further testing since that time. 2.  Functional class I with no chest pain, shortness of breath, or loss of energy. 3.  Hypertension, well controlled. 4.  Non-insulin-dependent diabetes, under good control. 5.  Hyperlipidemia, under excellent control. 6.  Chronic renal insufficiency with a creatinine about at baseline at 1.7.  7.  Colon cancer in 2002, with a hemicolectomy with no reoccurrence. PLAN:  1. No change in medications. 2.  See in 6 months. 3.  Continue to hunt for a new PCP. .. DISCUSSION:  [place] continues to do excellent. She is at home alone and still drives.   It is concerning that she still is controlling her own medications, as she is on a fair amount, although her lab work suggests that she is very compliant. She has no cardinal symptoms of shortness of breath, chest pain, loss of energy, etc. that would suggest we need to do any testing. Bedside echocardiograms have always showed normal LV function. EKG is essentially normal.    She is truly a delight to see and I look forward to see her in 6 months. Thank you very much for allowing me the privilege of seeing Mrs. P[facility]. If you have any questions on my thoughts, please do not hesitate to contact me.     Sincerely,        Fern Aase    D: 09/15/2022 10:05:34     T: 09/15/2022 10:09:54     [id number]  Job#: [id number]   Doc#: [id number]

## 2022-09-15 NOTE — PROGRESS NOTES
Patient here for 6 mo follow up  No hospitalizations  No procedures  No CP/ No SOB  No dizziness  No edema  No problems  Still lives alone  Ron Barbosa (son) brings her food everyday  Harshad Alford granddaughter works at [employer] a lot  Lives with Deandre/Vandana  Sees Dr. Rosales Lung next week  Appetite good  Pulses good  Labs good  See in 1 year  No changes

## 2022-09-19 NOTE — PROGRESS NOTES
Ebenezer Florez M.D. [address] [place], [place] [address]Susan   [phone]        September 15, 2022        Imtiaz Benites MD  [address] [place] [address]    RE:   Han Doss  :  10/20/1931    Dear Dr. Elyssa Kong:    [address]:  1. Hypertension. 2.  Hyperlipidemia. 3.  Mild CAD. HISTORY OF PRESENT ILLNESS:  I had the pleasure of seeing Mrs. Shelley Palmer in our office on 09/15/2022. She is a very pleasant 27-year-old female who stays alone and is still able to drive. Her , Bety Aldana, passed away in 2021, after being in [facility].  They have been  for 42 years. Her son, Jesus Calderon, and daughter-in-law, Deja Kumar, bring her food every day. Alisa Villatoro helps her with daily household needs, although she still gets her medicines herself. She had a cardiac catheterization on 2008, that showed mild plaque disease. She has had no heart catheterization after that time. She has not had a cardiac stress test since then. Her EF has always been in the normal range by bedside echocardiograms. She denies any chest pain or chest discomfort or any unusual shortness of breath. She has had no syncope, near syncope, lightheaded or dizziness and denies any palpitations. She has had no hospitalizations or procedures. She does very well with her memory. She does not remember who her family doctor is; however, she does remember that she wants to find a new physician. She does not remember why. Dane Braga .(I reminded her of the reasons. ..). She still takes her dog, Tatiana Erazo who is a terrier. CARDIAC RISK FACTORS:  Hypertension:  Positive. Hyperlipidemia:  Positive. Diabetes:  Positive. Peripheral Vascular Disease:  Negative. Smoking:  Negative. Other Family Members:  Negative.     MEDICATIONS:  At home, she is currently on aspirin 81 mg daily, vitamin D 2000 units daily, Prevacid 30 mg daily, Tradjenta 5 mg daily, lisinopril 10 mg daily, Mevacor 40 mg daily, Paxil 20 mg daily, and Dyazide 37.5/25 daily. PAST MEDICAL AND SURGICAL HISTORY:  1.  Non-insulin-dependent diabetes, under excellent control. 2.  Colon cancer in 10/2002, with hemicolectomy, no reoccurrence. 3.  Left knee surgery in .  4.  Hypertension, very labile. 5.  Hyperlipidemia, in good control. 6.  Chronic renal insufficiency with a creatinine about at baseline at 1.7 with her pending seeing Dr. Jazzy Monique. 7.  Bilateral knee surgery. SOCIAL HISTORY:  She is 80years old, three children and three stepchildren in [place]. Her , Mony Gleason,  in 2021, after being in [facility]. She stays alone at home, although her son, Tony Van, helps her, comes over daily with food. Her granddaughter, Nahed Fay, lives with her father and works at [employer] in [place]. She does not smoke or drink alcohol. REVIEW OF SYSTEMS:  Cardiac as above. Other systems reviewed including constitutional, eyes, ears, nose and throat, cardiovascular, respiratory, GI, , musculoskeletal, integumentary, neurologic, psychiatric, endocrine, hematologic and allergic/immunologic are negative except for what is described above. No weight loss or weight gain. No change in bowel habits. No blood in stools. No fevers, sweats or chills. PHYSICAL EXAMINATION:  VITAL SIGNS:  Her blood pressure was 130/60 with a heart rate of 91 and regular. Respiratory rate 18. O2 sat 92%. Weight 152 pounds. GENERAL:  She is a very pleasant 80-year-old female. Denied pain. She was oriented to person, place and time. Answered questions appropriately. SKIN:  No unusual skin changes. HEENT:  The pupils are equally round and intact. Mucous membranes were dry. NECK:  No JVD. Good carotid pulses. No carotid bruits. No lymphadenopathy or thyromegaly. CARDIOVASCULAR EXAM:  S1 and S2 were normal.  No S3 or S4. Soft systolic blowing type murmur. No diastolic murmur.   PMI was normal.  No lift, thrust, or pericardial friction rub. LUNGS:  Clear to auscultation and percussion. ABDOMEN:  Soft and nontender. Good bowel sounds. EXTREMITIES:  Good femoral pulses. Good pedal pulses. No pedal edema. Skin was warm and dry. No calf tenderness. Nail beds pink. Good cap refill. PULSES:  Bilateral symmetrical radial, brachial and carotid pulses. No carotid bruits. Good femoral and pedal pulses. NEUROLOGIC EXAM:  Within normal limits. PSYCHIATRIC EXAM:  Within normal limits. LABORATORY DATA:  Her sodium was 136, potassium 4.5, BUN 32, creatinine 1.76. Her magnesium was 1.8, blood sugar was 96, calcium 9.9. Cholesterol 173, HDL 54, LDL 90, triglycerides 145. ALT was 11, AST was less than 5. TSH 1.49. Vitamin D 43.1. White count 6.0, hemoglobin 12.0 with a platelet count 335,871. Bedside echocardiogram showed normal LV function with generous right-sided chambers. EKG showed normal sinus rhythm and was overall normal and unchanged from previous EKGs. Renal ultrasound showed no hydronephrosis with multiple bilateral renal cortical cysts suggestive of polycystic kidney disease. IMPRESSION:  1. Status post cardiac catheterization showing mild plaque disease on 02/20/2008, with no further testing since that time. 2.  Functional class I with no chest pain, shortness of breath, or loss of energy. 3.  Hypertension, well controlled. 4.  Non-insulin-dependent diabetes, under good control. 5.  Hyperlipidemia, under excellent control. 6.  Chronic renal insufficiency with a creatinine about at baseline at 1.7.  7.  Colon cancer in 2002, with a hemicolectomy with no reoccurrence. PLAN:  1. No change in medications. 2.  See in 6 months. 3.  Continue to hunt for a new PCP. .. DISCUSSION:  [place] continues to do excellent. She is at home alone and still drives.   It is concerning that she still is controlling her own medications, as she is on a fair amount, although her lab work suggests that she is very compliant. She has no cardinal symptoms of shortness of breath, chest pain, loss of energy, etc. that would suggest we need to do any testing. Bedside echocardiograms have always showed normal LV function. EKG is essentially normal.    She is truly a delight to see and I look forward to see her in 6 months. Thank you very much for allowing me the privilege of seeing Mrs. Kenyetta Paz. If you have any questions on my thoughts, please do not hesitate to contact me.     Sincerely,        Rashida Lisa    D: 09/15/2022 10:05:34     T: 09/15/2022 10:09:54     [id number]  Job#: [id number]   Doc#: [id number]

## 2022-09-23 DIAGNOSIS — E78.2 MIXED HYPERLIPIDEMIA: ICD-10-CM

## 2022-09-23 RX ORDER — LOVASTATIN 40 MG/1
TABLET ORAL
Qty: 60 TABLET | Refills: 5 | Status: SHIPPED | OUTPATIENT
Start: 2022-09-23

## 2022-09-23 NOTE — TELEPHONE ENCOUNTER
Health Maintenance   Topic Date Due    DTaP/Tdap/Td vaccine (1 - Tdap) Never done    Shingles vaccine (1 of 2) Never done    COVID-19 Vaccine (4 - Booster for Moderna series) 04/13/2022    Flu vaccine (1) 09/01/2022    Depression Monitoring  03/22/2023    Annual Wellness Visit (AWV)  03/23/2023    Lipids  09/14/2023    Pneumococcal 65+ years Vaccine  Completed    Hepatitis A vaccine  Aged Out    Hib vaccine  Aged Out    Meningococcal (ACWY) vaccine  Aged Out             (applicable per patient's age: Cancer Screenings, Depression Screening, Fall Risk Screening, Immunizations)    Hemoglobin A1C (%)   Date Value   03/04/2022 6.1 (H)   03/02/2021 5.8   03/09/2020 5.7     Microalb/Crt.  Ratio (mcg/mg creat)   Date Value   08/07/2013 132     LDL Cholesterol (mg/dL)   Date Value   09/14/2022 90     AST (U/L)   Date Value   09/14/2022 11     ALT (U/L)   Date Value   09/14/2022 <5 (L)     BUN (mg/dL)   Date Value   09/14/2022 32 (H)      (goal A1C is < 7)   (goal LDL is <100) need 30-50% reduction from baseline     BP Readings from Last 3 Encounters:   09/15/22 130/60   03/29/22 136/89   03/22/22 136/85    (goal /80)      All Future Testing planned in [facility]:  Lab Frequency Next Occurrence   CBC with Auto Differential Once 03/25/2023   Lipid Panel Once 03/25/2023   Comprehensive Metabolic Panel Once 43/61/6094   TSH with Reflex Once 03/25/2023   Magnesium Once 03/25/2023   EKG 12 Lead Once 03/25/2023   XR CHEST (2 VW) Once 03/25/2023   Hemoglobin A1C Once 03/15/2023       Next Visit Date:  Future Appointments   Date Time Provider Pradeep Coello   9/30/2022  9:45 AM MD Xavier Avila [facility]   3/23/2023  1:00 PM [facility]   3/30/2023 11:00 AM MD Sandra Valladares [facility]            Patient Active Problem List:     Vitamin D deficiency     Osteoporosis, senile     Mixed hypercholesterolemia and hypertriglyceridemia     History of colon cancer     GERD (gastroesophageal reflux disease)     CAD (coronary artery disease)     Benign essential HTN     Cancer (HCC)     Osteoarthrosis     Colon cancer (HCC)     CRI (chronic renal insufficiency)     Controlled type 2 diabetes mellitus with stage 2 chronic kidney disease, without long-term current use of insulin (HCC)     Major depressive disorder with single episode, in full remission ([address]     Chronic kidney disease, stage IV (severe) ([address]     Dupuytren's contracture of right hand

## 2022-09-30 ENCOUNTER — OFFICE VISIT (OUTPATIENT)
Dept: FAMILY MEDICINE CLINIC | Age: 87
End: 2022-09-30
Payer: MEDICARE

## 2022-09-30 VITALS
HEART RATE: 86 BPM | DIASTOLIC BLOOD PRESSURE: 80 MMHG | BODY MASS INDEX: 27.64 KG/M2 | WEIGHT: 156 LBS | HEIGHT: 63 IN | SYSTOLIC BLOOD PRESSURE: 130 MMHG

## 2022-09-30 DIAGNOSIS — Z23 NEED FOR INFLUENZA VACCINATION: ICD-10-CM

## 2022-09-30 DIAGNOSIS — E78.2 MIXED HYPERCHOLESTEROLEMIA AND HYPERTRIGLYCERIDEMIA: ICD-10-CM

## 2022-09-30 DIAGNOSIS — Z85.038 H/O MALIGNANT NEOPLASM OF COLON: ICD-10-CM

## 2022-09-30 DIAGNOSIS — I25.10 CORONARY ARTERY DISEASE INVOLVING NATIVE CORONARY ARTERY OF NATIVE HEART WITHOUT ANGINA PECTORIS: ICD-10-CM

## 2022-09-30 DIAGNOSIS — F32.5 MAJOR DEPRESSIVE DISORDER WITH SINGLE EPISODE, IN FULL REMISSION (HCC): ICD-10-CM

## 2022-09-30 DIAGNOSIS — E55.9 VITAMIN D DEFICIENCY: ICD-10-CM

## 2022-09-30 DIAGNOSIS — N18.4 CHRONIC KIDNEY DISEASE, STAGE IV (SEVERE) (HCC): ICD-10-CM

## 2022-09-30 DIAGNOSIS — I10 BENIGN ESSENTIAL HTN: ICD-10-CM

## 2022-09-30 DIAGNOSIS — R22.0 FACIAL MASS: Primary | ICD-10-CM

## 2022-09-30 PROCEDURE — 90674 CCIIV4 VAC NO PRSV 0.5 ML IM: CPT | Performed by: INTERNAL MEDICINE

## 2022-09-30 PROCEDURE — 1090F PRES/ABSN URINE INCON ASSESS: CPT | Performed by: INTERNAL MEDICINE

## 2022-09-30 PROCEDURE — G0008 ADMIN INFLUENZA VIRUS VAC: HCPCS | Performed by: INTERNAL MEDICINE

## 2022-09-30 PROCEDURE — 1036F TOBACCO NON-USER: CPT | Performed by: INTERNAL MEDICINE

## 2022-09-30 PROCEDURE — 99214 OFFICE O/P EST MOD 30 MIN: CPT | Performed by: INTERNAL MEDICINE

## 2022-09-30 PROCEDURE — G8417 CALC BMI ABV UP PARAM F/U: HCPCS | Performed by: INTERNAL MEDICINE

## 2022-09-30 PROCEDURE — G8427 DOCREV CUR MEDS BY ELIG CLIN: HCPCS | Performed by: INTERNAL MEDICINE

## 2022-09-30 PROCEDURE — 1123F ACP DISCUSS/DSCN MKR DOCD: CPT | Performed by: INTERNAL MEDICINE

## 2022-09-30 SDOH — ECONOMIC STABILITY: FOOD INSECURITY: WITHIN THE PAST 12 MONTHS, YOU WORRIED THAT YOUR FOOD WOULD RUN OUT BEFORE YOU GOT MONEY TO BUY MORE.: NEVER TRUE

## 2022-09-30 SDOH — ECONOMIC STABILITY: FOOD INSECURITY: WITHIN THE PAST 12 MONTHS, THE FOOD YOU BOUGHT JUST DIDN'T LAST AND YOU DIDN'T HAVE MONEY TO GET MORE.: NEVER TRUE

## 2022-09-30 ASSESSMENT — ENCOUNTER SYMPTOMS
BLOOD IN STOOL: 0
CONSTIPATION: 0
SHORTNESS OF BREATH: 0
CHEST TIGHTNESS: 0
ABDOMINAL PAIN: 0
COUGH: 0
SORE THROAT: 0
RHINORRHEA: 0
DIARRHEA: 0
NAUSEA: 0

## 2022-09-30 ASSESSMENT — SOCIAL DETERMINANTS OF HEALTH (SDOH): HOW HARD IS IT FOR YOU TO PAY FOR THE VERY BASICS LIKE FOOD, HOUSING, MEDICAL CARE, AND HEATING?: NOT HARD AT ALL

## 2022-09-30 NOTE — PROGRESS NOTES
[address] (:  10/20/1931) is a 80 y.o. female,Established patient, here for evaluation of the following chief complaint(s):  Hypertension (Check up, review labs from [facility]), Diabetes Mellitus, Hyperlipidemia, Gastroesophageal Reflux, and Mass (Would like mass on left cheek checked)         ASSESSMENT/PLAN:  1. Facial mass  -     External Referral To General Surgery  2. Need for influenza vaccination  -     Influenza, FLUCELVAX, (age 10 mo+), IM, Preservative Free, 0.5 mL  3. Chronic kidney disease, stage IV (severe) ([address]  4. H/O malignant neoplasm of colon  5. Coronary artery disease involving native coronary artery of native heart without angina pectoris  6. Benign essential HTN  7. Vitamin D deficiency  8. Mixed hypercholesterolemia and hypertriglyceridemia  9. Major depressive disorder with single episode, in full remission ([address]    Plan:  1. Need for influenza vaccination  [place] was given an influenza vaccine today. - Influenza, FLUCELVAX, (age 10 mo+), IM, Preservative Free, 0.5 mL    2. Facial mass  Refer to Dr. Joshua Pabon to evaluate for excision.  - External Referral To General Surgery    3. Chronic kidney disease, stage IV (severe) ([address]  Follows with Dr. Andressa Olmstead. Renal function has been stable. Avoid NSAIDS. 4. H/O malignant neoplasm of colon  S/P colon resection. 5. Coronary artery disease involving native coronary artery of native heart without angina pectoris  No CP / SOB. Follows with Dr. Goldsmith. 6. Benign essential HTN  Controlled on the current medication. No change in medication. 7. Vitamin D deficiency  Controlled on Vitamin D replacement. 8. Mixed hypercholesterolemia and hypertriglyceridemia  Controlled on Mevacor. No change in medication. Dr. Goldsmith follows labs. 9. Major depressive disorder with single episode, in full remission ([address]  Stable on Paxil. No change in medication.       [place] was instructed to follow up in the clinic in 6 months for check up or as needed with any medical issues. Subjective   SUBJECTIVE/OBJECTIVE:  [place] presents for a check up on her medical conditions HTN, Hyperlipidemia, GERD. [place] admits to new problems. Medications were reviewed with [place], she is  tolerating the medication. Bowels are regular. There has not been rectal bleeding. [place] denies urinary complications, the urine stream is good. [place] denies chest pain and denies increasing shortness of breath. [place] has a mass on the left side of her face that is enlarging over the past year. No pain over the area. No history of skin cancer. She tried some drawling salve on the area with no improvement. Past Medical History:  No date: Cancer [facility])      Comment:  colon  10/02: [facility] cancer [address] [address]  No date: Diabetes mellitus ([facility] [address] [facility]  No date: Esophageal reflux  No date: Heart disease  No date: Hypercholesterolemia  No date: Hypertension  No date: Microalbuminuria  No date: Osteoarthrosis  No date: Osteoporosis    Past Surgical History:  , 12: COLONOSCOPY  07/10/2017: COLONOSCOPY      Comment:  Dr. Nick Jarrell:  3 adenomatous polyps. 08: DIAGNOSTIC CARDIAC CATH LAB PROCEDURE  10/02: HEMICOLECTOMY  No date: HYSTERECTOMY (CERVIX STATUS UNKNOWN)  1973: KNEE SURGERY  7/10/2017: SC COLON CA SCRN NOT HI RSK IND; N/A      Comment:  COLONOSCOPY performed by Thong Hugo MD at [address] History    Socioeconomic History      Marital status:        Spouse name: Not on file      Number of children: Not on file      Years of education: Not on file      Highest education level: Not on file    Occupational History        Employer: UNEMPLOYED    Tobacco Use      Smoking status: Former        Types: Cigarettes        Quit date: 10/11/1977        Years since quittin.0      Smokeless tobacco: Never    Substance and Sexual Activity      Alcohol use: No      Drug use: Not on file      Sexual activity: Not on file    Other Topics Concerns:        Not on file    Social History Narrative      Not on file    Social Determinants of Health  Financial Resource Strain: Low Risk       Difficulty of Paying Living Expenses: Not hard at all  Food Insecurity: No Food Insecurity      Worried About Running Out of Food in the Last Year: Never true      Ran Out of Food in the Last Year: Never true  Transportation Needs: Not on file  Physical Activity: Inactive      Days of Exercise per Week: 0 days      Minutes of Exercise per Session: 0 min  Stress: Not on file  Social Connections: Not on file  Intimate Partner Violence: Not on file  Housing Stability: Not on file    Review of patient's family history indicates:  Problem: Heart Attack      Relation: Brother          Age of Onset: (Not Specified)      Current Outpatient Medications on File Prior to Visit:  lovastatin (MEVACOR) 40 MG tablet, TAKE 1 TABLET BY MOUTH NIGHTLY, Disp: 60 tablet, Rfl: 5  triamterene-hydroCHLOROthiazide (DYAZIDE) 37.5-25 MG per capsule, TAKE 1 CAPSULE BY MOUTH EVERY DAY, Disp: 30 capsule, Rfl: 5  PARoxetine (PAXIL) 20 MG tablet, TAKE 1 TABLET BY MOUTH EVERY DAY IN THE MORNING, Disp: 30 tablet, Rfl: 5  lisinopril (PRINIVIL;ZESTRIL) 10 MG tablet, TAKE 1 TABLET BY MOUTH EVERY DAY, Disp: 30 tablet, Rfl: 5  lansoprazole (PREVACID) 30 MG delayed release capsule, TAKE 1 CAPSULE BY MOUTH EVERY DAY, Disp: 30 capsule, Rfl: 5  linagliptin (TRADJENTA) 5 MG tablet, Take 1 tablet by mouth daily, Disp: 14 tablet, Rfl: 0  FIBER PO, Take 1 tablet by mouth daily, Disp: , Rfl:   aspirin 81 MG tablet, Take 81 mg by mouth 2 times daily , Disp: , Rfl:   Cholecalciferol (VITAMIN D) 2000 UNITS CAPS capsule, Take 1 capsule by mouth daily. , Disp: 30 capsule, Rfl: 11  [DISCONTINUED] PARoxetine (PAXIL) 20 MG tablet, TAKE 1 TABLET BY MOUTH EVERY DAY IN THE MORNING, Disp: 30 tablet, Rfl: 5    No current facility-administered medications on file prior to visit.       No Known Allergies      Lab Results Component                Value               Date                       NA                       136                 09/14/2022                 K                        4.5                 09/14/2022                 CL                       101                 09/14/2022                 CO2                      23                  09/14/2022                 BUN                      32 (H)              09/14/2022                 CREATININE               1.76 (H)            09/14/2022                 GLUCOSE                  96                  09/14/2022                 CALCIUM                  9.9                 09/14/2022                 PROT                     7.4                 09/14/2022                 LABALBU                  3.9                 09/14/2022                 BILITOT                  0.5                 09/14/2022                 ALKPHOS                  130 (H)             09/14/2022                 AST                      11                  09/14/2022                 ALT                      <5 (L)              09/14/2022                 LABGLOM                  27 (L)              09/14/2022                 GFRAA                    33 (L)              09/14/2022              Lab Results       Component                Value               Date                       LABA1C                   6.1 (H)             03/04/2022            Lab Results       Component                Value               Date                       EAG                      128                 03/04/2022              Lab Results       Component                Value               Date                       CHOL                     173                 09/14/2022                 CHOL                     184                 03/04/2022                 CHOL                     170                 03/02/2021            Lab Results       Component                Value               Date                       TRIG 145                 09/14/2022                 TRIG                     149                 03/04/2022                 TRIG                     174 (H)             03/02/2021            Lab Results       Component                Value               Date                       HDL                      54                  09/14/2022                 HDL                      69                  03/04/2022                 HDL                      68                  03/02/2021            Lab Results       Component                Value               Date                       LDLCHOLESTEROL           90                  09/14/2022                 LDLCHOLESTEROL           85                  03/04/2022                 LDLCHOLESTEROL           67                  03/02/2021            Lab Results       Component                Value               Date                       VLDL                                         03/02/2021             NOT REPORTED (H)       VLDL                                         03/09/2020             NOT REPORTED (H)       VLDL                                         03/20/2019             NOT REPORTED (H)  Lab Results       Component                Value               Date                       CHOLHDLRATIO             3.2                 09/14/2022                 CHOLHDLRATIO             2.7                 03/04/2022                 CHOLHDLRATIO             2.5                 03/02/2021                                Hypertension  This is a chronic problem. The current episode started more than 1 year ago. The problem is unchanged. Pertinent negatives include no chest pain, neck pain, palpitations or shortness of breath. Past treatments include diuretics and ACE inhibitors. The current treatment provides significant improvement. There is no history of angina. Hyperlipidemia  This is a chronic problem. The current episode started more than 1 year ago. The problem is controlled.  Recent lipid tests were reviewed and are normal. Pertinent negatives include no chest pain, myalgias or shortness of breath. Current antihyperlipidemic treatment includes statins. The current treatment provides significant improvement of lipids. There are no compliance problems. Gastroesophageal Reflux  She reports no abdominal pain, no chest pain, no coughing, no nausea or no sore throat. This is a chronic problem. The current episode started more than 1 year ago. The problem occurs rarely. The problem has been unchanged. She has tried a PPI for the symptoms. The treatment provided significant relief. Review of Systems   Constitutional: Negative. HENT:  Negative for congestion, ear pain, rhinorrhea, sneezing and sore throat. Eyes:  Negative for visual disturbance. Respiratory:  Negative for cough, chest tightness and shortness of breath. Cardiovascular:  Negative for chest pain and palpitations. Gastrointestinal:  Negative for abdominal pain, blood in stool, constipation, diarrhea and nausea. Genitourinary:  Negative for difficulty urinating, dysuria, frequency, menstrual problem and urgency. Musculoskeletal:  Negative for arthralgias, joint swelling, myalgias and neck pain. Skin:         Left facial mass. Neurological:  Negative for syncope. Psychiatric/Behavioral: Negative. Objective   Physical Exam  Constitutional:       Appearance: She is well-developed. HENT:      Head: Atraumatic. Eyes:      Conjunctiva/sclera: Conjunctivae normal.   Cardiovascular:      Rate and Rhythm: Normal rate and regular rhythm. Heart sounds: Murmur heard. Pulmonary:      Effort: Pulmonary effort is normal.      Breath sounds: Normal breath sounds. Abdominal:      Palpations: Abdomen is soft. Tenderness: There is no abdominal tenderness. Musculoskeletal:         General: Normal range of motion. Cervical back: Normal range of motion and neck supple.    Lymphadenopathy: Cervical: No cervical adenopathy. Skin:     Findings: No rash. Neurological:      Mental Status: She is alert. Psychiatric:         Behavior: Behavior normal.         Thought Content: Thought content normal.                An electronic signature was used to authenticate this note.     --Maria Luz Vazquez MD

## 2022-09-30 NOTE — PATIENT INSTRUCTIONS
1. Need for influenza vaccination  [place] was given an influenza vaccine today. - Influenza, FLUCELVAX, (age 10 mo+), IM, Preservative Free, 0.5 mL    2. Facial mass  Refer to Dr. Cain Molina to evaluate for excision.  - External Referral To General Surgery    3. Chronic kidney disease, stage IV (severe) ([facility]  Follows with Dr. Ivone Montano. Renal function has been stable. Avoid NSAIDS. 4. H/O malignant neoplasm of colon  S/P colon resection. 5. Coronary artery disease involving native coronary artery of native heart without angina pectoris  No CP / SOB. Follows with Dr. Lonzell Kayser. 6. Benign essential HTN  Controlled on the current medication. No change in medication. 7. Vitamin D deficiency  Controlled on Vitamin D replacement. 8. Mixed hypercholesterolemia and hypertriglyceridemia  Controlled on Mevacor. No change in medication. Dr. Lonzell Kayser follows labs. 9. Major depressive disorder with single episode, in full remission ([facility]  Stable on Paxil. No change in medication. [place] was instructed to follow up in the clinic in 6 months for check up or as needed with any medical issues.

## 2022-10-27 DIAGNOSIS — I10 BENIGN ESSENTIAL HTN: ICD-10-CM

## 2022-10-27 RX ORDER — LISINOPRIL 10 MG/1
TABLET ORAL
Qty: 30 TABLET | Refills: 5 | Status: SHIPPED | OUTPATIENT
Start: 2022-10-27

## 2022-11-08 DIAGNOSIS — K21.00 GASTROESOPHAGEAL REFLUX DISEASE WITH ESOPHAGITIS: ICD-10-CM

## 2022-11-10 RX ORDER — LANSOPRAZOLE 30 MG/1
CAPSULE, DELAYED RELEASE ORAL
Qty: 30 CAPSULE | Refills: 5 | Status: SHIPPED | OUTPATIENT
Start: 2022-11-10

## 2023-02-03 DIAGNOSIS — N18.2 CONTROLLED TYPE 2 DIABETES MELLITUS WITH STAGE 2 CHRONIC KIDNEY DISEASE, WITHOUT LONG-TERM CURRENT USE OF INSULIN (HCC): ICD-10-CM

## 2023-02-03 DIAGNOSIS — F32.A DEPRESSION: ICD-10-CM

## 2023-02-03 DIAGNOSIS — E11.22 CONTROLLED TYPE 2 DIABETES MELLITUS WITH STAGE 2 CHRONIC KIDNEY DISEASE, WITHOUT LONG-TERM CURRENT USE OF INSULIN (HCC): ICD-10-CM

## 2023-02-03 DIAGNOSIS — I10 BENIGN ESSENTIAL HTN: ICD-10-CM

## 2023-02-03 RX ORDER — TRIAMTERENE AND HYDROCHLOROTHIAZIDE 37.5; 25 MG/1; MG/1
CAPSULE ORAL
Qty: 30 CAPSULE | Refills: 5 | Status: SHIPPED | OUTPATIENT
Start: 2023-02-03

## 2023-02-03 RX ORDER — PAROXETINE HYDROCHLORIDE 20 MG/1
TABLET, FILM COATED ORAL
Qty: 30 TABLET | Refills: 5 | Status: SHIPPED | OUTPATIENT
Start: 2023-02-03

## 2023-02-03 NOTE — TELEPHONE ENCOUNTER
Health Maintenance   Topic Date Due    DTaP/Tdap/Td vaccine (1 - Tdap) Never done    Shingles vaccine (1 of 2) Never done    COVID-19 Vaccine (4 - Booster for Moderna series) 02/07/2022    Depression Monitoring  03/22/2023    Annual Wellness Visit (AWV)  03/23/2023    Lipids  09/14/2023    Flu vaccine  Completed    Pneumococcal 65+ years Vaccine  Completed    Hepatitis A vaccine  Aged Out    Hib vaccine  Aged Out    Meningococcal (ACWY) vaccine  Aged Out             (applicable per patient's age: Cancer Screenings, Depression Screening, Fall Risk Screening, Immunizations)    Hemoglobin A1C (%)   Date Value   03/04/2022 6.1 (H)   03/02/2021 5.8   03/09/2020 5.7     Microalb/Crt.  Ratio (mcg/mg creat)   Date Value   08/07/2013 132     LDL Cholesterol (mg/dL)   Date Value   09/14/2022 90     AST (U/L)   Date Value   09/14/2022 11     ALT (U/L)   Date Value   09/14/2022 <5 (L)     BUN (mg/dL)   Date Value   09/14/2022 32 (H)      (goal A1C is < 7)   (goal LDL is <100) need 30-50% reduction from baseline     BP Readings from Last 3 Encounters:   09/30/22 130/80   09/15/22 130/60   03/29/22 136/89    (goal /80)      All Future Testing planned in [facility]:  Lab Frequency Next Occurrence   CBC with Auto Differential Once 03/25/2023   Lipid Panel Once 03/25/2023   Comprehensive Metabolic Panel Once 98/07/0739   TSH with Reflex Once 03/25/2023   Magnesium Once 03/25/2023   EKG 12 Lead Once 03/25/2023   XR CHEST (2 VW) Once 03/25/2023   Hemoglobin A1C Once 03/15/2023       Next Visit Date:  Future Appointments   Date Time Provider Pradeep Coello   3/23/2023  1:00 PM [facility] Geroldine Creamer NURSE [facility]   3/30/2023 11:00 AM MD Carlos Ivory [facility]   4/13/2023  1:30 PM MD Ebony Araya            Patient Active Problem List:     Vitamin D deficiency     Osteoporosis, senile     Mixed hypercholesterolemia and hypertriglyceridemia     H/O malignant neoplasm of colon GERD (gastroesophageal reflux disease)     CAD (coronary artery disease)     Benign essential HTN     Cancer (HCC)     Osteoarthrosis     Controlled type 2 diabetes mellitus with stage 2 chronic kidney disease, without long-term current use of insulin (HCC)     Major depressive disorder with single episode, in full remission ([address]     Chronic kidney disease, stage IV (severe) ([address]     Dupuytren's contracture of right hand

## 2023-03-10 DIAGNOSIS — I10 BENIGN ESSENTIAL HTN: ICD-10-CM

## 2023-03-10 DIAGNOSIS — E78.2 MIXED HYPERLIPIDEMIA: ICD-10-CM

## 2023-03-10 DIAGNOSIS — K21.00 GASTROESOPHAGEAL REFLUX DISEASE WITH ESOPHAGITIS: ICD-10-CM

## 2023-03-10 RX ORDER — LOVASTATIN 40 MG/1
TABLET ORAL
Qty: 30 TABLET | Refills: 2 | Status: SHIPPED | OUTPATIENT
Start: 2023-03-10

## 2023-03-10 RX ORDER — LISINOPRIL 10 MG/1
TABLET ORAL
Qty: 30 TABLET | Refills: 2 | Status: SHIPPED | OUTPATIENT
Start: 2023-03-10

## 2023-03-10 RX ORDER — LANSOPRAZOLE 30 MG/1
CAPSULE, DELAYED RELEASE ORAL
Qty: 30 CAPSULE | Refills: 2 | Status: SHIPPED | OUTPATIENT
Start: 2023-03-10

## 2023-03-27 ENCOUNTER — HOSPITAL ENCOUNTER (OUTPATIENT)
Age: 88
Discharge: HOME | End: 2023-03-27
Payer: MEDICARE

## 2023-03-27 ENCOUNTER — HOSPITAL ENCOUNTER (OUTPATIENT)
Age: 88
Discharge: HOME | End: 2023-03-29
Payer: MEDICARE

## 2023-03-27 ENCOUNTER — HOSPITAL ENCOUNTER (OUTPATIENT)
Dept: GENERAL RADIOLOGY | Age: 88
Discharge: HOME | End: 2023-03-29
Payer: MEDICARE

## 2023-03-27 DIAGNOSIS — I10 BENIGN ESSENTIAL HTN: ICD-10-CM

## 2023-03-27 DIAGNOSIS — N18.4 CHRONIC KIDNEY DISEASE, STAGE IV (SEVERE) (HCC): ICD-10-CM

## 2023-03-27 DIAGNOSIS — E78.2 MIXED HYPERCHOLESTEROLEMIA AND HYPERTRIGLYCERIDEMIA: ICD-10-CM

## 2023-03-27 DIAGNOSIS — E11.22 CONTROLLED TYPE 2 DIABETES MELLITUS WITH STAGE 2 CHRONIC KIDNEY DISEASE, WITHOUT LONG-TERM CURRENT USE OF INSULIN (HCC): ICD-10-CM

## 2023-03-27 DIAGNOSIS — I25.10 CORONARY ARTERY DISEASE INVOLVING NATIVE CORONARY ARTERY OF NATIVE HEART WITHOUT ANGINA PECTORIS: ICD-10-CM

## 2023-03-27 DIAGNOSIS — I10 PRIMARY HYPERTENSION: ICD-10-CM

## 2023-03-27 DIAGNOSIS — N18.2 CONTROLLED TYPE 2 DIABETES MELLITUS WITH STAGE 2 CHRONIC KIDNEY DISEASE, WITHOUT LONG-TERM CURRENT USE OF INSULIN (HCC): ICD-10-CM

## 2023-03-27 LAB
ABSOLUTE EOS #: 0.1 K/UL (ref 0–0.4)
ABSOLUTE LYMPH #: 2.6 K/UL (ref 1–4.8)
ABSOLUTE MONO #: 0.4 K/UL (ref 0–1)
ALBUMIN SERPL-MCNC: 4.2 G/DL (ref 3.5–5.2)
ALP SERPL-CCNC: 115 U/L (ref 35–104)
ALT SERPL-CCNC: 6 U/L (ref 5–33)
ANION GAP SERPL CALCULATED.3IONS-SCNC: 11 MMOL/L (ref 9–17)
AST SERPL-CCNC: 10 U/L
BASOPHILS # BLD: 1 % (ref 0–2)
BASOPHILS ABSOLUTE: 0 K/UL (ref 0–0.2)
BILIRUB SERPL-MCNC: 0.4 MG/DL (ref 0.3–1.2)
BUN SERPL-MCNC: 32 MG/DL (ref 8–23)
BUN/CREAT BLD: 16 (ref 9–20)
CALCIUM SERPL-MCNC: 9.8 MG/DL (ref 8.6–10.4)
CHLORIDE SERPL-SCNC: 105 MMOL/L (ref 98–107)
CHOLEST SERPL-MCNC: 211 MG/DL
CHOLESTEROL/HDL RATIO: 3.6
CO2 SERPL-SCNC: 25 MMOL/L (ref 20–31)
CREAT SERPL-MCNC: 1.98 MG/DL (ref 0.5–0.9)
DIFFERENTIAL TYPE: YES
EKG ATRIAL RATE: 82 BPM
EKG P AXIS: 27 DEGREES
EKG P-R INTERVAL: 178 MS
EKG Q-T INTERVAL: 300 MS
EKG QRS DURATION: 56 MS
EKG QTC CALCULATION (BAZETT): 350 MS
EKG R AXIS: 3 DEGREES
EKG T AXIS: 53 DEGREES
EKG VENTRICULAR RATE: 82 BPM
EOSINOPHILS RELATIVE PERCENT: 2 % (ref 0–5)
EST. AVERAGE GLUCOSE BLD GHB EST-MCNC: 120 MG/DL
GFR SERPL CREATININE-BSD FRML MDRD: 23 ML/MIN/1.73M2
GLUCOSE SERPL-MCNC: 103 MG/DL (ref 70–99)
HBA1C MFR BLD: 5.8 % (ref 4–6)
HCT VFR BLD AUTO: 38.6 % (ref 36–46)
HDLC SERPL-MCNC: 59 MG/DL
HGB BLD-MCNC: 12.8 G/DL (ref 12–16)
LDLC SERPL CALC-MCNC: 101 MG/DL (ref 0–130)
LYMPHOCYTES # BLD: 34 % (ref 15–40)
MAGNESIUM SERPL-MCNC: 1.9 MG/DL (ref 1.6–2.6)
MCH RBC QN AUTO: 29.1 PG (ref 26–34)
MCHC RBC AUTO-ENTMCNC: 33 G/DL (ref 31–37)
MCV RBC AUTO: 88.2 FL (ref 80–100)
MONOCYTES # BLD: 5 % (ref 4–8)
PATIENT FASTING?: YES
PDW BLD-RTO: 13.9 % (ref 12.1–15.2)
PLATELET # BLD AUTO: 230 K/UL (ref 140–450)
POTASSIUM SERPL-SCNC: 4.5 MMOL/L (ref 3.7–5.3)
PROT SERPL-MCNC: 7.4 G/DL (ref 6.4–8.3)
RBC # BLD: 4.38 M/UL (ref 4–5.2)
SEG NEUTROPHILS: 58 % (ref 47–75)
SEGMENTED NEUTROPHILS ABSOLUTE COUNT: 4.5 K/UL (ref 2.5–7)
SODIUM SERPL-SCNC: 141 MMOL/L (ref 135–144)
TRIGL SERPL-MCNC: 255 MG/DL
TSH SERPL-ACNC: 2.61 UIU/ML (ref 0.3–5)
WBC # BLD AUTO: 7.6 K/UL (ref 3.5–11)

## 2023-03-27 PROCEDURE — 80061 LIPID PANEL: CPT

## 2023-03-27 PROCEDURE — 80053 COMPREHEN METABOLIC PANEL: CPT

## 2023-03-27 PROCEDURE — 83735 ASSAY OF MAGNESIUM: CPT

## 2023-03-27 PROCEDURE — 36415 COLL VENOUS BLD VENIPUNCTURE: CPT

## 2023-03-27 PROCEDURE — 84443 ASSAY THYROID STIM HORMONE: CPT

## 2023-03-27 PROCEDURE — 93005 ELECTROCARDIOGRAM TRACING: CPT

## 2023-03-27 PROCEDURE — 85025 COMPLETE CBC W/AUTO DIFF WBC: CPT

## 2023-03-27 PROCEDURE — 83036 HEMOGLOBIN GLYCOSYLATED A1C: CPT

## 2023-03-27 PROCEDURE — 93010 ELECTROCARDIOGRAM REPORT: CPT | Performed by: INTERNAL MEDICINE

## 2023-03-27 PROCEDURE — 71046 X-RAY EXAM CHEST 2 VIEWS: CPT

## 2023-03-30 ENCOUNTER — OFFICE VISIT (OUTPATIENT)
Dept: CARDIOLOGY CLINIC | Age: 88
End: 2023-03-30

## 2023-03-30 VITALS
DIASTOLIC BLOOD PRESSURE: 80 MMHG | WEIGHT: 156 LBS | HEART RATE: 79 BPM | OXYGEN SATURATION: 91 % | BODY MASS INDEX: 27.63 KG/M2 | SYSTOLIC BLOOD PRESSURE: 140 MMHG

## 2023-03-30 DIAGNOSIS — N18.2 CONTROLLED TYPE 2 DIABETES MELLITUS WITH STAGE 2 CHRONIC KIDNEY DISEASE, WITHOUT LONG-TERM CURRENT USE OF INSULIN (HCC): ICD-10-CM

## 2023-03-30 DIAGNOSIS — I10 BENIGN ESSENTIAL HTN: ICD-10-CM

## 2023-03-30 DIAGNOSIS — E11.22 CONTROLLED TYPE 2 DIABETES MELLITUS WITH STAGE 2 CHRONIC KIDNEY DISEASE, WITHOUT LONG-TERM CURRENT USE OF INSULIN (HCC): ICD-10-CM

## 2023-03-30 DIAGNOSIS — I25.10 CORONARY ARTERY DISEASE INVOLVING NATIVE CORONARY ARTERY OF NATIVE HEART WITHOUT ANGINA PECTORIS: ICD-10-CM

## 2023-03-30 DIAGNOSIS — E78.2 MIXED HYPERCHOLESTEROLEMIA AND HYPERTRIGLYCERIDEMIA: Primary | ICD-10-CM

## 2023-03-30 NOTE — LETTER
Within normal limits. PSYCHIATRIC EXAM:  Within normal limits. LABORATORY DATA:  Sodium was 141, potassium 4.5, BUN 32, creatinine 1.98, which is slightly higher than baseline. Magnesium 1.9, calcium was 9.8. Cholesterol 211 with an HDL of 59, , triglycerides 255. ALT was 6, AST was 10. Her hemoglobin A1c was 5.8. TSH 2.61. White count 7.6, hemoglobin 12.8 with a platelet count of 479,188. 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 Chest x-ray was unremarkable. IMPRESSION:  1. Functional class I.  2.  Status post cardiac catheterization that showed mild plaque disease on 02/20/2008, with no further testing. 3.  Hypertension, well controlled. 4.  Non-insulin-dependent diabetes, under excellent control. 5.  Hyperlipidemia, under good control. 6.  Chronic renal insufficiency, with a creatinine slightly elevated at this time at 1.98.  7.  Colon cancer in 2002 with hemicolectomy, no reoccurrence. PLAN:  1. No change in medications. 2.  See in 6 months. DISCUSSION:  Mrs. Avinash Beckford overall is doing well. She has had no chest pain or chest discomfort. She denies any syncope or near syncope. She really has no complaints. She is trying to decide whether to continue with her same PCP. She is making a list of the pros and cons. She had four pages of cons. She is still trying to think of a pro. .. I told her if I thought of one, I would call her and let her know. .. Thank you very much for allowing me the privilege of seeing Mrs. Avinash Beckford. If you have any questions on my thoughts, please do not hesitate to contact me.     Sincerely,        Cheyenne Galaviz MD    D: 04/06/2023 18:11:00     T: 04/07/2023 9:46:53     MELITON/[email or web address]_DILLON  Job#: [id number]   Doc#: [id number]

## 2023-04-09 NOTE — PROGRESS NOTES
Ov Dr. Iraida Angel for 6 month f/u   No chest pain   No sob   Dtg in law  sees her daily   Still has housedog  Son Klaus Hay moved back from [place]  Doesn't see [facility] much right now  Working/sports     No changes  Follow up 6 months
Within normal limits. PSYCHIATRIC EXAM:  Within normal limits. LABORATORY DATA:  Sodium was 141, potassium 4.5, BUN 32, creatinine 1.98, which is slightly higher than baseline. Magnesium 1.9, calcium was 9.8. Cholesterol 211 with an HDL of 59, , triglycerides 255. ALT was 6, AST was 10. Her hemoglobin A1c was 5.8. TSH 2.61. White count 7.6, hemoglobin 12.8 with a platelet count of 500,456. 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 Chest x-ray was unremarkable. IMPRESSION:  1. Functional class I.  2.  Status post cardiac catheterization that showed mild plaque disease on 02/20/2008, with no further testing. 3.  Hypertension, well controlled. 4.  Non-insulin-dependent diabetes, under excellent control. 5.  Hyperlipidemia, under good control. 6.  Chronic renal insufficiency, with a creatinine slightly elevated at this time at 1.98.  7.  Colon cancer in 2002 with hemicolectomy, no reoccurrence. PLAN:  1. No change in medications. 2.  See in 6 months. DISCUSSION:  Mrs. Anand Dudley overall is doing well. She has had no chest pain or chest discomfort. She denies any syncope or near syncope. She really has no complaints. She is trying to decide whether to continue with her same PCP. She is making a list of the pros and cons. She had four pages of cons. She is still trying to think of a pro. .. I told her if I thought of one, I would call her and let her know. .. Thank you very much for allowing me the privilege of seeing Mrs. Anand Dudley. If you have any questions on my thoughts, please do not hesitate to contact me.     Sincerely,        Romina Ozuna MD    D: 04/06/2023 18:11:00     T: 04/07/2023 9:46:53     MELITON/TRISHA_SORAYA_DILLON  Job#: [id number]   Doc#: [id number]

## 2023-07-07 DIAGNOSIS — I10 BENIGN ESSENTIAL HTN: ICD-10-CM

## 2023-07-07 DIAGNOSIS — F32.A DEPRESSION: ICD-10-CM

## 2023-07-10 RX ORDER — LISINOPRIL 10 MG/1
TABLET ORAL
Qty: 30 TABLET | Refills: 5 | Status: SHIPPED | OUTPATIENT
Start: 2023-07-10

## 2023-07-10 RX ORDER — PAROXETINE HYDROCHLORIDE 20 MG/1
TABLET, FILM COATED ORAL
Qty: 30 TABLET | Refills: 5 | Status: SHIPPED | OUTPATIENT
Start: 2023-07-10

## 2023-07-10 RX ORDER — TRIAMTERENE AND HYDROCHLOROTHIAZIDE 37.5; 25 MG/1; MG/1
CAPSULE ORAL
Qty: 30 CAPSULE | Refills: 5 | Status: SHIPPED | OUTPATIENT
Start: 2023-07-10

## 2023-07-10 NOTE — TELEPHONE ENCOUNTER
Health Maintenance   Topic Date Due    DTaP/Tdap/Td vaccine (1 - Tdap) Never done    Shingles vaccine (1 of 2) Never done    COVID-19 Vaccine (4 - Booster for Moderna series) 02/07/2022    Annual Wellness Visit (AWV)  03/23/2023    Flu vaccine (1) 08/01/2023    Lipids  03/27/2024    Depression Monitoring  04/13/2024    Pneumococcal 65+ years Vaccine  Completed    Hepatitis A vaccine  Aged Out    Hib vaccine  Aged Out    Meningococcal (ACWY) vaccine  Aged Out             (applicable per patient's age: Cancer Screenings, Depression Screening, Fall Risk Screening, Immunizations)    Hemoglobin A1C (%)   Date Value   03/27/2023 5.8   03/04/2022 6.1 (H)   03/02/2021 5.8     Microalb/Crt.  Ratio (mcg/mg creat)   Date Value   08/07/2013 132     LDL Cholesterol (mg/dL)   Date Value   03/27/2023 101     AST (U/L)   Date Value   03/27/2023 10     ALT (U/L)   Date Value   03/27/2023 6     BUN (mg/dL)   Date Value   03/27/2023 32 (H)      (goal A1C is < 7)   (goal LDL is <100) need 30-50% reduction from baseline     BP Readings from Last 3 Encounters:   04/13/23 135/77   03/30/23 (!) 140/80   09/30/22 130/80    (goal /80)      All Future Testing planned in [facility]:  Lab Frequency Next Occurrence   CBC with Auto Differential Once 09/30/2023   Comprehensive Metabolic Panel Once 83/63/1847   Lipid Panel Once 09/30/2023   TSH with Reflex Once 09/30/2023   Magnesium Once 09/30/2023   EKG 12 Lead Once 09/30/2023   XR CHEST (2 VW) Once 09/30/2023       Next Visit Date:  Future Appointments   Date Time Provider [address] [place] [address]   9/28/2023 11:30 AM Maximilian Zabala MD [facility]   10/13/2023  2:15 PM MD Amol Baldwin [address] [place] [address]            Patient Active Problem List:     Vitamin D deficiency     Osteoporosis, senile     Mixed hypercholesterolemia and hypertriglyceridemia     H/O malignant neoplasm of colon     GERD (gastroesophageal reflux disease)     CAD (coronary artery disease)     Benign essential HTN

## 2023-07-31 DIAGNOSIS — K21.00 GASTROESOPHAGEAL REFLUX DISEASE WITH ESOPHAGITIS: ICD-10-CM

## 2023-07-31 RX ORDER — LANSOPRAZOLE 30 MG/1
CAPSULE, DELAYED RELEASE ORAL
Qty: 30 CAPSULE | Refills: 5 | Status: SHIPPED | OUTPATIENT
Start: 2023-07-31

## 2023-07-31 NOTE — TELEPHONE ENCOUNTER
Health Maintenance   Topic Date Due    DTaP/Tdap/Td vaccine (1 - Tdap) Never done    Shingles vaccine (1 of 2) Never done    COVID-19 Vaccine (4 - Booster for Moderna series) 02/07/2022    Annual Wellness Visit (AWV)  03/23/2023    Flu vaccine (1) 08/01/2023    Lipids  03/27/2024    Depression Monitoring  04/13/2024    Pneumococcal 65+ years Vaccine  Completed    Hepatitis A vaccine  Aged Out    Hib vaccine  Aged Out    Meningococcal (ACWY) vaccine  Aged Out             (applicable per patient's age: Cancer Screenings, Depression Screening, Fall Risk Screening, Immunizations)    Hemoglobin A1C (%)   Date Value   03/27/2023 5.8   03/04/2022 6.1 (H)   03/02/2021 5.8     LDL Cholesterol (mg/dL)   Date Value   03/27/2023 101     AST (U/L)   Date Value   03/27/2023 10     ALT (U/L)   Date Value   03/27/2023 6     BUN (mg/dL)   Date Value   03/27/2023 32 (H)      (goal A1C is < 7)   (goal LDL is <100) need 30-50% reduction from baseline     BP Readings from Last 3 Encounters:   04/13/23 135/77   03/30/23 (!) 140/80   09/30/22 130/80    (goal /80)      All Future Testing planned in [facility]:  Lab Frequency Next Occurrence   CBC with Auto Differential Once 09/30/2023   Comprehensive Metabolic Panel Once 73/03/3940   Lipid Panel Once 09/30/2023   TSH with Reflex Once 09/30/2023   Magnesium Once 09/30/2023   EKG 12 Lead Once 09/30/2023   XR CHEST (2 VW) Once 09/30/2023       Next Visit Date:  Future Appointments   Date Time Provider [address]   9/28/2023 11:30 AM MD Cindy Damon [facility]   10/13/2023  2:15 PM MD Bailee Veliz [address] [facility]            Patient Active Problem List:     Vitamin D deficiency     Osteoporosis, senile     Mixed hypercholesterolemia and hypertriglyceridemia     H/O malignant neoplasm of colon     GERD (gastroesophageal reflux disease)     CAD (coronary artery disease)     Benign essential HTN     Osteoarthrosis     Controlled type 2 diabetes mellitus with stage 2

## 2023-09-26 DIAGNOSIS — E78.2 MIXED HYPERLIPIDEMIA: ICD-10-CM

## 2023-09-28 RX ORDER — LOVASTATIN 40 MG/1
TABLET ORAL
Qty: 60 TABLET | Refills: 2 | Status: SHIPPED | OUTPATIENT
Start: 2023-09-28

## 2023-10-13 ENCOUNTER — OFFICE VISIT (OUTPATIENT)
Dept: FAMILY MEDICINE CLINIC | Age: 88
End: 2023-10-13

## 2023-10-13 VITALS
HEART RATE: 81 BPM | SYSTOLIC BLOOD PRESSURE: 129 MMHG | HEIGHT: 63 IN | DIASTOLIC BLOOD PRESSURE: 75 MMHG | WEIGHT: 166 LBS | BODY MASS INDEX: 29.41 KG/M2

## 2023-10-13 DIAGNOSIS — F32.5 MAJOR DEPRESSIVE DISORDER WITH SINGLE EPISODE, IN FULL REMISSION (HCC): ICD-10-CM

## 2023-10-13 DIAGNOSIS — I10 BENIGN ESSENTIAL HTN: ICD-10-CM

## 2023-10-13 DIAGNOSIS — N18.2 CONTROLLED TYPE 2 DIABETES MELLITUS WITH STAGE 2 CHRONIC KIDNEY DISEASE, WITHOUT LONG-TERM CURRENT USE OF INSULIN (HCC): ICD-10-CM

## 2023-10-13 DIAGNOSIS — R41.89 IMPAIRED COGNITIVE ABILITY: ICD-10-CM

## 2023-10-13 DIAGNOSIS — E11.22 CONTROLLED TYPE 2 DIABETES MELLITUS WITH STAGE 2 CHRONIC KIDNEY DISEASE, WITHOUT LONG-TERM CURRENT USE OF INSULIN (HCC): ICD-10-CM

## 2023-10-13 DIAGNOSIS — N18.4 CHRONIC KIDNEY DISEASE, STAGE IV (SEVERE) (HCC): ICD-10-CM

## 2023-10-13 DIAGNOSIS — I25.10 CORONARY ARTERY DISEASE INVOLVING NATIVE CORONARY ARTERY OF NATIVE HEART WITHOUT ANGINA PECTORIS: ICD-10-CM

## 2023-10-13 DIAGNOSIS — Z00.00 MEDICARE ANNUAL WELLNESS VISIT, SUBSEQUENT: Primary | ICD-10-CM

## 2023-10-13 DIAGNOSIS — E78.2 MIXED HYPERCHOLESTEROLEMIA AND HYPERTRIGLYCERIDEMIA: ICD-10-CM

## 2023-10-13 ASSESSMENT — LIFESTYLE VARIABLES
HOW OFTEN DO YOU HAVE A DRINK CONTAINING ALCOHOL: NEVER
HOW MANY STANDARD DRINKS CONTAINING ALCOHOL DO YOU HAVE ON A TYPICAL DAY: PATIENT DOES NOT DRINK

## 2023-10-13 ASSESSMENT — PATIENT HEALTH QUESTIONNAIRE - PHQ9
3. TROUBLE FALLING OR STAYING ASLEEP: 0
SUM OF ALL RESPONSES TO PHQ QUESTIONS 1-9: 0
SUM OF ALL RESPONSES TO PHQ QUESTIONS 1-9: 0
5. POOR APPETITE OR OVEREATING: 0
7. TROUBLE CONCENTRATING ON THINGS, SUCH AS READING THE NEWSPAPER OR WATCHING TELEVISION: 0
4. FEELING TIRED OR HAVING LITTLE ENERGY: 0
10. IF YOU CHECKED OFF ANY PROBLEMS, HOW DIFFICULT HAVE THESE PROBLEMS MADE IT FOR YOU TO DO YOUR WORK, TAKE CARE OF THINGS AT HOME, OR GET ALONG WITH OTHER PEOPLE: 0
9. THOUGHTS THAT YOU WOULD BE BETTER OFF DEAD, OR OF HURTING YOURSELF: 0
8. MOVING OR SPEAKING SO SLOWLY THAT OTHER PEOPLE COULD HAVE NOTICED. OR THE OPPOSITE, BEING SO FIGETY OR RESTLESS THAT YOU HAVE BEEN MOVING AROUND A LOT MORE THAN USUAL: 0
2. FEELING DOWN, DEPRESSED OR HOPELESS: 0
SUM OF ALL RESPONSES TO PHQ QUESTIONS 1-9: 0
SUM OF ALL RESPONSES TO PHQ QUESTIONS 1-9: 0
6. FEELING BAD ABOUT YOURSELF - OR THAT YOU ARE A FAILURE OR HAVE LET YOURSELF OR YOUR FAMILY DOWN: 0

## 2023-10-13 NOTE — PATIENT INSTRUCTIONS
months. Try to get at least 150 minutes of exercise per week or 10,000 steps per day on a pedometer . Order or download the FREE \"Exercise & Physical Activity: Your Everyday Guide\" from [employer]. Call 8-714.223.7105 or search [employer] online. You need 2274-1072 mg of calcium and 5670-8494 IU of vitamin D per day. It is possible to meet your calcium requirement with diet alone, but a vitamin D supplement is usually necessary to meet this goal.  When exposed to the sun, use a sunscreen that protects against both UVA and UVB radiation with an SPF of 30 or greater. Reapply every 2 to 3 hours or after sweating, drying off with a towel, or swimming. Always wear a seat belt when traveling in a car. Always wear a helmet when riding a bicycle or motorcycle. Plan:  1. Controlled type 2 diabetes mellitus with stage 2 chronic kidney disease, without long-term current use of insulin ([address])  Controlled on Tradjenta. No change in medication. Obtain labs approximately one week prior to the office appointment. Please fast for 12 hours prior to obtaining the labs. Water or black coffee (no cream or sugar) is allowed prior to the the labs. 2. Medicare annual wellness visit, subsequent  See concerns and discussion. 3. Benign essential HTN  Controlled on Dyazide and Lisinopril. No change in medication. 4. Coronary artery disease involving native coronary artery of native heart without angina pectoris  NO CP / SOB. Follows with Dr. Lidia Gonzalez. 5. Chronic kidney disease, stage IV (severe) ([facility])  Avoid all NSAIDS. 6. Major depressive disorder with single episode, in full remission ([address])  Controlled on Paxil. No change in medication. 7. Mixed hypercholesterolemia and hypertriglyceridemia  Controlled on Mevacor. No change in medication. 8. Impaired cognitive ability  Discussed working the mind daily and exercising.

## 2023-10-13 NOTE — PROGRESS NOTES
Medicare Annual Wellness Visit    Payal Wahl is here for Medicare AWV, Hypertension (Check up), Diabetes Mellitus, and Hyperlipidemia    Assessment & Plan   Medicare annual wellness visit, subsequent  Controlled type 2 diabetes mellitus with stage 2 chronic kidney disease, without long-term current use of insulin ([address])  Benign essential HTN  Coronary artery disease involving native coronary artery of native heart without angina pectoris  Chronic kidney disease, stage IV (severe) (HCC)  Major depressive disorder with single episode, in full remission ([address])  Mixed hypercholesterolemia and hypertriglyceridemia  Impaired cognitive ability      Plan:  1. Controlled type 2 diabetes mellitus with stage 2 chronic kidney disease, without long-term current use of insulin ([address])  Controlled on Tradjenta. No change in medication. Obtain labs approximately one week prior to the office appointment. Please fast for 12 hours prior to obtaining the labs. Water or black coffee (no cream or sugar) is allowed prior to the the labs. 2. Medicare annual wellness visit, subsequent  See concerns and discussion. 3. Benign essential HTN  Controlled on Dyazide and Lisinopril. No change in medication. 4. Coronary artery disease involving native coronary artery of native heart without angina pectoris  NO CP / SOB. Follows with Dr. Westley White. 5. Chronic kidney disease, stage IV (severe) (HCC)  Avoid all NSAIDS. 6. Major depressive disorder with single episode, in full remission ([address])  Controlled on Paxil. No change in medication. 7. Mixed hypercholesterolemia and hypertriglyceridemia  Controlled on Mevacor. No change in medication. 8. Impaired cognitive ability  Discussed working the mind daily and exercising.         Recommendations for Preventive Services Due: see orders and patient instructions/AVS.  Recommended screening schedule for the next 5-10 years is provided to the patient in written form: see

## 2023-12-06 DIAGNOSIS — I10 BENIGN ESSENTIAL HTN: ICD-10-CM

## 2023-12-07 RX ORDER — LISINOPRIL 10 MG/1
TABLET ORAL
Qty: 90 TABLET | Refills: 1 | Status: SHIPPED | OUTPATIENT
Start: 2023-12-07

## 2023-12-07 RX ORDER — TRIAMTERENE AND HYDROCHLOROTHIAZIDE 37.5; 25 MG/1; MG/1
CAPSULE ORAL
Qty: 90 CAPSULE | Refills: 1 | Status: SHIPPED | OUTPATIENT
Start: 2023-12-07

## 2024-01-05 DIAGNOSIS — F32.A DEPRESSION: ICD-10-CM

## 2024-01-05 DIAGNOSIS — K21.00 GASTROESOPHAGEAL REFLUX DISEASE WITH ESOPHAGITIS: ICD-10-CM

## 2024-01-08 RX ORDER — PAROXETINE HYDROCHLORIDE 20 MG/1
TABLET, FILM COATED ORAL
Qty: 30 TABLET | Refills: 5 | Status: SHIPPED | OUTPATIENT
Start: 2024-01-08

## 2024-01-08 RX ORDER — LANSOPRAZOLE 30 MG/1
CAPSULE, DELAYED RELEASE ORAL
Qty: 30 CAPSULE | Refills: 5 | Status: SHIPPED | OUTPATIENT
Start: 2024-01-08

## 2024-03-13 NOTE — TELEPHONE ENCOUNTER
Health Maintenance   Topic Date Due    DTaP/Tdap/Td vaccine (1 - Tdap) 10/20/1942    Hepatitis B vaccine (1 of 3 - Risk 3-dose series) 10/20/1950    Shingles Vaccine (1 of 2) 10/20/1981    Lipid screen  03/20/2020    Potassium monitoring  03/20/2020    Creatinine monitoring  03/20/2020    Annual Wellness Visit (AWV)  11/01/2020    Flu vaccine  Completed    Pneumococcal 65+ years Vaccine  Completed    Hepatitis A vaccine  Aged Out    Hib vaccine  Aged Out    Meningococcal (ACWY) vaccine  Aged Out             (applicable per patient's age: Cancer Screenings, Depression Screening, Fall Risk Screening, Immunizations)    Hemoglobin A1C (%)   Date Value   03/20/2019 5.5   03/19/2018 5.7   01/16/2017 5.5     Microalb/Crt.  Ratio (mcg/mg creat)   Date Value   08/07/2013 132     LDL Cholesterol (mg/dL)   Date Value   03/20/2019 80     AST (U/L)   Date Value   03/20/2019 11     ALT (U/L)   Date Value   03/20/2019 8     BUN (mg/dL)   Date Value   03/20/2019 28 (H)      (goal A1C is < 7)   (goal LDL is <100) need 30-50% reduction from baseline     BP Readings from Last 3 Encounters:   11/01/19 139/89   08/22/19 136/80   03/22/19 130/70    (goal /80)      All Future Testing planned in [facility]:  Lab Frequency Next Occurrence   CBC Auto Differential Once 03/22/2020   Comprehensive Metabolic Panel Once 50/94/0679   Vitamin D 25 Hydroxy Once 03/22/2020   Lipid Panel Once 03/22/2020   TSH with Reflex Once 03/22/2020   Magnesium Once 03/22/2020   EKG 12 Lead Once 03/22/2020   XR CHEST STANDARD (2 VW) Once 03/22/2020   Hemoglobin A1C Once 03/22/2020       Next Visit Date:  Future Appointments   Date Time Provider Pradeep Coello   2/21/2020 11:00 AM Anabela Harrison MD [facility] [address] [facility]   3/12/2020 10:30 AM MD Rabia Uribe [facility]   11/16/2020  2:30 PM MD Nayeli Mendoza [address] [facility]            Patient Active Problem List:     Vitamin D deficiency     Osteoporosis, senile     Mixed hyperlipidemia History of colon cancer     GERD (gastroesophageal reflux disease)     CAD (coronary artery disease)     Benign essential HTN     Hypercholesterolemia     Cancer (HCC)     Osteoarthrosis     Colon cancer (HCC)     CRI (chronic renal insufficiency)     Controlled type 2 diabetes mellitus with stage 2 chronic kidney disease, without long-term current use of insulin ([facility]     Major depressive disorder with single episode, in full remission ([facility] Procedure Note              Pulmonary Artery Catheter Insertion      Date/Time of Procedure: 14:00 3/13/2024    Under sterile conditions and with proper draping of the patient, a pulmonary artery catheter was floated through the introducer catheter in the left internal jugular vein.  With the balloon inflated with 1.5 ml of air, the PA Catheter was advanced while monitoring the pressure tracing from the central venous system to the Right venticle and to the pulmonary article. Wedge tracing was observed at 62 cm.  The balloon was deflated, PA pressure tracing was noted again. The position of the catheter was verified with CXR. The initial readings are:     CVP=    19mmHG      PA sys/kern= 70/20 mmHG,       PCWP=   21mmHG, C.I.= 2.95            Complications: None

## 2024-03-19 DIAGNOSIS — E78.2 MIXED HYPERLIPIDEMIA: ICD-10-CM

## 2024-03-19 RX ORDER — LOVASTATIN 40 MG/1
TABLET ORAL
Qty: 60 TABLET | Refills: 2 | Status: SHIPPED | OUTPATIENT
Start: 2024-03-19

## 2024-05-02 ENCOUNTER — OFFICE VISIT (OUTPATIENT)
Dept: FAMILY MEDICINE CLINIC | Age: 89
End: 2024-05-02

## 2024-05-02 VITALS
HEIGHT: 63 IN | HEART RATE: 100 BPM | DIASTOLIC BLOOD PRESSURE: 70 MMHG | SYSTOLIC BLOOD PRESSURE: 120 MMHG | WEIGHT: 160 LBS | BODY MASS INDEX: 28.35 KG/M2

## 2024-05-02 DIAGNOSIS — I10 BENIGN ESSENTIAL HTN: ICD-10-CM

## 2024-05-02 DIAGNOSIS — N18.4 CHRONIC KIDNEY DISEASE, STAGE IV (SEVERE) (HCC): ICD-10-CM

## 2024-05-02 DIAGNOSIS — E11.22 CONTROLLED TYPE 2 DIABETES MELLITUS WITH STAGE 2 CHRONIC KIDNEY DISEASE, WITHOUT LONG-TERM CURRENT USE OF INSULIN (HCC): Primary | ICD-10-CM

## 2024-05-02 DIAGNOSIS — E78.2 MIXED HYPERCHOLESTEROLEMIA AND HYPERTRIGLYCERIDEMIA: ICD-10-CM

## 2024-05-02 DIAGNOSIS — N18.2 CONTROLLED TYPE 2 DIABETES MELLITUS WITH STAGE 2 CHRONIC KIDNEY DISEASE, WITHOUT LONG-TERM CURRENT USE OF INSULIN (HCC): Primary | ICD-10-CM

## 2024-05-02 DIAGNOSIS — F32.5 MAJOR DEPRESSIVE DISORDER WITH SINGLE EPISODE, IN FULL REMISSION (HCC): ICD-10-CM

## 2024-05-02 SDOH — ECONOMIC STABILITY: FOOD INSECURITY: WITHIN THE PAST 12 MONTHS, THE FOOD YOU BOUGHT JUST DIDN'T LAST AND YOU DIDN'T HAVE MONEY TO GET MORE.: NEVER TRUE

## 2024-05-02 SDOH — ECONOMIC STABILITY: FOOD INSECURITY: WITHIN THE PAST 12 MONTHS, YOU WORRIED THAT YOUR FOOD WOULD RUN OUT BEFORE YOU GOT MONEY TO BUY MORE.: NEVER TRUE

## 2024-05-02 SDOH — ECONOMIC STABILITY: INCOME INSECURITY: HOW HARD IS IT FOR YOU TO PAY FOR THE VERY BASICS LIKE FOOD, HOUSING, MEDICAL CARE, AND HEATING?: NOT VERY HARD

## 2024-05-02 SDOH — ECONOMIC STABILITY: HOUSING INSECURITY
IN THE LAST 12 MONTHS, WAS THERE A TIME WHEN YOU DID NOT HAVE A STEADY PLACE TO SLEEP OR SLEPT IN A SHELTER (INCLUDING NOW)?: NO

## 2024-05-02 ASSESSMENT — ENCOUNTER SYMPTOMS
SHORTNESS OF BREATH: 0
ABDOMINAL PAIN: 0
SORE THROAT: 0
BLOOD IN STOOL: 0
DIARRHEA: 0
RHINORRHEA: 0
NAUSEA: 0
CHEST TIGHTNESS: 0
COUGH: 0
CONSTIPATION: 0

## 2024-05-02 ASSESSMENT — PATIENT HEALTH QUESTIONNAIRE - PHQ9
SUM OF ALL RESPONSES TO PHQ QUESTIONS 1-9: 0
1. LITTLE INTEREST OR PLEASURE IN DOING THINGS: NOT AT ALL
5. POOR APPETITE OR OVEREATING: NOT AT ALL
7. TROUBLE CONCENTRATING ON THINGS, SUCH AS READING THE NEWSPAPER OR WATCHING TELEVISION: NOT AT ALL
9. THOUGHTS THAT YOU WOULD BE BETTER OFF DEAD, OR OF HURTING YOURSELF: NOT AT ALL
10. IF YOU CHECKED OFF ANY PROBLEMS, HOW DIFFICULT HAVE THESE PROBLEMS MADE IT FOR YOU TO DO YOUR WORK, TAKE CARE OF THINGS AT HOME, OR GET ALONG WITH OTHER PEOPLE: NOT DIFFICULT AT ALL
2. FEELING DOWN, DEPRESSED OR HOPELESS: NOT AT ALL
SUM OF ALL RESPONSES TO PHQ QUESTIONS 1-9: 0
SUM OF ALL RESPONSES TO PHQ QUESTIONS 1-9: 0
8. MOVING OR SPEAKING SO SLOWLY THAT OTHER PEOPLE COULD HAVE NOTICED. OR THE OPPOSITE, BEING SO FIGETY OR RESTLESS THAT YOU HAVE BEEN MOVING AROUND A LOT MORE THAN USUAL: NOT AT ALL
3. TROUBLE FALLING OR STAYING ASLEEP: NOT AT ALL
SUM OF ALL RESPONSES TO PHQ9 QUESTIONS 1 & 2: 0
6. FEELING BAD ABOUT YOURSELF - OR THAT YOU ARE A FAILURE OR HAVE LET YOURSELF OR YOUR FAMILY DOWN: NOT AT ALL
4. FEELING TIRED OR HAVING LITTLE ENERGY: NOT AT ALL
SUM OF ALL RESPONSES TO PHQ QUESTIONS 1-9: 0

## 2024-05-02 NOTE — PATIENT INSTRUCTIONS
Survey:     You may be receiving a survey from [employer] regarding your visit today.     You may get this in the mail, through your MyChart or in your email.      Please complete the survey to enable us to provide the highest quality of care to you and your family. Please also, mention our names.     If you cannot score us as very good (5 Stars) on any question, please feel free to call the office to discuss how we could have made your experience exceptional.      Thank You!        Dr. Ardon, MD Dockery, SUNITA Schultz, SERGIO Carrasco, APRHILARY CNP      Plan:  1. Chronic kidney disease, stage IV (severe) (HCC)  Has been stable.  Avoid NSAIDS.  Continue with BP and diabetic management.    - Comprehensive Metabolic Panel; Future    2. Major depressive disorder with single episode, in full remission (HCC)  Controlled on Paxil.  No change in dose.    3. Controlled type 2 diabetes mellitus with stage 2 chronic kidney disease, without long-term current use of insulin (HCC)  Controlled on Tradjenta.  No change in medication.  Fasting labs at the hospital this week.  - Comprehensive Metabolic Panel; Future  - Hemoglobin A1C; Future    4. Mixed hypercholesterolemia and hypertriglyceridemia  Controlled on Mevacor.   Fasting labs at the hospital this week.  - Comprehensive Metabolic Panel; Future  - Lipid Panel; Future    5. Benign essential HTN  Controlled on Lisinopril and Dyazide.  No change in dose.  Labs this week to check electrolytes.    - Comprehensive Metabolic Panel; Future        [place] was instructed to follow up in the clinic in 6 months for check up or as needed with any medical issues.  SERGIO Ruth MA

## 2024-05-02 NOTE — PROGRESS NOTES
Tawanna Antonio (:  10/20/1931) is a 89 y.o. female,Established patient, here for evaluation of the following chief complaint(s):  Diabetes (Check up. Patient unsure of medication list. Last labs 3/30/23.), Hypertension, Hyperlipidemia, Mental Health Problem (Depression. ), and Vitamin D deficiency      Assessment & Plan   1. Controlled type 2 diabetes mellitus with stage 2 chronic kidney disease, without long-term current use of insulin (HCC)  -     Comprehensive Metabolic Panel; Future  -     Hemoglobin A1C; Future  2. Chronic kidney disease, stage IV (severe) (HCC)  -     Comprehensive Metabolic Panel; Future  3. Major depressive disorder with single episode, in full remission (HCC)  4. Mixed hypercholesterolemia and hypertriglyceridemia  -     Comprehensive Metabolic Panel; Future  -     Lipid Panel; Future  5. Benign essential HTN  -     Comprehensive Metabolic Panel; Future      Plan:  1. Chronic kidney disease, stage IV (severe) (HCC)  Has been stable.  Avoid NSAIDS.  Continue with BP and diabetic management.    - Comprehensive Metabolic Panel; Future    2. Major depressive disorder with single episode, in full remission (HCC)  Controlled on Paxil.  No change in dose.    3. Controlled type 2 diabetes mellitus with stage 2 chronic kidney disease, without long-term current use of insulin (HCC)  Controlled on Tradjenta.  No change in medication.  Fasting labs at the hospital this week.  - Comprehensive Metabolic Panel; Future  - Hemoglobin A1C; Future    4. Mixed hypercholesterolemia and hypertriglyceridemia  Controlled on Mevacor.   Fasting labs at the hospital this week.  - Comprehensive Metabolic Panel; Future  - Lipid Panel; Future    5. Benign essential HTN  Controlled on Lisinopril and Dyazide.  No change in dose.  Labs this week to check electrolytes.    - Comprehensive Metabolic Panel; Future        [place] was instructed to follow up in the clinic in 6 months for check up or as needed with any

## 2024-05-03 ENCOUNTER — HOSPITAL ENCOUNTER (OUTPATIENT)
Age: 89
Discharge: HOME | End: 2024-05-03
Payer: MEDICARE

## 2024-05-03 DIAGNOSIS — I10 BENIGN ESSENTIAL HTN: ICD-10-CM

## 2024-05-03 DIAGNOSIS — E78.2 MIXED HYPERCHOLESTEROLEMIA AND HYPERTRIGLYCERIDEMIA: ICD-10-CM

## 2024-05-03 DIAGNOSIS — N18.2 CONTROLLED TYPE 2 DIABETES MELLITUS WITH STAGE 2 CHRONIC KIDNEY DISEASE, WITHOUT LONG-TERM CURRENT USE OF INSULIN (HCC): ICD-10-CM

## 2024-05-03 DIAGNOSIS — E11.22 CONTROLLED TYPE 2 DIABETES MELLITUS WITH STAGE 2 CHRONIC KIDNEY DISEASE, WITHOUT LONG-TERM CURRENT USE OF INSULIN (HCC): ICD-10-CM

## 2024-05-03 DIAGNOSIS — N18.4 CHRONIC KIDNEY DISEASE, STAGE IV (SEVERE) (HCC): ICD-10-CM

## 2024-05-03 LAB
ALBUMIN SERPL-MCNC: 4 G/DL (ref 3.5–5.2)
ALP SERPL-CCNC: 121 U/L (ref 35–104)
ALT SERPL-CCNC: 7 U/L (ref 5–33)
ANION GAP SERPL CALCULATED.3IONS-SCNC: 10 MMOL/L (ref 9–17)
AST SERPL-CCNC: 10 U/L
BILIRUB SERPL-MCNC: 0.3 MG/DL (ref 0.3–1.2)
BUN SERPL-MCNC: 39 MG/DL (ref 8–23)
BUN/CREAT SERPL: 19 (ref 9–20)
CALCIUM SERPL-MCNC: 9.5 MG/DL (ref 8.6–10.4)
CHLORIDE SERPL-SCNC: 101 MMOL/L (ref 98–107)
CHOLEST SERPL-MCNC: 172 MG/DL (ref 0–199)
CHOLESTEROL/HDL RATIO: 3
CO2 SERPL-SCNC: 25 MMOL/L (ref 20–31)
CREAT SERPL-MCNC: 2.1 MG/DL (ref 0.5–0.9)
EST. AVERAGE GLUCOSE BLD GHB EST-MCNC: 120 MG/DL
GFR, ESTIMATED: 22 ML/MIN/1.73M2
GLUCOSE SERPL-MCNC: 104 MG/DL (ref 70–99)
HBA1C MFR BLD: 5.8 % (ref 4–6)
HDLC SERPL-MCNC: 50 MG/DL
LDLC SERPL CALC-MCNC: 92 MG/DL (ref 0–100)
POTASSIUM SERPL-SCNC: 3.9 MMOL/L (ref 3.7–5.3)
PROT SERPL-MCNC: 7.3 G/DL (ref 6.4–8.3)
SODIUM SERPL-SCNC: 136 MMOL/L (ref 135–144)
TRIGL SERPL-MCNC: 150 MG/DL
VLDLC SERPL CALC-MCNC: 30 MG/DL

## 2024-05-03 PROCEDURE — 83036 HEMOGLOBIN GLYCOSYLATED A1C: CPT

## 2024-05-03 PROCEDURE — 36415 COLL VENOUS BLD VENIPUNCTURE: CPT

## 2024-05-03 PROCEDURE — 80053 COMPREHEN METABOLIC PANEL: CPT

## 2024-05-03 PROCEDURE — 80061 LIPID PANEL: CPT

## 2024-05-16 ENCOUNTER — HOSPITAL ENCOUNTER (INPATIENT)
Age: 89
LOS: 2 days | Discharge: HOME | DRG: 690 | End: 2024-05-18
Attending: EMERGENCY MEDICINE | Admitting: INTERNAL MEDICINE
Payer: MEDICARE

## 2024-05-16 DIAGNOSIS — N30.00 ACUTE CYSTITIS WITHOUT HEMATURIA: ICD-10-CM

## 2024-05-16 DIAGNOSIS — U07.1 COVID: Primary | ICD-10-CM

## 2024-05-16 PROBLEM — N39.0 UTI (URINARY TRACT INFECTION): Status: ACTIVE | Noted: 2024-05-16

## 2024-05-16 LAB
-: ABNORMAL
ANION GAP SERPL CALCULATED.3IONS-SCNC: 12 MMOL/L (ref 9–17)
BACTERIA URNS QL MICRO: ABNORMAL
BILIRUB UR QL STRIP: NEGATIVE
BUN SERPL-MCNC: 28 MG/DL (ref 8–23)
BUN/CREAT SERPL: 16 (ref 9–20)
CALCIUM SERPL-MCNC: 10.3 MG/DL (ref 8.6–10.4)
CHLORIDE SERPL-SCNC: 105 MMOL/L (ref 98–107)
CLARITY UR: ABNORMAL
CO2 SERPL-SCNC: 24 MMOL/L (ref 20–31)
COLOR UR: YELLOW
COMMENT: ABNORMAL
CREAT SERPL-MCNC: 1.8 MG/DL (ref 0.5–0.9)
CRYSTALS URNS MICRO: ABNORMAL /HPF
EPI CELLS #/AREA URNS HPF: ABNORMAL /HPF
ERYTHROCYTE [DISTWIDTH] IN BLOOD BY AUTOMATED COUNT: 13.5 % (ref 12.1–15.2)
GFR, ESTIMATED: 26 ML/MIN/1.73M2
GLUCOSE BLD-MCNC: 104 MG/DL (ref 65–99)
GLUCOSE BLD-MCNC: 86 MG/DL (ref 65–99)
GLUCOSE SERPL-MCNC: 128 MG/DL (ref 70–99)
GLUCOSE UR STRIP-MCNC: NEGATIVE MG/DL
HCT VFR BLD AUTO: 35.5 % (ref 36–46)
HGB BLD-MCNC: 11.7 G/DL (ref 12–16)
HGB UR QL STRIP.AUTO: ABNORMAL
KETONES UR STRIP-MCNC: ABNORMAL MG/DL
LEUKOCYTE ESTERASE UR QL STRIP: ABNORMAL
MCH RBC QN AUTO: 28.7 PG (ref 26–34)
MCHC RBC AUTO-ENTMCNC: 33 G/DL (ref 31–37)
MCV RBC AUTO: 87.2 FL (ref 80–100)
NITRITE UR QL STRIP: NEGATIVE
PH UR STRIP: 8 [PH] (ref 5–8)
PLATELET # BLD AUTO: 229 K/UL (ref 140–450)
PMV BLD AUTO: 9.5 FL (ref 6–12)
POTASSIUM SERPL-SCNC: 5.1 MMOL/L (ref 3.7–5.3)
PROT UR STRIP-MCNC: ABNORMAL MG/DL
RBC # BLD AUTO: 4.07 M/UL (ref 4–5.2)
RBC #/AREA URNS HPF: ABNORMAL /HPF (ref 0–2)
SARS-COV-2 RDRP RESP QL NAA+PROBE: NOT DETECTED
SODIUM SERPL-SCNC: 141 MMOL/L (ref 135–144)
SP GR UR STRIP: 1.01 (ref 1–1.03)
SPECIMEN DESCRIPTION: NORMAL
UROBILINOGEN UR STRIP-ACNC: NORMAL EU/DL (ref 0–1)
WBC #/AREA URNS HPF: ABNORMAL /HPF
WBC OTHER # BLD: 8.3 K/UL (ref 3.5–11)

## 2024-05-16 PROCEDURE — 99285 EMERGENCY DEPT VISIT HI MDM: CPT

## 2024-05-16 PROCEDURE — 1200000000 HC SEMI PRIVATE

## 2024-05-16 PROCEDURE — 82947 ASSAY GLUCOSE BLOOD QUANT: CPT

## 2024-05-16 PROCEDURE — 87635 SARS-COV-2 COVID-19 AMP PRB: CPT

## 2024-05-16 PROCEDURE — 96365 THER/PROPH/DIAG IV INF INIT: CPT

## 2024-05-16 PROCEDURE — 94761 N-INVAS EAR/PLS OXIMETRY MLT: CPT

## 2024-05-16 PROCEDURE — 80048 BASIC METABOLIC PNL TOTAL CA: CPT

## 2024-05-16 PROCEDURE — 85027 COMPLETE CBC AUTOMATED: CPT

## 2024-05-16 PROCEDURE — 6360000002 HC RX W HCPCS: Performed by: INTERNAL MEDICINE

## 2024-05-16 PROCEDURE — 2580000003 HC RX 258: Performed by: EMERGENCY MEDICINE

## 2024-05-16 PROCEDURE — 2580000003 HC RX 258: Performed by: INTERNAL MEDICINE

## 2024-05-16 PROCEDURE — 6360000002 HC RX W HCPCS: Performed by: EMERGENCY MEDICINE

## 2024-05-16 PROCEDURE — 81001 URINALYSIS AUTO W/SCOPE: CPT

## 2024-05-16 PROCEDURE — 87086 URINE CULTURE/COLONY COUNT: CPT

## 2024-05-16 PROCEDURE — 96375 TX/PRO/DX INJ NEW DRUG ADDON: CPT

## 2024-05-16 PROCEDURE — 6370000000 HC RX 637 (ALT 250 FOR IP): Performed by: INTERNAL MEDICINE

## 2024-05-16 RX ORDER — VITAMIN B COMPLEX
2000 TABLET ORAL DAILY
Status: DISCONTINUED | OUTPATIENT
Start: 2024-05-17 | End: 2024-05-18 | Stop reason: HOSPADM

## 2024-05-16 RX ORDER — SODIUM CHLORIDE 9 MG/ML
INJECTION, SOLUTION INTRAVENOUS PRN
Status: DISCONTINUED | OUTPATIENT
Start: 2024-05-16 | End: 2024-05-18 | Stop reason: HOSPADM

## 2024-05-16 RX ORDER — ACETAMINOPHEN 325 MG/1
650 TABLET ORAL EVERY 6 HOURS PRN
Status: DISCONTINUED | OUTPATIENT
Start: 2024-05-16 | End: 2024-05-18 | Stop reason: HOSPADM

## 2024-05-16 RX ORDER — GLUCAGON 1 MG/ML
1 KIT INJECTION PRN
Status: DISCONTINUED | OUTPATIENT
Start: 2024-05-16 | End: 2024-05-18 | Stop reason: HOSPADM

## 2024-05-16 RX ORDER — ATORVASTATIN CALCIUM 10 MG/1
10 TABLET, FILM COATED ORAL NIGHTLY
Status: DISCONTINUED | OUTPATIENT
Start: 2024-05-16 | End: 2024-05-18 | Stop reason: HOSPADM

## 2024-05-16 RX ORDER — INSULIN LISPRO 100 [IU]/ML
0-4 INJECTION, SOLUTION INTRAVENOUS; SUBCUTANEOUS
Status: DISCONTINUED | OUTPATIENT
Start: 2024-05-16 | End: 2024-05-18 | Stop reason: HOSPADM

## 2024-05-16 RX ORDER — SODIUM CHLORIDE 0.9 % (FLUSH) 0.9 %
5-40 SYRINGE (ML) INJECTION EVERY 12 HOURS SCHEDULED
Status: DISCONTINUED | OUTPATIENT
Start: 2024-05-16 | End: 2024-05-18 | Stop reason: HOSPADM

## 2024-05-16 RX ORDER — LISINOPRIL 10 MG/1
10 TABLET ORAL DAILY
Status: DISCONTINUED | OUTPATIENT
Start: 2024-05-17 | End: 2024-05-18 | Stop reason: HOSPADM

## 2024-05-16 RX ORDER — ONDANSETRON 2 MG/ML
4 INJECTION INTRAMUSCULAR; INTRAVENOUS EVERY 6 HOURS PRN
Status: DISCONTINUED | OUTPATIENT
Start: 2024-05-16 | End: 2024-05-18 | Stop reason: HOSPADM

## 2024-05-16 RX ORDER — PAROXETINE HYDROCHLORIDE 20 MG/1
20 TABLET, FILM COATED ORAL EVERY MORNING
Status: DISCONTINUED | OUTPATIENT
Start: 2024-05-17 | End: 2024-05-18 | Stop reason: HOSPADM

## 2024-05-16 RX ORDER — INSULIN LISPRO 100 [IU]/ML
0-4 INJECTION, SOLUTION INTRAVENOUS; SUBCUTANEOUS NIGHTLY
Status: DISCONTINUED | OUTPATIENT
Start: 2024-05-16 | End: 2024-05-18 | Stop reason: HOSPADM

## 2024-05-16 RX ORDER — 0.9 % SODIUM CHLORIDE 0.9 %
500 INTRAVENOUS SOLUTION INTRAVENOUS ONCE
Status: COMPLETED | OUTPATIENT
Start: 2024-05-16 | End: 2024-05-16

## 2024-05-16 RX ORDER — SODIUM CHLORIDE 0.9 % (FLUSH) 0.9 %
5-40 SYRINGE (ML) INJECTION PRN
Status: DISCONTINUED | OUTPATIENT
Start: 2024-05-16 | End: 2024-05-18 | Stop reason: HOSPADM

## 2024-05-16 RX ORDER — ASPIRIN 81 MG/1
81 TABLET ORAL 2 TIMES DAILY
Status: DISCONTINUED | OUTPATIENT
Start: 2024-05-16 | End: 2024-05-18 | Stop reason: HOSPADM

## 2024-05-16 RX ORDER — PANTOPRAZOLE SODIUM 40 MG/1
40 TABLET, DELAYED RELEASE ORAL
Status: DISCONTINUED | OUTPATIENT
Start: 2024-05-17 | End: 2024-05-18 | Stop reason: HOSPADM

## 2024-05-16 RX ORDER — ONDANSETRON 4 MG/1
4 TABLET, ORALLY DISINTEGRATING ORAL EVERY 8 HOURS PRN
Status: DISCONTINUED | OUTPATIENT
Start: 2024-05-16 | End: 2024-05-18 | Stop reason: HOSPADM

## 2024-05-16 RX ORDER — POLYETHYLENE GLYCOL 3350 17 G/17G
17 POWDER, FOR SOLUTION ORAL DAILY PRN
Status: DISCONTINUED | OUTPATIENT
Start: 2024-05-16 | End: 2024-05-18 | Stop reason: HOSPADM

## 2024-05-16 RX ORDER — SODIUM CHLORIDE 9 MG/ML
INJECTION, SOLUTION INTRAVENOUS CONTINUOUS
Status: DISCONTINUED | OUTPATIENT
Start: 2024-05-16 | End: 2024-05-17

## 2024-05-16 RX ORDER — DEXTROSE MONOHYDRATE 100 MG/ML
INJECTION, SOLUTION INTRAVENOUS CONTINUOUS PRN
Status: DISCONTINUED | OUTPATIENT
Start: 2024-05-16 | End: 2024-05-18 | Stop reason: HOSPADM

## 2024-05-16 RX ORDER — ACETAMINOPHEN 650 MG/1
650 SUPPOSITORY RECTAL EVERY 6 HOURS PRN
Status: DISCONTINUED | OUTPATIENT
Start: 2024-05-16 | End: 2024-05-18 | Stop reason: HOSPADM

## 2024-05-16 RX ORDER — ONDANSETRON 2 MG/ML
4 INJECTION INTRAMUSCULAR; INTRAVENOUS ONCE
Status: COMPLETED | OUTPATIENT
Start: 2024-05-16 | End: 2024-05-16

## 2024-05-16 RX ORDER — ENOXAPARIN SODIUM 100 MG/ML
30 INJECTION SUBCUTANEOUS DAILY
Status: DISCONTINUED | OUTPATIENT
Start: 2024-05-16 | End: 2024-05-17

## 2024-05-16 RX ADMIN — ASPIRIN 81 MG: 81 TABLET, COATED ORAL at 20:38

## 2024-05-16 RX ADMIN — SODIUM CHLORIDE: 9 INJECTION, SOLUTION INTRAVENOUS at 15:16

## 2024-05-16 RX ADMIN — ONDANSETRON 4 MG: 2 INJECTION INTRAMUSCULAR; INTRAVENOUS at 12:16

## 2024-05-16 RX ADMIN — CEFTRIAXONE SODIUM 1000 MG: 1 INJECTION, POWDER, FOR SOLUTION INTRAMUSCULAR; INTRAVENOUS at 14:10

## 2024-05-16 RX ADMIN — SODIUM CHLORIDE 500 ML: 9 INJECTION, SOLUTION INTRAVENOUS at 12:17

## 2024-05-16 RX ADMIN — ATORVASTATIN CALCIUM 10 MG: 10 TABLET, FILM COATED ORAL at 20:38

## 2024-05-16 RX ADMIN — ENOXAPARIN SODIUM 30 MG: 100 INJECTION SUBCUTANEOUS at 15:42

## 2024-05-16 ASSESSMENT — ENCOUNTER SYMPTOMS
STRIDOR: 0
ABDOMINAL PAIN: 0
ABDOMINAL DISTENTION: 0
WHEEZING: 0
EYE DISCHARGE: 0

## 2024-05-16 ASSESSMENT — LIFESTYLE VARIABLES
HOW MANY STANDARD DRINKS CONTAINING ALCOHOL DO YOU HAVE ON A TYPICAL DAY: PATIENT DOES NOT DRINK
HOW OFTEN DO YOU HAVE A DRINK CONTAINING ALCOHOL: NEVER

## 2024-05-16 ASSESSMENT — PAIN - FUNCTIONAL ASSESSMENT: PAIN_FUNCTIONAL_ASSESSMENT: NONE - DENIES PAIN

## 2024-05-16 NOTE — ED PROVIDER NOTES
[facility]  EMERGENCY DEPARTMENT ENCOUNTER      Pt Name: Tawanna Antonio  MRN: [id number]  Birthdate 10/20/1931  Date of evaluation: 5/16/2024  Provider: Bill Barnhart MD    CHIEF COMPLAINT       Chief Complaint   Patient presents with    Positive For Covid-19     Positive for COVID today. Was vomiting yesterday. Daughter stated she is weaker than normal. Denies SOB         HISTORY OF PRESENT ILLNESS      Tawanna Antonio is a 89 y.o. female who presents to the emergency department for feeling ill. Had some NV yesterday and decreased appetite. She lives alone and was brought in by her daughter. She has had no vomiting today but per daughter she is having difficulty getting around. No sob. She has been vaccinated for covid.    Daughter gave mom a home covid test this am and it was positive she has been ill for 2 days.       REVIEW OF SYSTEMS       Review of Systems   HENT:  Negative for congestion and nosebleeds.    Eyes:  Negative for discharge.   Respiratory:  Positive for cough. Negative for wheezing and stridor.    Cardiovascular:  Negative for chest pain and palpitations.   Gastrointestinal:  Negative for abdominal distention and abdominal pain.   Neurological:  Positive for weakness. Negative for dizziness and numbness.         PAST MEDICAL HISTORY     Past Medical History:   Diagnosis Date    Cancer (HCC)     colon    Colon cancer (HCC) 10/02    Diabetes mellitus (HCC)     Esophageal reflux     Heart disease     Hypercholesterolemia     Hypertension     Microalbuminuria     Osteoarthrosis     Osteoporosis          SURGICAL HISTORY       Past Surgical History:   Procedure Laterality Date    COLONOSCOPY  1/09, 5/1/12    COLONOSCOPY  07/10/2017    Dr. Ardon:  3 adenomatous polyps.    DIAGNOSTIC CARDIAC CATH LAB PROCEDURE  2/28/08    HEMICOLECTOMY  10/02    HYSTERECTOMY (CERVIX STATUS UNKNOWN)      KNEE SURGERY  1973    WI COLON CA SCRN NOT HI RSK IND N/A 7/10/2017    COLONOSCOPY performed by Bryson  8.0  8.0    Protein, UA      NEGATIVE mg/dL 2+ !    Urobilinogen      0.0 - 1.0 EU/dL Normal    Nitrite, Urine      NEGATIVE  NEGATIVE    Leukocyte Esterase, Urine      NEGATIVE  3+ !    COMMENT, [id number] --    Sodium      135 - 144 mmol/L 141    Potassium      3.7 - 5.3 mmol/L 5.1    Chloride      98 - 107 mmol/L 105    CARBON DIOXIDE      20 - 31 mmol/L 24    Anion Gap      9 - 17 mmol/L 12    Glucose      70 - 99 mg/dL 128 (H)    BUN,BUNPL      8 - 23 mg/dL 28 (H)    Creatinine      0.5 - 0.9 mg/dL 1.8 (H)    Est, Glom Filt Rate      >60 mL/min/1.73m2 26 (L)    Bun/Cre      9 - 20  16    Calcium      8.6 - 10.4 mg/dL 10.3    WBC      3.5 - 11.0 k/uL 8.3    RBC      4.00 - 5.20 m/uL 4.07    Hemoglobin Quant      12.0 - 16.0 g/dL 11.7 (L)    Hematocrit      36.0 - 46.0 % 35.5 (L)    MCV      80.0 - 100.0 fL 87.2    MCH      26.0 - 34.0 pg 28.7    MCHC      31.0 - 37.0 g/dL 33.0    RDW      12.1 - 15.2 % 13.5    Platelet Count      140 - 450 k/uL 229    MPV      6.0 - 12.0 fL 9.5    WBC, UA      0 /HPF 50     RBC, UA      0 - 2 /HPF 20 TO 50    Crystals, UA      None /HPF 2+ TRIPLE PHOSPHATE !    Epithelial Cells, UA      /HPF 2 TO 5    Bacteria, UA      None  4+ !    - --       Legend:  ! Abnormal  (H) High  (L) Low    Ua infected   Wbc normal      Given rocephin for uti.   All other labs were within normal range or not returned as of this dictation.      MIPS    Pt with covid and uti started on rocephin.  Had some vomiting in the ed. Accepted by back for admission to hospital service.        EMERGENCY DEPARTMENT COURSE and DIFFERENTIAL DIAGNOSIS/MDM:         1)  Number and Complexity of Problems  Problem List This Visit:  confusion weakness covid    Differential Diagnosis: sepsis, uti, covid    Diagnoses Considered but Do Not Suspect:  acs    Pertinent Comorbid Conditions:  age    2)  Data Reviewed  My EKG interpretation:       Decision Rules/Scores utilized:      Tests considered but not ordered and why:

## 2024-05-16 NOTE — PROGRESS NOTES
Patient arrives to room 257 via cart. A&O x4 and able to stand and pivot into bed unassisted. Daughter-in-law April at bedside to assist with admission process. Patient is able to answer all questions on her own but often needs clarification from April. Patient denies any nausea at this time and states she has not vomited at all today.     Treatment plan reviewed and all questions and concerns addressed at this time. Fruit and cheese plate provided for snack before dinner. Bed alarm placed on.

## 2024-05-17 LAB
ANION GAP SERPL CALCULATED.3IONS-SCNC: 11 MMOL/L (ref 9–17)
BASOPHILS # BLD: 0.02 K/UL (ref 0–0.2)
BASOPHILS NFR BLD: 0 % (ref 0–2)
BUN SERPL-MCNC: 26 MG/DL (ref 8–23)
BUN/CREAT SERPL: 16 (ref 9–20)
CALCIUM SERPL-MCNC: 8.7 MG/DL (ref 8.6–10.4)
CHLORIDE SERPL-SCNC: 106 MMOL/L (ref 98–107)
CO2 SERPL-SCNC: 20 MMOL/L (ref 20–31)
CREAT SERPL-MCNC: 1.6 MG/DL (ref 0.5–0.9)
EOSINOPHIL # BLD: 0.11 K/UL (ref 0–0.4)
EOSINOPHILS RELATIVE PERCENT: 2 % (ref 0–5)
ERYTHROCYTE [DISTWIDTH] IN BLOOD BY AUTOMATED COUNT: 13.9 % (ref 12.1–15.2)
FERRITIN SERPL-MCNC: 18 NG/ML (ref 13–150)
FOLATE SERPL-MCNC: 6.6 NG/ML (ref 4.8–24.2)
GFR, ESTIMATED: 30 ML/MIN/1.73M2
GLUCOSE BLD-MCNC: 106 MG/DL (ref 65–99)
GLUCOSE BLD-MCNC: 91 MG/DL (ref 65–99)
GLUCOSE BLD-MCNC: 94 MG/DL (ref 65–99)
GLUCOSE BLD-MCNC: 97 MG/DL (ref 65–99)
GLUCOSE SERPL-MCNC: 99 MG/DL (ref 70–99)
HCT VFR BLD AUTO: 27.9 % (ref 36–46)
HGB BLD-MCNC: 9.2 G/DL (ref 12–16)
IMM GRANULOCYTES # BLD AUTO: 0.01 K/UL (ref 0–0.3)
IMM GRANULOCYTES NFR BLD: 0 % (ref 0–5)
IRON SATN MFR SERPL: 19 % (ref 20–55)
IRON SERPL-MCNC: 45 UG/DL (ref 37–145)
LYMPHOCYTES NFR BLD: 1.57 K/UL (ref 1–4.8)
LYMPHOCYTES RELATIVE PERCENT: 32 % (ref 15–40)
MCH RBC QN AUTO: 29.3 PG (ref 26–34)
MCHC RBC AUTO-ENTMCNC: 33 G/DL (ref 31–37)
MCV RBC AUTO: 88.9 FL (ref 80–100)
MICROORGANISM SPEC CULT: NORMAL
MONOCYTES NFR BLD: 0.3 K/UL (ref 0–1)
MONOCYTES NFR BLD: 6 % (ref 4–8)
NEUTROPHILS NFR BLD: 60 % (ref 47–75)
NEUTS SEG NFR BLD: 2.95 K/UL (ref 2.5–7)
PLATELET # BLD AUTO: 177 K/UL (ref 140–450)
PMV BLD AUTO: 10.1 FL (ref 6–12)
POTASSIUM SERPL-SCNC: 3.9 MMOL/L (ref 3.7–5.3)
RBC # BLD AUTO: 3.14 M/UL (ref 4–5.2)
SODIUM SERPL-SCNC: 137 MMOL/L (ref 135–144)
SPECIMEN DESCRIPTION: NORMAL
TIBC SERPL-MCNC: 236 UG/DL (ref 250–450)
UNSATURATED IRON BINDING CAPACITY: 191 UG/DL (ref 112–347)
VIT B12 SERPL-MCNC: 298 PG/ML (ref 232–1245)
WBC OTHER # BLD: 5 K/UL (ref 3.5–11)

## 2024-05-17 PROCEDURE — 2580000003 HC RX 258: Performed by: INTERNAL MEDICINE

## 2024-05-17 PROCEDURE — 6360000002 HC RX W HCPCS: Performed by: INTERNAL MEDICINE

## 2024-05-17 PROCEDURE — 83540 ASSAY OF IRON: CPT

## 2024-05-17 PROCEDURE — 83550 IRON BINDING TEST: CPT

## 2024-05-17 PROCEDURE — 97165 OT EVAL LOW COMPLEX 30 MIN: CPT

## 2024-05-17 PROCEDURE — 82746 ASSAY OF FOLIC ACID SERUM: CPT

## 2024-05-17 PROCEDURE — 82607 VITAMIN B-12: CPT

## 2024-05-17 PROCEDURE — 82728 ASSAY OF FERRITIN: CPT

## 2024-05-17 PROCEDURE — 80048 BASIC METABOLIC PNL TOTAL CA: CPT

## 2024-05-17 PROCEDURE — 94761 N-INVAS EAR/PLS OXIMETRY MLT: CPT

## 2024-05-17 PROCEDURE — 6370000000 HC RX 637 (ALT 250 FOR IP): Performed by: INTERNAL MEDICINE

## 2024-05-17 PROCEDURE — 36415 COLL VENOUS BLD VENIPUNCTURE: CPT

## 2024-05-17 PROCEDURE — 85025 COMPLETE CBC W/AUTO DIFF WBC: CPT

## 2024-05-17 PROCEDURE — 1200000000 HC SEMI PRIVATE

## 2024-05-17 PROCEDURE — 82947 ASSAY GLUCOSE BLOOD QUANT: CPT

## 2024-05-17 PROCEDURE — 97161 PT EVAL LOW COMPLEX 20 MIN: CPT

## 2024-05-17 RX ORDER — CHOLECALCIFEROL (VITAMIN D3) 125 MCG
5 CAPSULE ORAL NIGHTLY PRN
Status: DISCONTINUED | OUTPATIENT
Start: 2024-05-17 | End: 2024-05-18 | Stop reason: HOSPADM

## 2024-05-17 RX ADMIN — SODIUM CHLORIDE, PRESERVATIVE FREE 10 ML: 5 INJECTION INTRAVENOUS at 08:38

## 2024-05-17 RX ADMIN — CEFTRIAXONE SODIUM 1000 MG: 1 INJECTION, POWDER, FOR SOLUTION INTRAMUSCULAR; INTRAVENOUS at 15:18

## 2024-05-17 RX ADMIN — ASPIRIN 81 MG: 81 TABLET, COATED ORAL at 08:38

## 2024-05-17 RX ADMIN — SODIUM CHLORIDE, PRESERVATIVE FREE 10 ML: 5 INJECTION INTRAVENOUS at 20:14

## 2024-05-17 RX ADMIN — CHOLECALCIFEROL TAB 25 MCG (1000 UNIT) 2000 UNITS: 25 TAB at 08:38

## 2024-05-17 RX ADMIN — ASPIRIN 81 MG: 81 TABLET, COATED ORAL at 20:13

## 2024-05-17 RX ADMIN — ATORVASTATIN CALCIUM 10 MG: 10 TABLET, FILM COATED ORAL at 20:13

## 2024-05-17 RX ADMIN — SODIUM CHLORIDE: 9 INJECTION, SOLUTION INTRAVENOUS at 00:57

## 2024-05-17 RX ADMIN — PAROXETINE HYDROCHLORIDE 20 MG: 20 TABLET, FILM COATED ORAL at 08:40

## 2024-05-17 RX ADMIN — PANTOPRAZOLE SODIUM 40 MG: 40 TABLET, DELAYED RELEASE ORAL at 05:04

## 2024-05-17 NOTE — PROGRESS NOTES
Lisinopril held; order parameters not met.      05/17/24 0836   Vitals   Temp 97.3 °F (36.3 °C)   Temp Source Oral   Pulse 77   Heart Rate Source Brachial   Respirations 18   BP (!) 115/55   MAP (Calculated) 75   BP Location Right upper arm   BP Method Automatic   Patient Position Semi fowlers   SpO2 92 %   O2 Device None (Room air)

## 2024-05-17 NOTE — PLAN OF CARE
[facility]  Inpatient Physical Therapy  Plan of Care  Date: 2024  Patient Name: Tawanna Antonio        : 10/20/1931  (89 y.o.)  Referring Practitioner: Dr. Ardon  Admission Date: 2024  Referral Date : 24  Diagnosis: UTI  Treatment Diagnosis: Weakness  PT Orders Received and Evaluation Complete  Identified Problem Areas:  Therapy Prognosis: Good    Justification for Skilled Services:  [] Reduce Falls   [] Improve Ambulation  []  Complete Daily Tasks Safely   [] Improve Balance   [] Improve LE strength  []  Return to Prior Level of Function  [] Improve Functional Mobility   []  Family/Caregiver Education   Patient Education: []Assistive Devices []Home Exercise Program and Progression    Plan  Frequency: 1x evaluation       [x] Evaluation:  [] Therapeutic Exercise   [] Gait Training  [] Therapeutic Activity    [] Home Safety Evaluation         [] Massage                        [] Neuromuscular Re-education [] Back Education             [] Patient Education [] Home Exercise Program  Discharge Recommendations: Home independently  No PT required at present based on independent status with transfers and amb    Rehab Potential:  [x]  Good [] Fair   []  Poor    Goals  Short Term Goals  Time Frame for Short Term Goals: 1 visit  Short Term Goal 1: Assess functional mobility for safety to return home - MET         Upon Swingbed Transfer this Plan of Care will be followed until revision is complete.    NISHA GONZALEZ, PT,    Date: 2024

## 2024-05-17 NOTE — PROGRESS NOTES
[facility]  Occupational Therapy    Date: 2024  Patient Name: Tawanna Antonio        : 10/20/1931       [] Pt Refusal           [x] Pt Unavailable due to: talking with Sister Thais. Will re-attempt OT evaluation later today as able.        Jenn Hall, OTR/L Date: 2024

## 2024-05-17 NOTE — PLAN OF CARE
[facility] [place]  Phone: [phone]    Inpatient Occupational Therapy Plan of Care  OT Orders Received and Evaluation Complete    Date: 2024  Patient Name: Tawanna Antonio        MRN: [id number]    : 10/20/1931  (89 y.o.)  Referring Practitioner: Dr. Ardon  Diagnosis: UTI  Treatment Diagnosis: UTI    Identified Problem Areas:     N/A     Justification for Skilled Services:     N/A    Treatment Plan:     Times Per Day: Once a day (evaluation only)    Goals:     N/A         Upon swingbed transfer, this plan of care will be followed until revision is completed.    Jenn Hall, OTR/L                      Date: 2024

## 2024-05-17 NOTE — PLAN OF CARE
Problem: Discharge Planning  Goal: Discharge to home or other facility with appropriate resources  5/17/2024 0926 by Sandra Short RN  Outcome: Progressing  5/17/2024 0406 by Sindi Parry RN  Outcome: Progressing  Flowsheets (Taken 5/16/2024 1930)  Discharge to home or other facility with appropriate resources: Identify barriers to discharge with patient and caregiver     Problem: Skin/Tissue Integrity  Goal: Absence of new skin breakdown  Description: 1.  Monitor for areas of redness and/or skin breakdown  2.  Assess vascular access sites hourly  3.  Every 4-6 hours minimum:  Change oxygen saturation probe site  4.  Every 4-6 hours:  If on nasal continuous positive airway pressure, respiratory therapy assess nares and determine need for appliance change or resting period.  5/17/2024 0926 by Sandra Short RN  Outcome: Progressing  5/17/2024 0406 by Sindi Parry RN  Outcome: Progressing     Problem: ABCDS Injury Assessment  Goal: Absence of physical injury  5/17/2024 0926 by Sandra Short RN  Outcome: Progressing  5/17/2024 0406 by Sindi Parry RN  Outcome: Progressing     Problem: Safety - Adult  Goal: Free from fall injury  5/17/2024 0406 by Sindi Parry RN  Outcome: Progressing     Problem: Cardiovascular - Adult  Goal: Maintains optimal cardiac output and hemodynamic stability  5/17/2024 0406 by Sindi Parry RN  Outcome: Progressing  Goal: Absence of cardiac dysrhythmias or at baseline  5/17/2024 0406 by Sindi Parry RN  Outcome: Progressing     Problem: Skin/Tissue Integrity - Adult  Goal: Skin integrity remains intact  5/17/2024 0406 by Sindi Parry RN  Outcome: Progressing  Flowsheets (Taken 5/16/2024 1930)  Skin Integrity Remains Intact: Monitor for areas of redness and/or skin breakdown     Problem: Musculoskeletal - Adult  Goal: Return mobility to safest level of function  5/17/2024 0406 by Sindi Parry RN  Outcome: Progressing  Goal: Return ADL status to a safe level of

## 2024-05-17 NOTE — PROGRESS NOTES
RN case manager and SW met with pt and son to complete assessment. Pt is alert and oriented and pleasant with assessment. Pt is a 89 year old female admitted for UTI. Pt lives alone in her home in [place]. Pt has grab bars in her bathroom at home but uses no other DME. Pt was not using any community services prior to admission. Pt drives and family will assist as needed as well.     Pt is a full code and follows with Dr Ardon as PCP. Pt reports that she has advance directives but they are not on her medical record. Pt reports that her son Deandre is her primary decision maker. ACP note completed with pt and son. Pt states that her medications are affordable with insurance.     Pt plans to return home at discharge. Pt and son identify no discharge needs or concerns. SW will follow and remain available as needed. Syeda SEGOVIAW 5/17/2024

## 2024-05-17 NOTE — ACP (ADVANCE CARE PLANNING)
Advance Care Planning     Advance Care Planning Activator (Inpatient)  Conversation Note      Date of ACP Conversation: 5/17/2024     Conversation Conducted with: Patient with Decision Making Capacity    ACP Activator: DARION Frazier        Health Care Decision Maker:     Current Designated Health Care Decision Maker:     Primary Decision Maker: Deandre Costello - Child - [phone]    Secondary Decision Maker: Hayes Antonio Child - [phone]  Click here to complete Healthcare Decision Makers including section of the Healthcare Decision Maker Relationship (ie \"Primary\")  Today we documented Decision Maker(s) consistent with ACP documents on file. Pt / family to provide documents.    Care Preferences    Ventilation:  \"If you were in your present state of health and suddenly became very ill and were unable to breathe on your own, what would your preference be about the use of a ventilator (breathing machine) if it were available to you?\"      Would the patient desire the use of ventilator (breathing machine)?: yes    \"If your health worsens and it becomes clear that your chance of recovery is unlikely, what would your preference be about the use of a ventilator (breathing machine) if it were available to you?\"     Would the patient desire the use of ventilator (breathing machine)?: Yes \"if the Dr felt it was appropriate\"      Resuscitation  \"CPR works best to restart the heart when there is a sudden event, like a heart attack, in someone who is otherwise healthy. Unfortunately, CPR does not typically restart the heart for people who have serious health conditions or who are very sick.\"    \"In the event your heart stopped as a result of an underlying serious health condition, would you want attempts to be made to restart your heart (answer \"yes\" for attempt to resuscitate) or would you prefer a natural death (answer \"no\" for do not attempt to resuscitate)?\" yes       [] Yes   [x] No   Educated Patient /

## 2024-05-17 NOTE — H&P
Admission:  I obtained, documented, reviewed, and updated the patient's current medications.    Prior to Admission medications    Medication Sig Start Date End Date Taking? Authorizing Provider   lovastatin (MEVACOR) 40 MG tablet TAKE 1 TABLET BY MOUTH NIGHTLY 3/19/24   Bryson Ardon MD   lansoprazole (PREVACID) 30 MG delayed release capsule TAKE 1 CAPSULE BY MOUTH DAILY 1/8/24   Bryson Ardon MD   PARoxetine (PAXIL) 20 MG tablet TAKE 1 TABLET BY MOUTH EVERY DAY IN THE MORNING 1/8/24   Bryson Ardon MD   triamterene-hydroCHLOROthiazide (DYAZIDE) 37.5-25 MG per capsule TAKE 1 CAPSULE BY MOUTH EVERY DAY 12/7/23   Bryson Ardon MD   lisinopril (PRINIVIL;ZESTRIL) 10 MG tablet TAKE 1 TABLET BY MOUTH DAILY 12/7/23   Bryson Ardon MD   linagliptin (TRADJENTA) 5 MG tablet Take 1 tablet by mouth daily 3/7/22   Bryson Ardon MD   FIBER PO Take 1 tablet by mouth daily    ProviderJesus Alberto MD   aspirin 81 MG tablet Take 1 tablet by mouth 2 times daily    Jesus Alberto Brarera MD   Cholecalciferol (VITAMIN D) 2000 UNITS CAPS capsule Take 1 capsule by mouth daily. 4/16/12   Bryson Ardon MD       Allergies:  Patient has no known allergies.    Social History:   TOBACCO:   reports that she quit smoking about 46 years ago. Her smoking use included cigarettes. She has never used smokeless tobacco.  ETOH:   reports no history of alcohol use.  OCCUPATION:      Family History:       Problem Relation Age of Onset    Heart Attack Brother        REVIEW OF SYSTEMS:  Constitutional:  negative for  fevers and chills.  Positive for Malaise  HEENT:  negative for  ear drainage, earaches, nasal congestion, sore throat, and positive for hearing loss.  Neck:  No lumps or masses  Cardiac:  negative for  chest pain, dyspnea, palpitations  Respiratory:  negative for  dry cough, cough with sputum, dyspnea, and wheezing  Gastrointestinal:  positive for nausea and vomiting  negative for change in bowel habits and abdominal pain  :  negative for frequency,  surrogate decision maker documented within the patient's medical record. (Does not satisfy MIPS performance).            Bryson Ardon MD, MD  Admitting Hospitalist

## 2024-05-17 NOTE — THERAPY EVALUATION
[facility]  Phone: [phone]    Occupational Therapy Evaluation    Date: 2024  Patient Name: Tawanna Antonio        MRN: [id number]    : 10/20/1931  (89 y.o.)  Gender: female   Referring Practitioner: Dr. Ardon  Diagnosis: UTI  Additional Pertinent Hx: Admitted to [facility] on 24 due to UTI. Referred to OT services to assess ability to care for self.    Past Medical History:   Diagnosis Date    Cancer (HCC)     colon    Colon cancer (HCC) 10/02    Diabetes mellitus (HCC)     Esophageal reflux     Heart disease     Hypercholesterolemia     Hypertension     Microalbuminuria     Osteoarthrosis     Osteoporosis      Past Surgical History:   Procedure Laterality Date    COLONOSCOPY  , 12    COLONOSCOPY  07/10/2017    Dr. Ardon:  3 adenomatous polyps.    DIAGNOSTIC CARDIAC CATH LAB PROCEDURE  08    HEMICOLECTOMY  10/02    HYSTERECTOMY (CERVIX STATUS UNKNOWN)      KNEE SURGERY      IA COLON CA SCRN NOT HI RSK IND N/A 7/10/2017    COLONOSCOPY performed by Bryson Ardon MD at [facility] OR     Subjective:     Subjective: Patient was sitting in recliner upon OT arrival. Was agreeable to OT evaluation.    Objective:     Social/Functional History:  Lives With: Alone  Type of Home: [employer] ([place])  Home Layout: One level  Home Access: Level entry  Bathroom Shower/Tub: Tub/Shower unit, Walk-in shower  Bathroom Toilet: Standard  Bathroom Equipment: Grab bars in shower, Grab bars around toilet, Shower chair, Hand-held shower  Home Equipment: Grab bars, Walker - 4-Wheeled, Cane - Quad    PLOF:  Receives Help From: Family (per patient, son and DIL check in 2-3x/day)  ADL Assistance: Independent  Homemaking Assistance: Independent  Ambulation Assistance: Independent (no AD)  Transfer Assistance: Independent    ADLs:  Feeding: Setup  Grooming: Supervision (in standing)  UE Bathing: Setup (in sitting)  LE Bathing: Supervision (sitting/standing PRN)  UE Dressing: Setup (in sitting)  LE  Dressing: Supervision (sitting/standing PRN)  Toileting: Supervision    Transfers:  Sit to stand: Modified independent   Stand to sit: Modified independent    Assessment:     Assessment: Patient was sitting in recliner upon OT arrival. Was agreeable to OT evaluation. Completed ADLs and functional transfers as documented above (based on clinical reasoning and observation). BUE AROM is WFL. Patient simulated donning/doffing bilateral footwear via figure 4 technique and performed functional mobility without an + feet without LOB. In addition, patient denied any pain/discomfort throughout evaluation. Patient does not require additional OT services during hospitalization and is safe to return home once medically stable. Patient verbalized understanding/agreement re: POC and denied any further questions/concerns. Patient remains in recliner with call light/personal items within reach and RN present (for blood sugar check) upon OT departure. Provided update to RN.    Goals:     N/A    Plan:     Times Per Day: Once a day (evaluation only)         Time In: 1131  Time Out: 1149  Timed Coded Minutes: 0  Total Treatment Time: 18    SERAFIN Nichols/KAREN      Date: 5/17/2024

## 2024-05-17 NOTE — PLAN OF CARE
Problem: Discharge Planning  Goal: Discharge to home or other facility with appropriate resources  Outcome: Progressing  Flowsheets (Taken 5/16/2024 1930)  Discharge to home or other facility with appropriate resources: Identify barriers to discharge with patient and caregiver     Problem: Skin/Tissue Integrity  Goal: Absence of new skin breakdown  Description: 1.  Monitor for areas of redness and/or skin breakdown  2.  Assess vascular access sites hourly  3.  Every 4-6 hours minimum:  Change oxygen saturation probe site  4.  Every 4-6 hours:  If on nasal continuous positive airway pressure, respiratory therapy assess nares and determine need for appliance change or resting period.  Outcome: Progressing     Problem: ABCDS Injury Assessment  Goal: Absence of physical injury  Outcome: Progressing     Problem: Safety - Adult  Goal: Free from fall injury  Outcome: Progressing     Problem: Cardiovascular - Adult  Goal: Maintains optimal cardiac output and hemodynamic stability  Outcome: Progressing  Goal: Absence of cardiac dysrhythmias or at baseline  Outcome: Progressing     Problem: Skin/Tissue Integrity - Adult  Goal: Skin integrity remains intact  Outcome: Progressing  Flowsheets (Taken 5/16/2024 1930)  Skin Integrity Remains Intact: Monitor for areas of redness and/or skin breakdown     Problem: Musculoskeletal - Adult  Goal: Return mobility to safest level of function  Outcome: Progressing  Goal: Return ADL status to a safe level of function  Outcome: Progressing     Problem: Infection - Adult  Goal: Absence of infection at discharge  Outcome: Progressing     Problem: Metabolic/Fluid and Electrolytes - Adult  Goal: Electrolytes maintained within normal limits  Outcome: Progressing

## 2024-05-17 NOTE — CARE COORDINATION
Case Management Assessment  Initial Evaluation    Date/Time of Evaluation: 5/17/2024 11:15 AM  Assessment Completed by: Nirmal Nieves RN    If patient is discharged prior to next notation, then this note serves as note for discharge by case management.    Patient Name: Tawanna Antonio                   YOB: 1931  Diagnosis: UTI (urinary tract infection) [N39.0]  Acute cystitis without hematuria [N30.00]  COVID [U07.1]                   Date / Time: 5/16/2024 11:42 AM    Patient Admission Status: Inpatient   Readmission Risk (Low < 19, Mod (19-27), High > 27): Readmission Risk Score: 12.1    Current PCP: Bryson Ardon MD  PCP verified by CM? (P) Yes (Dr.Back)    Chart Reviewed: Yes      History Provided by: (P) Patient  Patient Orientation: (P) Alert and Oriented, Person, Place, Situation, Self    Patient Cognition: (P) Alert    Hospitalization in the last 30 days (Readmission):  No    If yes, Readmission Assessment in CM Navigator will be completed.    Advance Directives:      Code Status: Full Code   Patient's Primary Decision Maker is: (P) Named in Scanned ACP Document (encouraged pt to bring copy in)    Primary Decision Maker: Deandre Costello - Child - [phone]    Secondary Decision Maker: PacoHayes - [place] Child - [phone]    Discharge Planning:    Patient lives with: (P) Alone Type of Home: (P) House  Primary Care Giver: (P) Self  Patient Support Systems include: (P) Children, Family Members   Current Financial resources: (P) Medicare  Current community resources: (P) None  Current services prior to admission: (P) None            Current DME:              Type of Home Care services:  (P) None    ADLS  Prior functional level: (P) Independent in ADLs/IADLs  Current functional level: (P) Independent in ADLs/IADLs    PT AM-PAC:   /24  OT AM-PAC:   /24    Family can provide assistance at DC: (P) Yes  Would you like Case Management to discuss the discharge plan with any other family

## 2024-05-17 NOTE — THERAPY EVALUATION
[facility]  Physical Therapy  Evaluation  Date: 2024  Patient Name: Tawanna Antonio        MRN: [id number]    : 10/20/1931  (89 y.o.)  Gender: female   Referring Practitioner: Dr. Ardon  Diagnosis: UTI  Additional Pertinent Hx: Patient admitted with generalized weakness and found to have UTI.  Referred to PT to assess functional mobility and assist with discharge planning   Past Medical History:   Diagnosis Date    Cancer (HCC)     colon    Colon cancer (HCC) 10/02    Diabetes mellitus (HCC)     Esophageal reflux     Heart disease     Hypercholesterolemia     Hypertension     Microalbuminuria     Osteoarthrosis     Osteoporosis      Past Surgical History:   Procedure Laterality Date    COLONOSCOPY  , 12    COLONOSCOPY  07/10/2017    Dr. Ardon:  3 adenomatous polyps.    DIAGNOSTIC CARDIAC CATH LAB PROCEDURE  08    HEMICOLECTOMY  10/02    HYSTERECTOMY (CERVIX STATUS UNKNOWN)      KNEE SURGERY      WI COLON CA SCRN NOT HI RSK IND N/A 7/10/2017    COLONOSCOPY performed by Bryson Ardon MD at [facility] OR       Restrictions         Subjective              Orientation  Overall Orientation Status: Within Functional Limits    Home Living / Prior Level of Function  Social/Functional History  Lives With: Alone  Type of Home: [employer] (([place]  Home Layout: One level  Home Access: Level entry  Bathroom Shower/Tub: Tub/Shower unit, Walk-in shower  Bathroom Toilet: Standard  Bathroom Equipment: Grab bars in shower, Grab bars around toilet, Shower chair, Hand-held shower  Home Equipment: Grab bars, Walker - 4-Wheeled, Cane - Quad  Receives Help From: Family (per patient, son and DIL check in 2-3x/day)  ADL Assistance: Independent  Homemaking Assistance: Independent  Ambulation Assistance: Independent (no AD)  Transfer Assistance: Independent  Active : Yes  Mode of Transportation: Car  Occupation: Retired  Hearing  Hearing: Exceptions to WFL  Hearing Exceptions: Hard of hearing/hearing

## 2024-05-18 VITALS
OXYGEN SATURATION: 95 % | WEIGHT: 166.7 LBS | TEMPERATURE: 98.5 F | SYSTOLIC BLOOD PRESSURE: 118 MMHG | HEIGHT: 63 IN | DIASTOLIC BLOOD PRESSURE: 72 MMHG | HEART RATE: 75 BPM | BODY MASS INDEX: 29.54 KG/M2 | RESPIRATION RATE: 18 BRPM

## 2024-05-18 LAB
ALBUMIN SERPL-MCNC: 3.3 G/DL (ref 3.5–5.2)
ALP SERPL-CCNC: 97 U/L (ref 35–104)
ALT SERPL-CCNC: 6 U/L (ref 5–33)
ANION GAP SERPL CALCULATED.3IONS-SCNC: 13 MMOL/L (ref 9–17)
AST SERPL-CCNC: 9 U/L
BASOPHILS # BLD: 0.03 K/UL (ref 0–0.2)
BASOPHILS NFR BLD: 1 % (ref 0–2)
BILIRUB SERPL-MCNC: 0.1 MG/DL (ref 0.3–1.2)
BUN SERPL-MCNC: 26 MG/DL (ref 8–23)
BUN/CREAT SERPL: 14 (ref 9–20)
CALCIUM SERPL-MCNC: 9 MG/DL (ref 8.6–10.4)
CHLORIDE SERPL-SCNC: 105 MMOL/L (ref 98–107)
CO2 SERPL-SCNC: 19 MMOL/L (ref 20–31)
CREAT SERPL-MCNC: 1.8 MG/DL (ref 0.5–0.9)
EOSINOPHIL # BLD: 0.14 K/UL (ref 0–0.4)
EOSINOPHILS RELATIVE PERCENT: 2 % (ref 0–5)
ERYTHROCYTE [DISTWIDTH] IN BLOOD BY AUTOMATED COUNT: 13.7 % (ref 12.1–15.2)
GFR, ESTIMATED: 26 ML/MIN/1.73M2
GLUCOSE BLD-MCNC: 92 MG/DL (ref 65–99)
GLUCOSE BLD-MCNC: 94 MG/DL (ref 65–99)
GLUCOSE SERPL-MCNC: 105 MG/DL (ref 70–99)
HCT VFR BLD AUTO: 29.4 % (ref 36–46)
HGB BLD-MCNC: 9.8 G/DL (ref 12–16)
IMM GRANULOCYTES # BLD AUTO: 0 K/UL (ref 0–0.3)
IMM GRANULOCYTES NFR BLD: 0 % (ref 0–5)
LYMPHOCYTES NFR BLD: 1.8 K/UL (ref 1–4.8)
LYMPHOCYTES RELATIVE PERCENT: 30 % (ref 15–40)
MCH RBC QN AUTO: 29.6 PG (ref 26–34)
MCHC RBC AUTO-ENTMCNC: 33.3 G/DL (ref 31–37)
MCV RBC AUTO: 88.8 FL (ref 80–100)
MONOCYTES NFR BLD: 0.33 K/UL (ref 0–1)
MONOCYTES NFR BLD: 6 % (ref 4–8)
NEUTROPHILS NFR BLD: 61 % (ref 47–75)
NEUTS SEG NFR BLD: 3.7 K/UL (ref 2.5–7)
PLATELET # BLD AUTO: 185 K/UL (ref 140–450)
PMV BLD AUTO: 10 FL (ref 6–12)
POTASSIUM SERPL-SCNC: 4 MMOL/L (ref 3.7–5.3)
PROT SERPL-MCNC: 5.7 G/DL (ref 6.4–8.3)
RBC # BLD AUTO: 3.31 M/UL (ref 4–5.2)
SODIUM SERPL-SCNC: 137 MMOL/L (ref 135–144)
WBC OTHER # BLD: 6 K/UL (ref 3.5–11)

## 2024-05-18 PROCEDURE — 85025 COMPLETE CBC W/AUTO DIFF WBC: CPT

## 2024-05-18 PROCEDURE — 6370000000 HC RX 637 (ALT 250 FOR IP): Performed by: INTERNAL MEDICINE

## 2024-05-18 PROCEDURE — 36415 COLL VENOUS BLD VENIPUNCTURE: CPT

## 2024-05-18 PROCEDURE — 2580000003 HC RX 258: Performed by: INTERNAL MEDICINE

## 2024-05-18 PROCEDURE — 82947 ASSAY GLUCOSE BLOOD QUANT: CPT

## 2024-05-18 PROCEDURE — 80053 COMPREHEN METABOLIC PANEL: CPT

## 2024-05-18 PROCEDURE — 94761 N-INVAS EAR/PLS OXIMETRY MLT: CPT

## 2024-05-18 RX ORDER — CEPHALEXIN 500 MG/1
500 CAPSULE ORAL 2 TIMES DAILY
Qty: 14 CAPSULE | Refills: 0 | Status: SHIPPED | OUTPATIENT
Start: 2024-05-18 | End: 2024-05-25

## 2024-05-18 RX ADMIN — ASPIRIN 81 MG: 81 TABLET, COATED ORAL at 08:30

## 2024-05-18 RX ADMIN — CHOLECALCIFEROL TAB 25 MCG (1000 UNIT) 2000 UNITS: 25 TAB at 08:30

## 2024-05-18 RX ADMIN — LISINOPRIL 10 MG: 10 TABLET ORAL at 08:30

## 2024-05-18 RX ADMIN — PANTOPRAZOLE SODIUM 40 MG: 40 TABLET, DELAYED RELEASE ORAL at 05:00

## 2024-05-18 RX ADMIN — SODIUM CHLORIDE, PRESERVATIVE FREE 10 ML: 5 INJECTION INTRAVENOUS at 08:32

## 2024-05-18 RX ADMIN — PAROXETINE HYDROCHLORIDE 20 MG: 20 TABLET, FILM COATED ORAL at 08:30

## 2024-05-18 NOTE — PROGRESS NOTES
IV and telemetry monitor removed without incident. Discharge instructions reviewed with and provided to patient at bedside. Verbalized understanding of follow up appointment, diet, activity, medications and reasons to return to ED/call physician. Advised to call back directly if there are further questions, or if these symptoms fail to improve as anticipated or worsen. All questions answered. Personal belongings sent with patient. Assisted to wheelchair and taken to personal car. Denies further needs.        05/18/24 1312   Discharge Event   Discharged with Documented Belongings Yes   Departure Mode Family member;In private custody   Mobility at Departure Wheelchair   Discharged to Private Residence   Time of Discharge 1313        05/18/24 1314   Belongings   Dental Appliances None   Vision - Corrective Lenses Eyeglasses;Sent home   Hearing Aid Left hearing aid;Sent home   Clothing Footwear;Shirt;Shorts;Pants;Socks;Undergarments;Sent home   Jewelry None   Electronic Devices Camera;;Sent home   Weapons (Notify Protective Services/Security) None   Home Medications None   Valuables Given To Patient   Provide Name(s) of Who Valuable(s) Were Given To Patient

## 2024-05-18 NOTE — PLAN OF CARE
Problem: Discharge Planning  Goal: Discharge to home or other facility with appropriate resources  Outcome: Progressing  Flowsheets (Taken 5/17/2024 2000)  Discharge to home or other facility with appropriate resources: Identify barriers to discharge with patient and caregiver     Problem: Skin/Tissue Integrity  Goal: Absence of new skin breakdown  Description: 1.  Monitor for areas of redness and/or skin breakdown  2.  Assess vascular access sites hourly  3.  Every 4-6 hours minimum:  Change oxygen saturation probe site  4.  Every 4-6 hours:  If on nasal continuous positive airway pressure, respiratory therapy assess nares and determine need for appliance change or resting period.  Outcome: Progressing     Problem: ABCDS Injury Assessment  Goal: Absence of physical injury  Outcome: Progressing     Problem: Safety - Adult  Goal: Free from fall injury  Outcome: Progressing     Problem: Cardiovascular - Adult  Goal: Maintains optimal cardiac output and hemodynamic stability  Outcome: Progressing  Flowsheets (Taken 5/17/2024 2000)  Maintains optimal cardiac output and hemodynamic stability: Monitor blood pressure and heart rate  Goal: Absence of cardiac dysrhythmias or at baseline  Outcome: Progressing  Flowsheets (Taken 5/17/2024 2000)  Absence of cardiac dysrhythmias or at baseline: Monitor cardiac rate and rhythm     Problem: Skin/Tissue Integrity - Adult  Goal: Skin integrity remains intact  Outcome: Progressing  Flowsheets (Taken 5/17/2024 2000)  Skin Integrity Remains Intact: Monitor for areas of redness and/or skin breakdown     Problem: Musculoskeletal - Adult  Goal: Return mobility to safest level of function  Outcome: Progressing  Flowsheets (Taken 5/17/2024 2000)  Return Mobility to Safest Level of Function: Assess patient stability and activity tolerance for standing, transferring and ambulating with or without assistive devices  Goal: Return ADL status to a safe level of function  Outcome:

## 2024-05-18 NOTE — PLAN OF CARE
Problem: Discharge Planning  Goal: Discharge to home or other facility with appropriate resources  5/18/2024 0844 by Sandra Short RN  Outcome: Adequate for Discharge  5/18/2024 0030 by Sindi Parry RN  Outcome: Progressing  Flowsheets (Taken 5/17/2024 2000)  Discharge to home or other facility with appropriate resources: Identify barriers to discharge with patient and caregiver     Problem: Skin/Tissue Integrity  Goal: Absence of new skin breakdown  Description: 1.  Monitor for areas of redness and/or skin breakdown  2.  Assess vascular access sites hourly  3.  Every 4-6 hours minimum:  Change oxygen saturation probe site  4.  Every 4-6 hours:  If on nasal continuous positive airway pressure, respiratory therapy assess nares and determine need for appliance change or resting period.  5/18/2024 0844 by Sandra Short RN  Outcome: Adequate for Discharge  5/18/2024 0030 by Sindi Parry RN  Outcome: Progressing     Problem: ABCDS Injury Assessment  Goal: Absence of physical injury  5/18/2024 0844 by Sandra Short RN  Outcome: Adequate for Discharge  5/18/2024 0030 by Sindi Parry RN  Outcome: Progressing     Problem: Safety - Adult  Goal: Free from fall injury  5/18/2024 0844 by Sandra Short RN  Outcome: Adequate for Discharge  5/18/2024 0030 by Sindi Parry RN  Outcome: Progressing     Problem: Cardiovascular - Adult  Goal: Maintains optimal cardiac output and hemodynamic stability  5/18/2024 0844 by Sandra Short RN  Outcome: Adequate for Discharge  5/18/2024 0030 by Sindi Parry RN  Outcome: Progressing  Flowsheets (Taken 5/17/2024 2000)  Maintains optimal cardiac output and hemodynamic stability: Monitor blood pressure and heart rate  Goal: Absence of cardiac dysrhythmias or at baseline  5/18/2024 0844 by Sandra Short RN  Outcome: Adequate for Discharge  5/18/2024 0030 by Sindi Parry RN  Outcome: Progressing  Flowsheets (Taken 5/17/2024 2000)  Absence of cardiac dysrhythmias

## 2024-05-18 NOTE — DISCHARGE SUMMARY
Hospitalist Discharge Summary    Tawanna Antonio  :  10/20/1931  MRN:  [id number]    Admit date:  2024  Discharge date:     Admitting Physician:  Bryson Ardon MD    Discharge Diagnoses:   UTI      Nausea / vomiting - resolved.      Mild dehydration - resolved.      CKD Stage III B.      Weakness - improved.      Anemia of chronic disease.      HTN      Hyperlipidemia      DM II      GERD      H/O Depression / anxiety      Vitamin D deficiency.      Admission Condition:  poor      Discharged Condition:  good    Hospital Course:     The patient is a 89 y.o. female who presents to the ER with a 2 day history of nausea.  According to Virginia, on Wednesday, she was having episodes of nausea and vomiting.  The nausea continued into Thursday but without the vomiting. She states she became a lot weaker.  Her daughter in law convinced her to come to the hospital after she did a home test which was positive for COVID.  Virginia denies any upper respiratory symptoms such as nasal congestion, drainage, ear ache or sore throat.  She denies having a cough or sputum production.  Appetite has been poor with the nausea.  Bowels have been moving and she denies any trouble urinating.  No fever or chills.      In the ER her BMP was normal other than a BUN at 28 and Creatinine at 1.8.  CBC showed a WBC at 8.3, Hgb 11.7, and Plt ct at 229.  UA showed 3 + LE with 50 to 100 WBC.  COVID was negative.  Urine culture was sent.    Virginia was started on IV Rocephin and this was continued at time of admission.  Her urine culture showed no growth at time of discharge.  She was admitted on IV hydration and PRN Zofran.  With hydration her nausea improved and she was eating well with a good appetite. After admission her Hgb dropped to 9.2 which was thought to be dilutional.  Her B12 was normal as well as her iron studies.  Stool heme occult was ordered but was not performed prior to discharge.  PT/OT was ordered for strengthening.   Her strength was back to baseline at discharge.  With her improvement it was felt she could be discharged home on oral Keflex with close out patient follow up in my office.         Discharge Exam:  Vitals: /66   Pulse 71   Temp 98 °F (36.7 °C) (Oral)   Resp 16   Ht 1.6 m (5' 3\")   Wt 75.6 kg (166 lb 11.2 oz)   SpO2 92%   BMI 29.53 kg/m²   General appearance: alert and cooperative with exam  HEENT: Head: Normocephalic, no lesions, without obvious abnormality.  Eye: Normal external eye, conjunctiva, lids cornea, JANKI.  Nose: Normal external nose, mucus membranes and septum.  Neck: no adenopathy, no carotid bruit, and supple, symmetrical, trachea midline  Lungs: clear to auscultation bilaterally  Heart: regular rate and rhythm, S1, S2 normal, and II/VI systolic murmur.  Abdomen: soft, non-tender; bowel sounds normal; no masses,  no organomegaly  Extremities: extremities normal, atraumatic, no cyanosis or edema, SCD's on.  Neurologic: Mental status: Alert, oriented, thought content appropriate    Discharge Medications:         Medication List        START taking these medications      cephALEXin 500 MG capsule  Commonly known as: KEFLEX  Take 1 capsule by mouth 2 times daily for 7 days            CONTINUE taking these medications      aspirin 81 MG tablet     FIBER PO     lansoprazole 30 MG delayed release capsule  Commonly known as: PREVACID  TAKE 1 CAPSULE BY MOUTH DAILY     linagliptin 5 MG tablet  Commonly known as: Tradjenta  Take 1 tablet by mouth daily     lisinopril 10 MG tablet  Commonly known as: PRINIVIL;ZESTRIL  TAKE 1 TABLET BY MOUTH DAILY     lovastatin 40 MG tablet  Commonly known as: MEVACOR  TAKE 1 TABLET BY MOUTH NIGHTLY     PARoxetine 20 MG tablet  Commonly known as: PAXIL  TAKE 1 TABLET BY MOUTH EVERY DAY IN THE MORNING     triamterene-hydroCHLOROthiazide 37.5-25 MG per capsule  Commonly known as: DYAZIDE  TAKE 1 CAPSULE BY MOUTH EVERY DAY     vitamin D 50 MCG (2000 UT) Caps

## 2024-05-18 NOTE — PROGRESS NOTES
Hospitalist Progress Note  5/18/2024 5:45 AM  Subjective:   Admit Date: 5/16/2024  PCP: Bryson Ardon MD    Interval History: Virginia has no complaints this am.  She is eating and drinking with no nausea.  Bowels are moving and she denies any trouble urinating.  No chest pain or SOB.  She feels her strength is back to baseline.    Diet: ADULT DIET; Regular; 4 carb choices (60 gm/meal)  Medications:   Scheduled Meds:   aspirin  81 mg Oral BID    Vitamin D  2,000 Units Oral Daily    pantoprazole  40 mg Oral QAM AC    lisinopril  10 mg Oral Daily    atorvastatin  10 mg Oral Nightly    PARoxetine  20 mg Oral QAM    sodium chloride flush  5-40 mL IntraVENous 2 times per day    cefTRIAXone (ROCEPHIN) IV  1,000 mg IntraVENous Q24H    insulin lispro  0-4 Units SubCUTAneous TID WC    insulin lispro  0-4 Units SubCUTAneous Nightly     Continuous Infusions:   sodium chloride      dextrose         Patient's current medications documented, reviewed, and updated.      CBC:   Recent Labs     05/16/24  1212 05/17/24  0407 05/18/24  0444   WBC 8.3 5.0 6.0   HGB 11.7* 9.2* 9.8*    177 185     BMP:    Recent Labs     05/16/24  1212 05/17/24  0407 05/18/24  0444    137 137   K 5.1 3.9 4.0    106 105   CO2 24 20 19*   BUN 28* 26* 26*   CREATININE 1.8* 1.6* 1.8*   GLUCOSE 128* 99 105*     Hepatic:   Recent Labs     05/18/24  0444   AST 9   ALT 6   BILITOT 0.1*   ALKPHOS 97     Troponin: No results for input(s): \"TROPONINI\" in the last 72 hours.  BNP: No results for input(s): \"BNP\" in the last 72 hours.  Lipids: No results for input(s): \"CHOL\", \"HDL\" in the last 72 hours.    Invalid input(s): \"LDLCALCU\"  INR: No results for input(s): \"INR\" in the last 72 hours.      Objective:   Vitals: /66   Pulse 71   Temp 98 °F (36.7 °C) (Oral)   Resp 16   Ht 1.6 m (5' 3\")   Wt 75.6 kg (166 lb 11.2 oz)   SpO2 92%   BMI 29.53 kg/m²   General appearance: alert and cooperative with exam  HEENT: Head: Normocephalic, no  progression or side effects of treatment:  -    Shared decision making:  -    Code status and discussions:  Full          DVT prophylaxis:   [] Lovenox   [x] SCDs   [] SQ Heparin   [] Encourage ambulation, low risk for DVT, no chemical or mechanical    prophylaxis necessary      [] Already on Anticoagulation    Patient Active Problem List:     Vitamin D deficiency     Osteoporosis, senile     Mixed hypercholesterolemia and hypertriglyceridemia     H/O malignant neoplasm of colon     GERD (gastroesophageal reflux disease)     CAD (coronary artery disease)     Benign essential HTN     Osteoarthrosis     Controlled type 2 diabetes mellitus with stage 2 chronic kidney disease, without long-term current use of insulin ([facility])     Major depressive disorder with single episode, in full remission ([facility])     Chronic kidney disease, stage IV (severe) ([facility])     Dupuytren's contracture of right hand     Impaired cognitive ability     UTI (urinary tract infection)          Bryson Ardon MD, MD  [facility] Hospitalist

## 2024-05-18 NOTE — DISCHARGE INSTRUCTIONS
Discharge Instructions    Admission Date:  5/16/2024  Discharge Date: 5/18/24    Disposition:   Home.    Discharge Instructions:  Resume previous home medication.  Take Keflex 500 mg two times a day x 7 days.  Activity:  As tolerated.  Diet:  Diabetic.    Hospital follow up with Dr. Ardon is scheduled for Fri May 24 @ 9:15am.       Contact [facility] if interested in filling out Advanced Directives [phone]  Call [facility] with any questions post discharge [phone]

## 2024-05-20 ENCOUNTER — COMMUNITY CARE MANAGEMENT (OUTPATIENT)
Facility: CLINIC | Age: 89
End: 2024-05-20

## 2024-05-20 ENCOUNTER — TELEPHONE (OUTPATIENT)
Dept: FAMILY MEDICINE CLINIC | Age: 89
End: 2024-05-20

## 2024-05-20 NOTE — TELEPHONE ENCOUNTER
Care Transitions Initial Follow Up Call    Outreach made within 2 business days of discharge: Yes    Patient: Tawanna Antonio Patient : 10/20/1931   MRN: [id number]  Reason for Admission: There are no discharge diagnoses documented for the most recent discharge.  Discharge Date: 24       Spoke with: Deandre (son)    Discharge department/facility: [facility] Interactive Patient Contact:  Was patient able to fill all prescriptions: Yes  Was patient instructed to bring all medications to the follow-up visit: Yes  Is patient taking all medications as directed in the discharge summary? Yes  Does patient understand their discharge instructions: Yes  Does patient have questions or concerns that need addressed prior to 7-14 day follow up office visit: no    Scheduled appointment with PCP within 7-14 days    Follow Up  Future Appointments   Date Time Provider Department Center   2024  9:15 AM Bryson Ardon MD [facility]   10/17/2024  1:15 PM [facility]   2024  9:30 AM Bryson Ardon MD [facility]       BLESSING TRUJILLO

## 2024-05-24 ENCOUNTER — OFFICE VISIT (OUTPATIENT)
Dept: FAMILY MEDICINE CLINIC | Age: 89
End: 2024-05-24

## 2024-05-24 VITALS
HEART RATE: 82 BPM | SYSTOLIC BLOOD PRESSURE: 136 MMHG | HEIGHT: 62 IN | BODY MASS INDEX: 29.81 KG/M2 | WEIGHT: 162 LBS | DIASTOLIC BLOOD PRESSURE: 78 MMHG

## 2024-05-24 DIAGNOSIS — R53.1 WEAKNESS: ICD-10-CM

## 2024-05-24 DIAGNOSIS — N30.00 ACUTE CYSTITIS WITHOUT HEMATURIA: Primary | ICD-10-CM

## 2024-05-24 DIAGNOSIS — Z09 HOSPITAL DISCHARGE FOLLOW-UP: ICD-10-CM

## 2024-05-24 DIAGNOSIS — R11.2 NAUSEA AND VOMITING, UNSPECIFIED VOMITING TYPE: ICD-10-CM

## 2024-05-24 NOTE — PATIENT INSTRUCTIONS
Survey:     You may be receiving a survey from [facility]"[employer]" regarding your visit today.     You may get this in the mail, through your MyChart or in your email.      Please complete the survey to enable us to provide the highest quality of care to you and your family. Please also, mention our names.     If you cannot score us as very good (5 Stars) on any question, please feel free to call the office to discuss how we could have made your experience exceptional.      Thank You!        Dr. Ardon, MD Dockery, SUNITA Schultz, REKHA Moreno CNP, SERGIO Lui MA       Plan:  1. Acute cystitis without hematuria  Doing well after the treatment with IV Rocephin and finishing with Keflex.  No further treatment needed at this time.   Discussed with her son that he should notify us if he notices any change in her mentation which may be reflecting an illness.      2. Nausea and vomiting, unspecified vomiting type  Resolved with treatment of UTI and hydration.    3. Weakness  Has improved since hospitalization.      Virginia is instructed to return to the clinic if the symptoms continue or worsen.  [place]ia  was also instructed to go to the emergency room department if the symptoms significantly worsen before an appointment can be made.

## 2024-05-24 NOTE — PROGRESS NOTES
Post-Discharge Transitional Care  Follow Up      Tawanna Antonio   YOB: 1931    Date of Office Visit:  5/24/2024  Date of Hospital Admission: 5/16/24  Date of Hospital Discharge: 5/18/24  Risk of hospital readmission (high >=14%. Medium >=10%) :Readmission Risk Score: 13.1      Care management risk score Rising risk (score 2-5) and Complex Care (Scores >=6): No Risk Score On File     Non face to face  following discharge, date last encounter closed (first attempt may have been earlier): 05/20/2024    Call initiated 2 business days of discharge: Yes    ASSESSMENT/PLAN:   Acute cystitis without hematuria  Nausea and vomiting, unspecified vomiting type  Weakness      Plan:  1. Acute cystitis without hematuria  Doing well after the treatment with IV Rocephin and finishing with Keflex.  No further treatment needed at this time.   Discussed with her son that he should notify us if he notices any change in her mentation which may be reflecting an illness.      2. Nausea and vomiting, unspecified vomiting type  Resolved with treatment of UTI and hydration.    3. Weakness  Has improved since hospitalization.      Virginia is instructed to return to the clinic if the symptoms continue or worsen.  [place]ia  was also instructed to go to the emergency room department if the symptoms significantly worsen before an appointment can be made.      Medical Decision Making: moderate complexity      On this date 5/24/2024 I have spent 10 minutes reviewing previous notes, test results and face to face with the patient discussing the diagnosis and importance of compliance with the treatment plan as well as documenting on the day of the visit.       Subjective:   HPI:  Follow up of Hospital problems/diagnosis(es): UTI, N/V, dehydration.     Inpatient course: Discharge summary reviewed- see chart.    Interval history/Current status: [place] was admitted to the hospital for UTI, N/V, and dehydration.  Nausea and vomiting

## 2024-05-26 ENCOUNTER — HOSPITAL ENCOUNTER (EMERGENCY)
Age: 89
Discharge: HOME | End: 2024-05-26
Attending: EMERGENCY MEDICINE
Payer: MEDICARE

## 2024-05-26 VITALS
WEIGHT: 162 LBS | HEART RATE: 75 BPM | BODY MASS INDEX: 29.81 KG/M2 | OXYGEN SATURATION: 96 % | RESPIRATION RATE: 16 BRPM | SYSTOLIC BLOOD PRESSURE: 115 MMHG | DIASTOLIC BLOOD PRESSURE: 78 MMHG | TEMPERATURE: 99.1 F | HEIGHT: 62 IN

## 2024-05-26 DIAGNOSIS — R06.02 SHORTNESS OF BREATH: ICD-10-CM

## 2024-05-26 DIAGNOSIS — F41.1 ANXIETY STATE: Primary | ICD-10-CM

## 2024-05-26 LAB
ALBUMIN SERPL-MCNC: 3.8 G/DL (ref 3.5–5.2)
ALP SERPL-CCNC: 116 U/L (ref 35–104)
ALT SERPL-CCNC: 8 U/L (ref 5–33)
ANION GAP SERPL CALCULATED.3IONS-SCNC: 15 MMOL/L (ref 9–17)
AST SERPL-CCNC: 12 U/L
BASOPHILS # BLD: 0.03 K/UL (ref 0–0.2)
BASOPHILS NFR BLD: 0 % (ref 0–2)
BILIRUB SERPL-MCNC: 0.3 MG/DL (ref 0.3–1.2)
BUN SERPL-MCNC: 36 MG/DL (ref 8–23)
BUN/CREAT SERPL: 17 (ref 9–20)
CALCIUM SERPL-MCNC: 9.7 MG/DL (ref 8.6–10.4)
CHLORIDE SERPL-SCNC: 103 MMOL/L (ref 98–107)
CO2 SERPL-SCNC: 20 MMOL/L (ref 20–31)
CREAT SERPL-MCNC: 2.1 MG/DL (ref 0.5–0.9)
EOSINOPHIL # BLD: 0.17 K/UL (ref 0–0.4)
EOSINOPHILS RELATIVE PERCENT: 2 % (ref 0–5)
ERYTHROCYTE [DISTWIDTH] IN BLOOD BY AUTOMATED COUNT: 13.6 % (ref 12.1–15.2)
GFR, ESTIMATED: 22 ML/MIN/1.73M2
GLUCOSE SERPL-MCNC: 124 MG/DL (ref 70–99)
HCT VFR BLD AUTO: 32 % (ref 36–46)
HGB BLD-MCNC: 10.6 G/DL (ref 12–16)
IMM GRANULOCYTES # BLD AUTO: 0.02 K/UL (ref 0–0.3)
IMM GRANULOCYTES NFR BLD: 0 % (ref 0–5)
LYMPHOCYTES NFR BLD: 1.74 K/UL (ref 1–4.8)
LYMPHOCYTES RELATIVE PERCENT: 22 % (ref 15–40)
MCH RBC QN AUTO: 29.3 PG (ref 26–34)
MCHC RBC AUTO-ENTMCNC: 33.1 G/DL (ref 31–37)
MCV RBC AUTO: 88.4 FL (ref 80–100)
MONOCYTES NFR BLD: 0.39 K/UL (ref 0–1)
MONOCYTES NFR BLD: 5 % (ref 4–8)
NEUTROPHILS NFR BLD: 71 % (ref 47–75)
NEUTS SEG NFR BLD: 5.69 K/UL (ref 2.5–7)
PLATELET # BLD AUTO: 223 K/UL (ref 140–450)
PMV BLD AUTO: 9.9 FL (ref 6–12)
POTASSIUM SERPL-SCNC: 4.4 MMOL/L (ref 3.7–5.3)
PROT SERPL-MCNC: 6.7 G/DL (ref 6.4–8.3)
RBC # BLD AUTO: 3.62 M/UL (ref 4–5.2)
SODIUM SERPL-SCNC: 138 MMOL/L (ref 135–144)
WBC OTHER # BLD: 8 K/UL (ref 3.5–11)

## 2024-05-26 PROCEDURE — 99284 EMERGENCY DEPT VISIT MOD MDM: CPT

## 2024-05-26 PROCEDURE — 85025 COMPLETE CBC W/AUTO DIFF WBC: CPT

## 2024-05-26 PROCEDURE — 80053 COMPREHEN METABOLIC PANEL: CPT

## 2024-05-26 PROCEDURE — 36415 COLL VENOUS BLD VENIPUNCTURE: CPT

## 2024-05-26 PROCEDURE — 93005 ELECTROCARDIOGRAM TRACING: CPT | Performed by: EMERGENCY MEDICINE

## 2024-05-26 ASSESSMENT — PAIN SCALES - GENERAL: PAINLEVEL_OUTOF10: 0

## 2024-05-26 ASSESSMENT — PAIN - FUNCTIONAL ASSESSMENT: PAIN_FUNCTIONAL_ASSESSMENT: NONE - DENIES PAIN

## 2024-05-26 NOTE — ED TRIAGE NOTES
Medical and surgical hx verbally reviewed with patient and son. Patient is unablle to tell me her daily medications at this.
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
## 2024-05-27 LAB
EKG ATRIAL RATE: 70 BPM
EKG P AXIS: 59 DEGREES
EKG P-R INTERVAL: 176 MS
EKG Q-T INTERVAL: 362 MS
EKG QRS DURATION: 68 MS
EKG QTC CALCULATION (BAZETT): 390 MS
EKG R AXIS: 59 DEGREES
EKG T AXIS: 95 DEGREES
EKG VENTRICULAR RATE: 70 BPM

## 2024-05-27 PROCEDURE — 93010 ELECTROCARDIOGRAM REPORT: CPT | Performed by: INTERNAL MEDICINE

## 2024-05-28 ENCOUNTER — COMMUNITY CARE MANAGEMENT (OUTPATIENT)
Facility: CLINIC | Age: 89
End: 2024-05-28

## 2024-06-15 PROBLEM — N39.0 UTI (URINARY TRACT INFECTION): Status: RESOLVED | Noted: 2024-05-16 | Resolved: 2024-06-15

## 2024-06-19 DIAGNOSIS — I10 BENIGN ESSENTIAL HTN: ICD-10-CM

## 2024-06-20 DIAGNOSIS — I10 BENIGN ESSENTIAL HTN: ICD-10-CM

## 2024-06-20 RX ORDER — TRIAMTERENE AND HYDROCHLOROTHIAZIDE 37.5; 25 MG/1; MG/1
CAPSULE ORAL
Qty: 90 CAPSULE | Refills: 1 | Status: SHIPPED | OUTPATIENT
Start: 2024-06-20

## 2024-06-20 RX ORDER — LISINOPRIL 10 MG/1
TABLET ORAL
Qty: 90 TABLET | Refills: 1 | Status: SHIPPED | OUTPATIENT
Start: 2024-06-20

## 2024-07-30 DIAGNOSIS — K21.00 GASTROESOPHAGEAL REFLUX DISEASE WITH ESOPHAGITIS: ICD-10-CM

## 2024-07-30 RX ORDER — LANSOPRAZOLE 30 MG/1
CAPSULE, DELAYED RELEASE ORAL
Qty: 30 CAPSULE | Refills: 2 | Status: SHIPPED | OUTPATIENT
Start: 2024-07-30

## 2024-08-29 DIAGNOSIS — F32.A DEPRESSION: ICD-10-CM

## 2024-08-29 RX ORDER — PAROXETINE 20 MG/1
TABLET, FILM COATED ORAL
Qty: 30 TABLET | Refills: 2 | Status: SHIPPED | OUTPATIENT
Start: 2024-08-29

## 2024-10-13 ENCOUNTER — APPOINTMENT (OUTPATIENT)
Dept: GENERAL RADIOLOGY | Age: 89
DRG: 872 | End: 2024-10-13
Payer: MEDICARE

## 2024-10-13 ENCOUNTER — HOSPITAL ENCOUNTER (INPATIENT)
Age: 89
LOS: 5 days | Discharge: NURSING, REHAB OR LONG-TERM CARE | DRG: 872 | End: 2024-10-18
Attending: FAMILY MEDICINE | Admitting: INTERNAL MEDICINE
Payer: MEDICARE

## 2024-10-13 DIAGNOSIS — A41.9 SEPSIS DUE TO URINARY TRACT INFECTION (HCC): Primary | ICD-10-CM

## 2024-10-13 DIAGNOSIS — N39.0 SEPSIS DUE TO URINARY TRACT INFECTION (HCC): Primary | ICD-10-CM

## 2024-10-13 DIAGNOSIS — Z20.822 LAB TEST NEGATIVE FOR COVID-19 VIRUS: ICD-10-CM

## 2024-10-13 LAB
-: ABNORMAL
ALBUMIN SERPL-MCNC: 3.5 G/DL (ref 3.5–5.2)
ALP SERPL-CCNC: 135 U/L (ref 35–104)
ALT SERPL-CCNC: 6 U/L (ref 5–33)
ANION GAP SERPL CALCULATED.3IONS-SCNC: 16 MMOL/L (ref 9–17)
AST SERPL-CCNC: 8 U/L
BACTERIA URNS QL MICRO: ABNORMAL
BASOPHILS # BLD: ABNORMAL K/UL (ref 0–0.2)
BASOPHILS NFR BLD: ABNORMAL % (ref 0–2)
BILIRUB SERPL-MCNC: 0.4 MG/DL (ref 0.3–1.2)
BILIRUB UR QL STRIP: NEGATIVE
BUN SERPL-MCNC: 52 MG/DL (ref 8–23)
BUN/CREAT SERPL: 23 (ref 9–20)
CALCIUM SERPL-MCNC: 10.6 MG/DL (ref 8.6–10.4)
CHLORIDE SERPL-SCNC: 99 MMOL/L (ref 98–107)
CLARITY UR: ABNORMAL
CO2 SERPL-SCNC: 17 MMOL/L (ref 20–31)
COLOR UR: YELLOW
COMMENT: ABNORMAL
CREAT SERPL-MCNC: 2.3 MG/DL (ref 0.5–0.9)
EOSINOPHIL # BLD: ABNORMAL K/UL (ref 0–0.4)
EOSINOPHILS RELATIVE PERCENT: ABNORMAL % (ref 0–5)
EPI CELLS #/AREA URNS HPF: ABNORMAL /HPF
ERYTHROCYTE [DISTWIDTH] IN BLOOD BY AUTOMATED COUNT: 15.8 % (ref 12.1–15.2)
GFR, ESTIMATED: 19 ML/MIN/1.73M2
GLUCOSE BLD-MCNC: 101 MG/DL (ref 65–99)
GLUCOSE SERPL-MCNC: 138 MG/DL (ref 70–99)
GLUCOSE UR STRIP-MCNC: NEGATIVE MG/DL
HCT VFR BLD AUTO: 33.7 % (ref 36–46)
HGB BLD-MCNC: 11 G/DL (ref 12–16)
HGB UR QL STRIP.AUTO: ABNORMAL
IMM GRANULOCYTES # BLD AUTO: ABNORMAL K/UL (ref 0–0.3)
IMM GRANULOCYTES NFR BLD: ABNORMAL %
KETONES UR STRIP-MCNC: ABNORMAL MG/DL
LACTATE BLDV-SCNC: 0.9 MMOL/L (ref 0.5–2.2)
LEUKOCYTE ESTERASE UR QL STRIP: ABNORMAL
LYMPHOCYTES NFR BLD: 5.7 K/UL (ref 1–4.8)
LYMPHOCYTES RELATIVE PERCENT: 23 % (ref 15–40)
MCH RBC QN AUTO: 26.2 PG (ref 26–34)
MCHC RBC AUTO-ENTMCNC: 32.6 G/DL (ref 31–37)
MCV RBC AUTO: 80.2 FL (ref 80–100)
MONOCYTES NFR BLD: 1.24 K/UL (ref 0–1)
MONOCYTES NFR BLD: 5 % (ref 4–8)
MORPHOLOGY: ABNORMAL
NEUTROPHILS NFR BLD: 72 % (ref 47–75)
NEUTS SEG NFR BLD: 17.86 K/UL (ref 2.5–7)
NITRITE UR QL STRIP: NEGATIVE
PH UR STRIP: 8 [PH] (ref 5–8)
PLATELET # BLD AUTO: 517 K/UL (ref 140–450)
PMV BLD AUTO: 9.6 FL (ref 6–12)
POTASSIUM SERPL-SCNC: 3.9 MMOL/L (ref 3.7–5.3)
PROT SERPL-MCNC: 7.9 G/DL (ref 6.4–8.3)
PROT UR STRIP-MCNC: ABNORMAL MG/DL
RBC # BLD AUTO: 4.2 M/UL (ref 4–5.2)
RBC #/AREA URNS HPF: ABNORMAL /HPF (ref 0–2)
SARS-COV-2 RDRP RESP QL NAA+PROBE: NOT DETECTED
SODIUM SERPL-SCNC: 132 MMOL/L (ref 135–144)
SP GR UR STRIP: 1.01 (ref 1–1.03)
SPECIMEN DESCRIPTION: NORMAL
UROBILINOGEN UR STRIP-ACNC: NORMAL EU/DL (ref 0–1)
WBC #/AREA URNS HPF: ABNORMAL /HPF
WBC OTHER # BLD: 24.8 K/UL (ref 3.5–11)

## 2024-10-13 PROCEDURE — 71045 X-RAY EXAM CHEST 1 VIEW: CPT

## 2024-10-13 PROCEDURE — 2580000003 HC RX 258: Performed by: INTERNAL MEDICINE

## 2024-10-13 PROCEDURE — 94761 N-INVAS EAR/PLS OXIMETRY MLT: CPT

## 2024-10-13 PROCEDURE — 87077 CULTURE AEROBIC IDENTIFY: CPT

## 2024-10-13 PROCEDURE — 87635 SARS-COV-2 COVID-19 AMP PRB: CPT

## 2024-10-13 PROCEDURE — 36415 COLL VENOUS BLD VENIPUNCTURE: CPT

## 2024-10-13 PROCEDURE — 6360000002 HC RX W HCPCS: Performed by: INTERNAL MEDICINE

## 2024-10-13 PROCEDURE — 82947 ASSAY GLUCOSE BLOOD QUANT: CPT

## 2024-10-13 PROCEDURE — 51701 INSERT BLADDER CATHETER: CPT

## 2024-10-13 PROCEDURE — 1200000000 HC SEMI PRIVATE

## 2024-10-13 PROCEDURE — 80053 COMPREHEN METABOLIC PANEL: CPT

## 2024-10-13 PROCEDURE — 85025 COMPLETE CBC W/AUTO DIFF WBC: CPT

## 2024-10-13 PROCEDURE — 87040 BLOOD CULTURE FOR BACTERIA: CPT

## 2024-10-13 PROCEDURE — 96374 THER/PROPH/DIAG INJ IV PUSH: CPT

## 2024-10-13 PROCEDURE — 2580000003 HC RX 258: Performed by: FAMILY MEDICINE

## 2024-10-13 PROCEDURE — 6370000000 HC RX 637 (ALT 250 FOR IP): Performed by: INTERNAL MEDICINE

## 2024-10-13 PROCEDURE — 81001 URINALYSIS AUTO W/SCOPE: CPT

## 2024-10-13 PROCEDURE — 99285 EMERGENCY DEPT VISIT HI MDM: CPT

## 2024-10-13 PROCEDURE — 83605 ASSAY OF LACTIC ACID: CPT

## 2024-10-13 PROCEDURE — 87086 URINE CULTURE/COLONY COUNT: CPT

## 2024-10-13 PROCEDURE — 6360000002 HC RX W HCPCS: Performed by: FAMILY MEDICINE

## 2024-10-13 RX ORDER — ONDANSETRON 4 MG/1
4 TABLET, ORALLY DISINTEGRATING ORAL EVERY 8 HOURS PRN
Status: DISCONTINUED | OUTPATIENT
Start: 2024-10-13 | End: 2024-10-18 | Stop reason: HOSPADM

## 2024-10-13 RX ORDER — SODIUM CHLORIDE 0.9 % (FLUSH) 0.9 %
5-40 SYRINGE (ML) INJECTION EVERY 12 HOURS SCHEDULED
Status: DISCONTINUED | OUTPATIENT
Start: 2024-10-13 | End: 2024-10-18 | Stop reason: HOSPADM

## 2024-10-13 RX ORDER — LISINOPRIL 10 MG/1
10 TABLET ORAL DAILY
Status: DISCONTINUED | OUTPATIENT
Start: 2024-10-13 | End: 2024-10-15

## 2024-10-13 RX ORDER — ACETAMINOPHEN 650 MG/1
650 SUPPOSITORY RECTAL EVERY 6 HOURS PRN
Status: DISCONTINUED | OUTPATIENT
Start: 2024-10-13 | End: 2024-10-18 | Stop reason: HOSPADM

## 2024-10-13 RX ORDER — SODIUM CHLORIDE 0.9 % (FLUSH) 0.9 %
5-40 SYRINGE (ML) INJECTION PRN
Status: DISCONTINUED | OUTPATIENT
Start: 2024-10-13 | End: 2024-10-18 | Stop reason: HOSPADM

## 2024-10-13 RX ORDER — ACETAMINOPHEN 325 MG/1
650 TABLET ORAL EVERY 6 HOURS PRN
Status: DISCONTINUED | OUTPATIENT
Start: 2024-10-13 | End: 2024-10-18 | Stop reason: HOSPADM

## 2024-10-13 RX ORDER — SODIUM CHLORIDE 9 MG/ML
INJECTION, SOLUTION INTRAVENOUS PRN
Status: DISCONTINUED | OUTPATIENT
Start: 2024-10-13 | End: 2024-10-18 | Stop reason: HOSPADM

## 2024-10-13 RX ORDER — POLYETHYLENE GLYCOL 3350 17 G/17G
17 POWDER, FOR SOLUTION ORAL DAILY PRN
Status: DISCONTINUED | OUTPATIENT
Start: 2024-10-13 | End: 2024-10-18 | Stop reason: HOSPADM

## 2024-10-13 RX ORDER — HEPARIN SODIUM 5000 [USP'U]/ML
5000 INJECTION, SOLUTION INTRAVENOUS; SUBCUTANEOUS 2 TIMES DAILY
Status: DISCONTINUED | OUTPATIENT
Start: 2024-10-13 | End: 2024-10-18 | Stop reason: HOSPADM

## 2024-10-13 RX ORDER — INSULIN LISPRO 100 [IU]/ML
0-4 INJECTION, SOLUTION INTRAVENOUS; SUBCUTANEOUS
Status: DISCONTINUED | OUTPATIENT
Start: 2024-10-13 | End: 2024-10-18 | Stop reason: HOSPADM

## 2024-10-13 RX ORDER — SODIUM CHLORIDE 9 MG/ML
INJECTION, SOLUTION INTRAVENOUS CONTINUOUS
Status: ACTIVE | OUTPATIENT
Start: 2024-10-13 | End: 2024-10-14

## 2024-10-13 RX ORDER — ATORVASTATIN CALCIUM 20 MG/1
20 TABLET, FILM COATED ORAL DAILY
Status: DISCONTINUED | OUTPATIENT
Start: 2024-10-13 | End: 2024-10-18 | Stop reason: HOSPADM

## 2024-10-13 RX ORDER — VITAMIN B COMPLEX
2000 TABLET ORAL DAILY
Status: DISCONTINUED | OUTPATIENT
Start: 2024-10-13 | End: 2024-10-18 | Stop reason: HOSPADM

## 2024-10-13 RX ORDER — GLUCAGON 1 MG/ML
1 KIT INJECTION PRN
Status: DISCONTINUED | OUTPATIENT
Start: 2024-10-13 | End: 2024-10-18 | Stop reason: HOSPADM

## 2024-10-13 RX ORDER — TRAZODONE HYDROCHLORIDE 50 MG/1
25 TABLET, FILM COATED ORAL NIGHTLY
Status: DISCONTINUED | OUTPATIENT
Start: 2024-10-13 | End: 2024-10-15

## 2024-10-13 RX ORDER — PANTOPRAZOLE SODIUM 40 MG/1
40 TABLET, DELAYED RELEASE ORAL
Status: DISCONTINUED | OUTPATIENT
Start: 2024-10-14 | End: 2024-10-18 | Stop reason: HOSPADM

## 2024-10-13 RX ORDER — ASPIRIN 81 MG/1
81 TABLET, CHEWABLE ORAL 2 TIMES DAILY
Status: DISCONTINUED | OUTPATIENT
Start: 2024-10-13 | End: 2024-10-18 | Stop reason: HOSPADM

## 2024-10-13 RX ORDER — ONDANSETRON 2 MG/ML
4 INJECTION INTRAMUSCULAR; INTRAVENOUS EVERY 6 HOURS PRN
Status: DISCONTINUED | OUTPATIENT
Start: 2024-10-13 | End: 2024-10-18 | Stop reason: HOSPADM

## 2024-10-13 RX ORDER — TRIAMTERENE AND HYDROCHLOROTHIAZIDE 37.5; 25 MG/1; MG/1
1 TABLET ORAL DAILY
Status: DISCONTINUED | OUTPATIENT
Start: 2024-10-13 | End: 2024-10-15

## 2024-10-13 RX ORDER — PAROXETINE 20 MG/1
20 TABLET, FILM COATED ORAL DAILY
Status: DISCONTINUED | OUTPATIENT
Start: 2024-10-13 | End: 2024-10-18 | Stop reason: HOSPADM

## 2024-10-13 RX ORDER — DEXTROSE MONOHYDRATE 100 MG/ML
INJECTION, SOLUTION INTRAVENOUS CONTINUOUS PRN
Status: DISCONTINUED | OUTPATIENT
Start: 2024-10-13 | End: 2024-10-18 | Stop reason: HOSPADM

## 2024-10-13 RX ORDER — 0.9 % SODIUM CHLORIDE 0.9 %
500 INTRAVENOUS SOLUTION INTRAVENOUS ONCE
Status: COMPLETED | OUTPATIENT
Start: 2024-10-13 | End: 2024-10-13

## 2024-10-13 RX ADMIN — SODIUM CHLORIDE, PRESERVATIVE FREE 10 ML: 5 INJECTION INTRAVENOUS at 20:43

## 2024-10-13 RX ADMIN — CHOLECALCIFEROL TAB 25 MCG (1000 UNIT) 2000 UNITS: 25 TAB at 20:42

## 2024-10-13 RX ADMIN — PAROXETINE HYDROCHLORIDE 20 MG: 20 TABLET, FILM COATED ORAL at 20:43

## 2024-10-13 RX ADMIN — TRIAMTERENE AND HYDROCHLOROTHIAZIDE 1 TABLET: 37.5; 25 TABLET ORAL at 20:43

## 2024-10-13 RX ADMIN — TRAZODONE HYDROCHLORIDE 25 MG: 50 TABLET ORAL at 20:43

## 2024-10-13 RX ADMIN — ASPIRIN 81 MG 81 MG: 81 TABLET ORAL at 20:43

## 2024-10-13 RX ADMIN — SODIUM CHLORIDE: 9 INJECTION, SOLUTION INTRAVENOUS at 20:15

## 2024-10-13 RX ADMIN — HEPARIN SODIUM 5000 UNITS: 5000 INJECTION INTRAVENOUS; SUBCUTANEOUS at 20:42

## 2024-10-13 RX ADMIN — LISINOPRIL 10 MG: 10 TABLET ORAL at 20:42

## 2024-10-13 RX ADMIN — SODIUM CHLORIDE 500 ML: 9 INJECTION, SOLUTION INTRAVENOUS at 18:13

## 2024-10-13 RX ADMIN — ATORVASTATIN CALCIUM 20 MG: 20 TABLET, FILM COATED ORAL at 20:42

## 2024-10-13 RX ADMIN — WATER 1000 MG: 1 INJECTION INTRAMUSCULAR; INTRAVENOUS; SUBCUTANEOUS at 18:14

## 2024-10-13 ASSESSMENT — PAIN - FUNCTIONAL ASSESSMENT: PAIN_FUNCTIONAL_ASSESSMENT: NONE - DENIES PAIN

## 2024-10-13 NOTE — ED PROVIDER NOTES
[facility]  EMERGENCY DEPARTMENT ENCOUNTER      Pt Name: Tawanna Antonio  MRN: [id number]  Birthdate 10/20/1931  Date of evaluation: 10/13/2024  Provider: Jeovanny Recio MD    CHIEF COMPLAINT       Chief Complaint   Patient presents with    Altered Mental Status     Brought in by [facility] after call from patient son. Son reports a decline in patients mental status and ability to care for herself.  Unknown if she has been taking her medications. EMS stated patient was unable to ambulate today.         HISTORY OF PRESENT ILLNESS      Tawanna Antonio is a 89 y.o. female who presents to the emergency department via EMS from home, patient's son had called EMS as he gone to check on her, is the past 2 weeks patient's had some change in mentation, has felt somewhat weak, and there is no some odorous urine to her.  No reported trauma or falls.  No reported vomiting or diarrhea.  Patient's had decreased ability to take care of herself per son.    PCP: Back        REVIEW OF SYSTEMS       Review of Systems   Unable to perform ROS: Acuity of condition         PAST MEDICAL HISTORY     Past Medical History:   Diagnosis Date    Cancer (HCC)     colon    Colon cancer (HCC) 10/02    Diabetes mellitus (HCC)     Esophageal reflux     Heart disease     Hypercholesterolemia     Hypertension     Microalbuminuria     Osteoarthrosis     Osteoporosis          SURGICAL HISTORY       Past Surgical History:   Procedure Laterality Date    COLONOSCOPY  1/09, 5/1/12    COLONOSCOPY  07/10/2017    Dr. Ardon:  3 adenomatous polyps.    DIAGNOSTIC CARDIAC CATH LAB PROCEDURE  2/28/08    HEMICOLECTOMY  10/02    HYSTERECTOMY (CERVIX STATUS UNKNOWN)      KNEE SURGERY  1973    NE COLON CA SCRN NOT HI RSK IND N/A 7/10/2017    COLONOSCOPY performed by Bryson Ardon MD at [facility] OR         CURRENT MEDICATIONS       Current Discharge Medication List        CONTINUE these medications which have NOT CHANGED    Details   PARoxetine (PAXIL) 20 MG tablet  (electronically signed)  Attending Emergency Physician           Jeovanny Recio MD  10/14/24 5876

## 2024-10-13 NOTE — ED NOTES
Patient alert to self and place. Patient able to speak in short sentences with no slurring of speech or SOB noted at this time. Patient C/O of needing to urinate. Patient had multiple episodes of incontinence with malodorous urine. Pants patient arrived in were soaked. Clothes removed and bagged, patient wiped up, bedding changed. Patient straight cathed to obtain clean sample. brief and purewick applied. Patient given warm blankets.

## 2024-10-13 NOTE — ED TRIAGE NOTES
[facility] stated patient son doesn't know if she has taken her meds. [facility] stated the home had medication bottles everywhere. Updated list based off of after visit summary from 5/2/24

## 2024-10-14 PROBLEM — E44.0 MODERATE MALNUTRITION (HCC): Status: ACTIVE | Noted: 2024-10-14

## 2024-10-14 LAB
ANION GAP SERPL CALCULATED.3IONS-SCNC: 12 MMOL/L (ref 9–17)
BASOPHILS # BLD: 0.01 K/UL (ref 0–0.2)
BASOPHILS NFR BLD: 0 % (ref 0–2)
BUN SERPL-MCNC: 48 MG/DL (ref 8–23)
BUN/CREAT SERPL: 22 (ref 9–20)
CALCIUM SERPL-MCNC: 9.2 MG/DL (ref 8.6–10.4)
CHLORIDE SERPL-SCNC: 107 MMOL/L (ref 98–107)
CO2 SERPL-SCNC: 16 MMOL/L (ref 20–31)
CREAT SERPL-MCNC: 2.2 MG/DL (ref 0.5–0.9)
EOSINOPHIL # BLD: 0.09 K/UL (ref 0–0.4)
EOSINOPHILS RELATIVE PERCENT: 1 % (ref 0–5)
ERYTHROCYTE [DISTWIDTH] IN BLOOD BY AUTOMATED COUNT: 15.9 % (ref 12.1–15.2)
EST. AVERAGE GLUCOSE BLD GHB EST-MCNC: 111 MG/DL
GFR, ESTIMATED: 21 ML/MIN/1.73M2
GLUCOSE BLD-MCNC: 110 MG/DL (ref 65–99)
GLUCOSE BLD-MCNC: 97 MG/DL (ref 65–99)
GLUCOSE BLD-MCNC: 98 MG/DL (ref 65–99)
GLUCOSE SERPL-MCNC: 109 MG/DL (ref 70–99)
HBA1C MFR BLD: 5.5 % (ref 4–6)
HCT VFR BLD AUTO: 25.5 % (ref 36–46)
HGB BLD-MCNC: 8.4 G/DL (ref 12–16)
IMM GRANULOCYTES # BLD AUTO: 0.03 K/UL (ref 0–0.3)
IMM GRANULOCYTES NFR BLD: 0 % (ref 0–5)
LACTATE BLDV-SCNC: 1.1 MMOL/L (ref 0.5–2.2)
LYMPHOCYTES NFR BLD: 2.13 K/UL (ref 1–4.8)
LYMPHOCYTES RELATIVE PERCENT: 18 % (ref 15–40)
MCH RBC QN AUTO: 26.8 PG (ref 26–34)
MCHC RBC AUTO-ENTMCNC: 32.9 G/DL (ref 31–37)
MCV RBC AUTO: 81.2 FL (ref 80–100)
MICROORGANISM SPEC CULT: ABNORMAL
MONOCYTES NFR BLD: 0.42 K/UL (ref 0–1)
MONOCYTES NFR BLD: 4 % (ref 4–8)
NEUTROPHILS NFR BLD: 78 % (ref 47–75)
NEUTS SEG NFR BLD: 9.35 K/UL (ref 2.5–7)
PLATELET # BLD AUTO: 315 K/UL (ref 140–450)
PMV BLD AUTO: 9.5 FL (ref 6–12)
POTASSIUM SERPL-SCNC: 3.8 MMOL/L (ref 3.7–5.3)
RBC # BLD AUTO: 3.14 M/UL (ref 4–5.2)
SODIUM SERPL-SCNC: 135 MMOL/L (ref 135–144)
SPECIMEN DESCRIPTION: ABNORMAL
WBC OTHER # BLD: 12 K/UL (ref 3.5–11)

## 2024-10-14 PROCEDURE — 80048 BASIC METABOLIC PNL TOTAL CA: CPT

## 2024-10-14 PROCEDURE — 2580000003 HC RX 258: Performed by: INTERNAL MEDICINE

## 2024-10-14 PROCEDURE — 97162 PT EVAL MOD COMPLEX 30 MIN: CPT

## 2024-10-14 PROCEDURE — 6370000000 HC RX 637 (ALT 250 FOR IP): Performed by: INTERNAL MEDICINE

## 2024-10-14 PROCEDURE — 6360000002 HC RX W HCPCS: Performed by: INTERNAL MEDICINE

## 2024-10-14 PROCEDURE — 36415 COLL VENOUS BLD VENIPUNCTURE: CPT

## 2024-10-14 PROCEDURE — 82947 ASSAY GLUCOSE BLOOD QUANT: CPT

## 2024-10-14 PROCEDURE — 1200000000 HC SEMI PRIVATE

## 2024-10-14 PROCEDURE — 94761 N-INVAS EAR/PLS OXIMETRY MLT: CPT

## 2024-10-14 PROCEDURE — 85025 COMPLETE CBC W/AUTO DIFF WBC: CPT

## 2024-10-14 PROCEDURE — 2700000000 HC OXYGEN THERAPY PER DAY

## 2024-10-14 PROCEDURE — 97166 OT EVAL MOD COMPLEX 45 MIN: CPT

## 2024-10-14 PROCEDURE — 83605 ASSAY OF LACTIC ACID: CPT

## 2024-10-14 PROCEDURE — 83036 HEMOGLOBIN GLYCOSYLATED A1C: CPT

## 2024-10-14 RX ORDER — 0.9 % SODIUM CHLORIDE 0.9 %
250 INTRAVENOUS SOLUTION INTRAVENOUS ONCE
Status: COMPLETED | OUTPATIENT
Start: 2024-10-14 | End: 2024-10-14

## 2024-10-14 RX ADMIN — HEPARIN SODIUM 5000 UNITS: 5000 INJECTION INTRAVENOUS; SUBCUTANEOUS at 21:02

## 2024-10-14 RX ADMIN — ASPIRIN 81 MG 81 MG: 81 TABLET ORAL at 20:59

## 2024-10-14 RX ADMIN — TRAZODONE HYDROCHLORIDE 25 MG: 50 TABLET ORAL at 20:59

## 2024-10-14 RX ADMIN — WATER 1000 MG: 1 INJECTION INTRAMUSCULAR; INTRAVENOUS; SUBCUTANEOUS at 16:40

## 2024-10-14 RX ADMIN — PANTOPRAZOLE SODIUM 40 MG: 40 TABLET, DELAYED RELEASE ORAL at 06:37

## 2024-10-14 RX ADMIN — PAROXETINE HYDROCHLORIDE 20 MG: 20 TABLET, FILM COATED ORAL at 08:13

## 2024-10-14 RX ADMIN — CHOLECALCIFEROL TAB 25 MCG (1000 UNIT) 2000 UNITS: 25 TAB at 08:13

## 2024-10-14 RX ADMIN — ASPIRIN 81 MG 81 MG: 81 TABLET ORAL at 08:13

## 2024-10-14 RX ADMIN — HEPARIN SODIUM 5000 UNITS: 5000 INJECTION INTRAVENOUS; SUBCUTANEOUS at 08:13

## 2024-10-14 RX ADMIN — SODIUM CHLORIDE 250 ML: 9 INJECTION, SOLUTION INTRAVENOUS at 12:38

## 2024-10-14 RX ADMIN — SODIUM CHLORIDE, PRESERVATIVE FREE 10 ML: 5 INJECTION INTRAVENOUS at 08:18

## 2024-10-14 RX ADMIN — SODIUM CHLORIDE: 9 INJECTION, SOLUTION INTRAVENOUS at 08:21

## 2024-10-14 RX ADMIN — SODIUM CHLORIDE, PRESERVATIVE FREE 10 ML: 5 INJECTION INTRAVENOUS at 21:02

## 2024-10-14 RX ADMIN — ATORVASTATIN CALCIUM 20 MG: 20 TABLET, FILM COATED ORAL at 08:13

## 2024-10-14 NOTE — CARE COORDINATION
Case Management Assessment  Initial Evaluation    Date/Time of Evaluation: 10/14/2024 12:18 PM  Assessment Completed by: Nirmal Nieves RN    If patient is discharged prior to next notation, then this note serves as note for discharge by case management.    Patient Name: Tawanna Antonio                   YOB: 1931  Diagnosis: Sepsis secondary to UTI (HCC) [A41.9, N39.0]                   Date / Time: 10/13/2024  3:25 PM    Patient Admission Status: Inpatient   Readmission Risk (Low < 19, Mod (19-27), High > 27): Readmission Risk Score: 15.9    Current PCP: Bryson Ardon MD  PCP verified by CM? (P) Yes (Dr Ardon)    Chart Reviewed: Yes      History Provided by: (P) Medical Record  Patient Orientation: (P) Self    Patient Cognition: (P) Short Term Memory Deficit    Hospitalization in the last 30 days (Readmission):  No    If yes, Readmission Assessment in CM Navigator will be completed.    Advance Directives:      Code Status: Full Code   Patient's Primary Decision Maker is: (P) Legal Next of Kin    Primary Decision Maker: Deandre Costello - Child - [phone]    Secondary Decision Maker: PacoHayes  Rock Child - [phone]    Discharge Planning:    Patient lives with: (P) Alone Type of Home: (P) House  Primary Care Giver: (P) Self  Patient Support Systems include: (P) Children, Family Members   Current Financial resources: (P) Medicare  Current community resources: (P) None  Current services prior to admission: (P) None            Current DME:              Type of Home Care services:  (P) None    ADLS  Prior functional level: (P) Independent in ADLs/IADLs  Current functional level: (P) Assistance with the following:, Dressing, Cooking, Housework, Shopping, Mobility, Bathing, Toileting    PT AM-PAC:   /24  OT AM-PAC:   /24    Family can provide assistance at DC: (P) No  Would you like Case Management to discuss the discharge plan with any other family members/significant others, and if so, who? (P)  Patient Representative Agree with the Discharge Plan? (P) Yes    Nirmal Nieves RN  Case Management Department  Ph: [phone] Fax: [phone]

## 2024-10-14 NOTE — PLAN OF CARE
[facility]  Inpatient Physical Therapy  Plan of Care  Date: 10/14/2024  Patient Name: Tawanna Antonio        : 10/20/1931  (89 y.o.)  Referring Practitioner: Dr. Chris Guzman  Admission Date: 10/13/2024  Referral Date : 10/14/24  Diagnosis: Sepsis due to UTI  Treatment Diagnosis: Gait ataxia  PT Orders Received and Evaluation Complete  Identified Problem Areas:  Therapy Prognosis: Fair  Performance Deficits/Impairments: Decreased functional mobility , Decreased ADL status, Decreased strength, Decreased endurance, Decreased balance, Decreased posture, Decreased high-level IADLs, Decreased safe awareness    Justification for Skilled Services:  [] Reduce Falls   [x] Improve Ambulation  [x]  Complete Daily Tasks Safely   [x] Improve Balance   [x] Improve LE strength  [x]  Return to Prior Level of Function  [x] Improve Functional Mobility   [x]  Family/Caregiver Education  [x] Patient Education: [x]Assistive Devices [x]Home Exercise Program and Progression    Plan  Frequency: 1-2x/day Daily M-F, 1x/day Sat-Sun    Duration:  8days  [] Modalities:  [x] Therapeutic Exercise   [x] Gait Training  [] Therapeutic Activity    [x] Home Safety Evaluation         [] Massage                        [x] Neuromuscular Re-education [] Back Education             [x] Patient Education [x] Home Exercise Program       Rehab Potential:  [x]  Good [] Fair   []  Poor    Goals  Short Term Goals  Time Frame for Short Term Goals: STG=LTG    Long Term Goals  Time Frame for Long Term Goals : 8 days (POC EXP on 10/21/24)  Long Term Goal 1: Pt to complete transfers sit to stand SBA to least restrictive AD.  Long Term Goal 2: Pt to amb 75ft with least restrictive AD SBA to improve west to household amb distance.  Long Term Goal 3: Pt to complete supine to sit Mary Alice with use of bedrail to improve independence of bed mobility.    Upon Swingbed Transfer this Plan of Care will be followed until revision is complete.    Morelia JONES

## 2024-10-14 NOTE — THERAPY EVALUATION
[facility]  Physical Therapy  Evaluation  Date: 10/14/2024  Patient Name: Tawanna Antonio        MRN: [id number]    : 10/20/1931  (89 y.o.)  Gender: female   Referring Practitioner: Dr. Chris Guzman  Diagnosis: Sepsis due to UTI  Additional Pertinent Hx: Pt admit due to increased confusion and decrease ability to amb. Pt found to have UTI and dx with Sepsis   Past Medical History:   Diagnosis Date    Cancer (HCC)     colon    Colon cancer (HCC) 10/02    Diabetes mellitus (HCC)     Esophageal reflux     Heart disease     Hypercholesterolemia     Hypertension     Microalbuminuria     Osteoarthrosis     Osteoporosis      Past Surgical History:   Procedure Laterality Date    COLONOSCOPY  , 12    COLONOSCOPY  07/10/2017    Dr. Ardon:  3 adenomatous polyps.    DIAGNOSTIC CARDIAC CATH LAB PROCEDURE  08    HEMICOLECTOMY  10/02    HYSTERECTOMY (CERVIX STATUS UNKNOWN)      KNEE SURGERY      NM COLON CA SCRN NOT HI RSK IND N/A 7/10/2017    COLONOSCOPY performed by Bryson Ardon MD at [facility] OR       Restrictions   NA      Orientation  Overall Orientation Status: Impaired  Orientation Level: Oriented to person, Oriented to place    Home Living / Prior Level of Function  Social/Functional History  Lives With: Alone  Type of Home: Condo  Home Layout: One level  Home Access: Level entry  Bathroom Shower/Tub: Walk-in shower  Bathroom Toilet: Standard  Bathroom Equipment: Grab bars in shower  Home Equipment: Walker - Rolling  Receives Help From: Family  ADL Assistance: Independent  Homemaking Assistance: Needs assistance  Meal Prep: Total  Laundry:  (Independent)  Driving:  (Pt does drive but family has concerns with her driving and may be removing keys)  Shopping: Total  Ambulation Assistance: Independent  Transfer Assistance: Independent  Active : Yes  Patient's  Info: family is able to provide  Mode of Transportation: Car  Occupation: Retired  Additional Comments: Pt family provides her  meals, pt does light cooking(microwave)  Hearing  Hearing: Exceptions to WFL  Hearing Exceptions: Hard of hearing/hearing concerns, Bilateral hearing aid    Objective  Strength RLE  R Hip Flexion: 3+/5  R Knee Extension: 4/5  Strength LLE  L Hip Flexion: 3+/5  L Knee Extension: 4/5       Sit to Stand: Moderate Assistance  Stand to Sit: Moderate Assistance  Bed to Chair: Moderate assistance    Ambulation  Surface: Level tile  Device: Rolling Walker  Assistance: Contact guard assistance, Minimal assistance  Quality of Gait: Initally pt amb with scissoring gait due to not wanting to \"have an accident\" requiring min A for balance after multiple Max cues pt amb with typical step pattern.  Gait Deviations: Slow Dea, Decreased step length  Distance: 15ft  Comments: Pt amb from bathroom to bedside chair CGA with ww       Assessment  Activity Tolerance: Patient tolerated evaluation without incident   Chart Reviewed: Yes  Assessment: Pt at this time requires mod A for transfers sit to stand from all seat surfaces.  Pt amb with ww 15ft CGA to access BR this date and required assistance for pericare.  Pt lives alone and in order to return home will have to be independent with all transfers and amb.  Per her son Deandre pt amb without AD prior to this onset.  Therapy Prognosis: Fair           Plan  Frequency: 1-2x/day Daily M-F, 1x/day Sat-Sun    Duration:  8days     Goals  Short Term Goals  Time Frame for Short Term Goals: STG=LTG    Long Term Goals  Time Frame for Long Term Goals : 8 days (POC EXP on 10/21/24)  Long Term Goal 1: Pt to complete transfers sit to stand SBA to least restrictive AD.  Long Term Goal 2: Pt to amb 75ft with least restrictive AD SBA to improve west to household amb distance.  Long Term Goal 3: Pt to complete supine to sit Mary Alice with use of bedrail to improve independence of bed mobility.    Timed Code Treatment Minutes: 0 Minutes  PT Individual Minutes  Time In: 1308  Time Out: 1340  Minutes: 32  Timed

## 2024-10-14 NOTE — PROGRESS NOTES
Patient arrives to unit via cart. Assist x 3 staff to bed. Report received from Sandra FRANCE. IV fluids initiated.  This nurse educates patient on room, call light system, bed rails, phone, lights, dry erase board and bathroom. Vital signs obtained. Nursing assessment complete. Telemetry monitor applied. Attempts to review orders with patient however patient is hard of hearing and only oriented to self. Admission questions are unable to be answered with Patient as source. Patient is a high fall risk, discussed such with patient, as well as, fall prevention strategies. Ice water and snacks provided. Patient took pills whole one at a time with water. All care needs addressed with no further needs at this time.     VITALS:  /72   Pulse 98   Temp 97.8 °F (36.6 °C) (Temporal)   Resp 18   Ht 1.588 m (5' 2.5\")   Wt 61.3 kg (135 lb 1.6 oz)   SpO2 95%   BMI 24.32 kg/m²

## 2024-10-14 NOTE — PROGRESS NOTES
Patient repeatedly called out to get up and use the restroom.     Attempted to ambulate patient to commode with x2 nurses and walker.    Patient gets to the side of the bed with assistance. Patient has difficulty standing on her own and when we attempted to pivot to the commode the patient would not move her feet.    Patient incontinent of urine, purewick placed.     Patient back in bed with call light within reach.

## 2024-10-14 NOTE — PROGRESS NOTES
RN case manager and SW met with pt and son Saravanan to complete assessment. Pt's son points at SW and states that SW must talk to the person on the phone in the room. SW asks who this is and son states that it is his wife. SW speaks with dtr in law and she is concerned about when pt is coming home and what to do with her dog. Dtr in law states that they cannot bring it to their homes as it makes a mess and cannot be left alone and she wants to have it boarded at the vet's office if she isn't coming home today. SW informed that there was no discharge order planned for the day and dtr in law states that's he will take the dog to the vet then. SW attempted to discuss discharge plans for pt with son present and with dtr in law on the phone and they defer to Deandre, pt's son. Pt is a 89 year old female admitted for sepsis secondary to UTI. Pt is alert and very hard of hearing. Pt lives alone in her home in [place]. Pt uses no DME or community services at home. Pt will sometimes drive and other times family will assist.     Pt is a full code and follows with Dr Ardon as PCP. Pt does not have advance directives on file and she and son that is present defer decisions to Deandre. SW asked son present about code status and he also defers this to Deandre. Unable to complete ACP note at this time. Pt reports that her medications have been affordable.     Attempted to call son, Deandre, and phone number listed has been disconnected and there is no other number available. Present son states that Deandre may be in this afternoon and SW will attempt to discuss with him if he comes in. SW following. Syeda ORLANDO 10/14/2024

## 2024-10-14 NOTE — H&P
10/13/24  5:22 PM    Specimen: Blood   Result Value Ref Range    Specimen Description .BLOOD     Special Requests 20ML     Culture NO GROWTH 12 HOURS    COVID-19, Rapid    Collection Time: 10/13/24  5:24 PM    Specimen: Nasopharyngeal Swab   Result Value Ref Range    Specimen Description .NASOPHARYNGEAL SWAB     SARS-CoV-2, Rapid Not Detected Not Detected   Glucose, Whole Blood    Collection Time: 10/13/24  8:31 PM   Result Value Ref Range    POC Glucose 101 (H) 65 - 99 mg/dL       Radiology:     XR CHEST PORTABLE    Result Date: 10/13/2024  EXAM: XR CHEST PORTABLE HISTORY: ?cough, altered mental status COMPARISON: Two-view chest from 3/27/2023. TECHNIQUE: Portable chest done at 3:53 PM. FINDINGS: Trachea is midline. Mediastinum is not widened. Heart size is unremarkable. There is moderate calcified plaquing of aortic arch. The lungs show mild hyperaeration with chronic changes. Diaphragm is intact. Osteopenic and degenerative changes are noted.     Nonacute portable chest.       EKG:         Medical Decision Making (MDM) Data:    External documents reviewed:      My CXR interpretation:     My EKG interpretation:       Discussed with:  Dr. Waqar Ardon    Tests considered but not ordered:      Heart score:      Social Determinants of Health that impact treatment or disposition:            Assessment and Plan     1.  Sepsis due to UTI -continue on IV Rocephin started on 10/13, urine and blood cultures pending  2.  Acute on CKD stage IIIb -most likely secondary to dehydration, continue on IV fluids, monitor renal function, hold lisinopril and Dyazide  3.  Altered mental status due to sepsis  4.  Generalized weakness-will consult PT and OT for evaluation, most likely needs ECF placement  5.  Chronic anemia secondary to chronic disease/CKD -monitor H&H  6.  History of hypertension -patient hypotensive due to sepsis, will hold lisinopril and Dyazide  7.  History of diabetes mellitus type 2, non-insulin-dependent -placed on  the Medical Record      ( X)  I have utilized all available immediate resources to obtain, update, or review the patient's current medications (including all prescriptions, over-the-counter products, herbals, cannabis / cannabidiol products, vitamin / mineral / dietary (nutritional) supplements).  (Satisfies MIPS Performance)  If Yes, Stop Here  ( )  The patient is not eligible for medication reconciliation; the patient is in an emergent medical situation where delaying treatment would jeopardize the patient's health.  (MIPS Performance exception / exclusion)  ( )  I did not confirm, update or review the patient's current list of medications today.  (Does not satisfy MIPS Performance)          Advanced Care Plan        ( X)  I confirmed that the patient's Advanced Care Plan is present, code status documented, or surrogate decision maker is listed in the patient's medical record.  ( )  The patient's advanced care plan is not present because:  (select)   ( ) I confirmed today that the patient does not wish or was not able to name a surrogate decision maker or provide an Advance Care Plan.   ( ) Hospice care is currently being provided or has been provided this calender year.  ( )  I did not confirm today the presence of an Advance Care Plan or surrogate decision maker documented within the patient's medical record. (Does not satisfy MIPS performance).            Chris Guzman MD, MD  Admitting Hospitalist

## 2024-10-14 NOTE — THERAPY EVALUATION
[facility]  Phone: [phone]    Occupational Therapy Evaluation    Date: 10/14/2024  Patient Name: Tawanna Antonio        MRN: [id number]    : 10/20/1931  (89 y.o.)  Gender: female   Referring Practitioner: Dr. Guzman  Diagnosis: Sepsis secondary to UTI  Additional Pertinent Hx: Admitted to [facility] on 10/13/24 due to sepsis secondary to UTI. Referred to OT services to assess ability to care for self.  Past Medical History:   Diagnosis Date    Cancer (HCC)     colon    Colon cancer (HCC) 10/02    Diabetes mellitus (HCC)     Esophageal reflux     Heart disease     Hypercholesterolemia     Hypertension     Microalbuminuria     Osteoarthrosis     Osteoporosis      Past Surgical History:   Procedure Laterality Date    COLONOSCOPY  , 12    COLONOSCOPY  07/10/2017    Dr. Ardon:  3 adenomatous polyps.    DIAGNOSTIC CARDIAC CATH LAB PROCEDURE  08    HEMICOLECTOMY  10/02    HYSTERECTOMY (CERVIX STATUS UNKNOWN)      KNEE SURGERY      AK COLON CA SCRN NOT HI RSK IND N/A 7/10/2017    COLONOSCOPY performed by Bryson Ardon MD at [facility] OR     Subjective:     Subjective: Patient was sitting in recliner with son present upon OT arrival. Was agreeable to OT evaluation.    Objective:     Social/Functional History:  Lives With: Alone  Type of Home: Condo  Home Layout: One level  Home Access: Level entry  Bathroom Shower/Tub: Walk-in shower  Bathroom Toilet: Standard  Bathroom Equipment: Grab bars in shower  Home Equipment: Walker - Rolling    PLOF:  Receives Help From: Family  ADL Assistance: Independent  Homemaking Assistance: Needs assistance  Ambulation Assistance: Independent  Transfer Assistance: Independent    ADLs:  Feeding: Setup  Grooming: Contact guard assistance  UE Bathing: Contact guard assistance  LE Bathing: Minimal assistance  UE Dressing: Contact guard assistance  LE Dressing: Minimal assistance  Toileting: Minimal assistance    Transfers:  Sit to stand: Moderate  assistance, 2 Person assistance   Stand to sit: Minimal assistance, 2 Person assistance  Toilet Transfer: Moderate assistance, 2 Person assistance (with 3-in-1 commode positioned over toilet)    Assessment:     Performance deficits / Impairments: Decreased functional mobility , Decreased ADL status, Decreased ROM, Decreased strength, Decreased safe awareness, Decreased cognition, Decreased endurance, Decreased balance, Decreased vision/visual deficit, Decreased high-level IADLs, Decreased fine motor control, Decreased coordination, Decreased posture    Assessment: Patient was sitting in recliner with son present upon OT arrival. Was agreeable to OT evaluation. Completed ADLs and functional transfers as documented above (based on clinical reasoning and observation). Performed functional mobility to/from bathroom via FWW/gait belt with CGA x2 and cues for walker negotiation. Required increased time and maximum verbal cues to initiate sit to stand transfer from bedside recliner. Upon standing, patient was noted to have BLE crossed and required maximum cues from therapist for proper body posture to reduce the risk of a fall. Limited AROM was noted during right composite fist formation; contracture was noted in the 5th digit and patient was unable to touch DPC with 3rd digit. Was able to complete LB dressing task (doffing/re-donning bilateral slipper socks) while sitting in recliner and using figure-4 technique with SBA and good dynamic sitting balance. Patient would benefit from OT services during hospitalization to increase independence and maximize safety with functional activities. Patient remains in recliner with call light/personal items within reach upon OT departure. Will continue to address goals and progress patient as able/tolerated.    Goals:     Short term goals:  Time Frame for Short Term Goals: 5 days (10/18/24)  Short Term Goal 1: Patient will complete a commode transfer while using DME PRN with CGA

## 2024-10-14 NOTE — PROGRESS NOTES
Comprehensive Nutrition Assessment    Type and Reason for Visit:  Initial (missing nutrition screening)    Nutrition Recommendations/Plan:   Glucerna tid meals.  Encourage oral fluids.     Malnutrition Assessment:  Malnutrition Status:  Moderate malnutrition (10/14/24 1120)    Context:  Chronic Illness     Findings of the 6 clinical characteristics of malnutrition:  Energy Intake:  75% or less estimated energy requirements for 1 month or longer  Weight Loss:  Greater than 10% over 6 months     Body Fat Loss:  No significant body fat loss     Muscle Mass Loss:  No significant muscle mass loss    Fluid Accumulation:  No significant fluid accumulation     Strength:  Not Performed    Nutrition Assessment:    Moderate (chronic) malnutrition r/t inadequate nutrient intakes, AEB weight losses >10% in  < 6 months with chronically lower PO per son. Weight losses 16.6% in 5 months per historical weights. She doesn't exhibit physical malnutrition indices as expected. Quite [place] upon visit. Controlled diabetes on Tradjenta with A1C 6.0. Discuss and will incorporate Glucerna tid to aid with oral fluids and adequate yue/protein. Noted vit D supplement. Renal indices elevated over values in past year with volume deficit.    Nutrition Related Findings:    no edema, no bowel sounds recorded Wound Type: None       Current Nutrition Intake & Therapies:    Average Meal Intake: 26-50%  Average Supplements Intake: None Ordered  ADULT DIET; Regular; 4 carb choices (60 gm/meal)  ADULT ORAL NUTRITION SUPPLEMENT; Breakfast, Lunch, Dinner; Diabetic Oral Supplement    Anthropometric Measures:  Height: 158.8 cm (5' 2.5\")  Ideal Body Weight (IBW): 113 lbs (51 kg)    Admission Body Weight: 72.6 kg (160 lb)  Current Body Weight: 61.3 kg (135 lb 1.6 oz), 119.6 % IBW. Weight Source: Bed Scale  Current BMI (kg/m2): 24.3  Usual Body Weight: 73.5 kg (162 lb) (< 6 months ago)  % Weight Change (Calculated): -16.6  Weight Adjustment For: No

## 2024-10-14 NOTE — PROGRESS NOTES
Patient anxious and restless.     Dr. Ardon notified.     Trazodone 25mg ordered nightly to help patient rest.

## 2024-10-15 LAB
ANION GAP SERPL CALCULATED.3IONS-SCNC: 14 MMOL/L (ref 9–17)
BASOPHILS # BLD: 0.01 K/UL (ref 0–0.2)
BASOPHILS NFR BLD: 0 % (ref 0–2)
BUN SERPL-MCNC: 47 MG/DL (ref 8–23)
BUN/CREAT SERPL: 20 (ref 9–20)
CALCIUM SERPL-MCNC: 9.2 MG/DL (ref 8.6–10.4)
CHLORIDE SERPL-SCNC: 105 MMOL/L (ref 98–107)
CO2 SERPL-SCNC: 15 MMOL/L (ref 20–31)
CREAT SERPL-MCNC: 2.3 MG/DL (ref 0.5–0.9)
EOSINOPHIL # BLD: 0.14 K/UL (ref 0–0.4)
EOSINOPHILS RELATIVE PERCENT: 1 % (ref 0–5)
ERYTHROCYTE [DISTWIDTH] IN BLOOD BY AUTOMATED COUNT: 16.1 % (ref 12.1–15.2)
GFR, ESTIMATED: 19 ML/MIN/1.73M2
GLUCOSE BLD-MCNC: 124 MG/DL (ref 65–99)
GLUCOSE BLD-MCNC: 130 MG/DL (ref 65–99)
GLUCOSE BLD-MCNC: 75 MG/DL (ref 65–99)
GLUCOSE BLD-MCNC: 90 MG/DL (ref 65–99)
GLUCOSE BLD-MCNC: 91 MG/DL (ref 65–99)
GLUCOSE SERPL-MCNC: 99 MG/DL (ref 70–99)
HCT VFR BLD AUTO: 22.7 % (ref 36–46)
HGB BLD-MCNC: 7.3 G/DL (ref 12–16)
IMM GRANULOCYTES # BLD AUTO: 0.03 K/UL (ref 0–0.3)
IMM GRANULOCYTES NFR BLD: 0 % (ref 0–5)
LYMPHOCYTES NFR BLD: 2.03 K/UL (ref 1–4.8)
LYMPHOCYTES RELATIVE PERCENT: 21 % (ref 15–40)
MCH RBC QN AUTO: 26.4 PG (ref 26–34)
MCHC RBC AUTO-ENTMCNC: 32.2 G/DL (ref 31–37)
MCV RBC AUTO: 82.2 FL (ref 80–100)
MONOCYTES NFR BLD: 0.4 K/UL (ref 0–1)
MONOCYTES NFR BLD: 4 % (ref 4–8)
NEUTROPHILS NFR BLD: 73 % (ref 47–75)
NEUTS SEG NFR BLD: 7.15 K/UL (ref 2.5–7)
PLATELET # BLD AUTO: 251 K/UL (ref 140–450)
PMV BLD AUTO: 9.6 FL (ref 6–12)
POTASSIUM SERPL-SCNC: 3.9 MMOL/L (ref 3.7–5.3)
RBC # BLD AUTO: 2.76 M/UL (ref 4–5.2)
SODIUM SERPL-SCNC: 134 MMOL/L (ref 135–144)
WBC OTHER # BLD: 9.8 K/UL (ref 3.5–11)

## 2024-10-15 PROCEDURE — 6370000000 HC RX 637 (ALT 250 FOR IP): Performed by: INTERNAL MEDICINE

## 2024-10-15 PROCEDURE — 94761 N-INVAS EAR/PLS OXIMETRY MLT: CPT

## 2024-10-15 PROCEDURE — 97530 THERAPEUTIC ACTIVITIES: CPT

## 2024-10-15 PROCEDURE — 80048 BASIC METABOLIC PNL TOTAL CA: CPT

## 2024-10-15 PROCEDURE — 2580000003 HC RX 258: Performed by: INTERNAL MEDICINE

## 2024-10-15 PROCEDURE — 1200000000 HC SEMI PRIVATE

## 2024-10-15 PROCEDURE — 36415 COLL VENOUS BLD VENIPUNCTURE: CPT

## 2024-10-15 PROCEDURE — 97110 THERAPEUTIC EXERCISES: CPT

## 2024-10-15 PROCEDURE — 85025 COMPLETE CBC W/AUTO DIFF WBC: CPT

## 2024-10-15 PROCEDURE — 6360000002 HC RX W HCPCS: Performed by: INTERNAL MEDICINE

## 2024-10-15 RX ORDER — SODIUM CHLORIDE 9 MG/ML
INJECTION, SOLUTION INTRAVENOUS CONTINUOUS
Status: DISCONTINUED | OUTPATIENT
Start: 2024-10-15 | End: 2024-10-17

## 2024-10-15 RX ORDER — SODIUM BICARBONATE 650 MG/1
650 TABLET ORAL 3 TIMES DAILY
Status: DISCONTINUED | OUTPATIENT
Start: 2024-10-15 | End: 2024-10-18 | Stop reason: HOSPADM

## 2024-10-15 RX ADMIN — ATORVASTATIN CALCIUM 20 MG: 20 TABLET, FILM COATED ORAL at 09:01

## 2024-10-15 RX ADMIN — PAROXETINE HYDROCHLORIDE 20 MG: 20 TABLET, FILM COATED ORAL at 09:01

## 2024-10-15 RX ADMIN — CHOLECALCIFEROL TAB 25 MCG (1000 UNIT) 2000 UNITS: 25 TAB at 09:01

## 2024-10-15 RX ADMIN — SODIUM CHLORIDE: 9 INJECTION, SOLUTION INTRAVENOUS at 17:13

## 2024-10-15 RX ADMIN — ASPIRIN 81 MG 81 MG: 81 TABLET ORAL at 21:39

## 2024-10-15 RX ADMIN — SODIUM CHLORIDE: 9 INJECTION, SOLUTION INTRAVENOUS at 05:57

## 2024-10-15 RX ADMIN — ASPIRIN 81 MG 81 MG: 81 TABLET ORAL at 09:01

## 2024-10-15 RX ADMIN — SODIUM CHLORIDE: 9 INJECTION, SOLUTION INTRAVENOUS at 04:55

## 2024-10-15 RX ADMIN — SODIUM BICARBONATE 650 MG: 650 TABLET ORAL at 21:39

## 2024-10-15 RX ADMIN — WATER 1000 MG: 1 INJECTION INTRAMUSCULAR; INTRAVENOUS; SUBCUTANEOUS at 17:25

## 2024-10-15 RX ADMIN — HEPARIN SODIUM 5000 UNITS: 5000 INJECTION INTRAVENOUS; SUBCUTANEOUS at 21:39

## 2024-10-15 RX ADMIN — SODIUM BICARBONATE 650 MG: 650 TABLET ORAL at 14:18

## 2024-10-15 RX ADMIN — PANTOPRAZOLE SODIUM 40 MG: 40 TABLET, DELAYED RELEASE ORAL at 09:01

## 2024-10-15 RX ADMIN — SODIUM BICARBONATE 650 MG: 650 TABLET ORAL at 09:01

## 2024-10-15 RX ADMIN — HEPARIN SODIUM 5000 UNITS: 5000 INJECTION INTRAVENOUS; SUBCUTANEOUS at 09:05

## 2024-10-15 ASSESSMENT — PAIN SCALES - GENERAL
PAINLEVEL_OUTOF10: 0
PAINLEVEL_OUTOF10: 0

## 2024-10-15 NOTE — PLAN OF CARE
Problem: Chronic Conditions and Co-morbidities  Goal: Patient's chronic conditions and co-morbidity symptoms are monitored and maintained or improved  10/14/2024 2300 by Astrid Benoit RN  Outcome: Progressing  10/14/2024 1039 by Hudson Eagle RN  Outcome: Progressing     Problem: Discharge Planning  Goal: Discharge to home or other facility with appropriate resources  10/14/2024 2300 by Astrid Benoit RN  Outcome: Progressing  10/14/2024 1039 by Hudson Eagle RN  Outcome: Progressing     Problem: Safety - Adult  Goal: Free from fall injury  10/14/2024 2300 by Astrid Benoit RN  Outcome: Progressing  10/14/2024 1039 by Hudson Eagle RN  Outcome: Progressing     Problem: ABCDS Injury Assessment  Goal: Absence of physical injury  10/14/2024 2300 by Astrid Benoit RN  Outcome: Progressing  10/14/2024 1039 by Hudson Eagle RN  Outcome: Progressing     Problem: Pain  Goal: Verbalizes/displays adequate comfort level or baseline comfort level  10/14/2024 2300 by Astrid Benoit RN  Outcome: Progressing  10/14/2024 1039 by Hudson Eagle RN  Outcome: Progressing     Problem: Confusion  Goal: Confusion, delirium, dementia, or psychosis is improved or at baseline  Description: INTERVENTIONS:  1. Assess for possible contributors to thought disturbance, including medications, impaired vision or hearing, underlying metabolic abnormalities, dehydration, psychiatric diagnoses, and notify attending LIP  2. [place] high risk fall precautions, as indicated  3. Provide frequent short contacts to provide reality reorientation, refocusing and direction  4. Decrease environmental stimuli, including noise as appropriate  5. Monitor and intervene to maintain adequate nutrition, hydration, elimination, sleep and activity  6. If unable to ensure safety without constant attention obtain sitter and review sitter guidelines with assigned personnel  7. Initiate Psychosocial CNS and Spiritual Care consult, as  71 y/o M with PMH of A-fib on Coumadin, HFrEF (LVEF 17% 2018) s/p AICD, HTN, gout, CKD III, BPH, Liver cirrhosis secondary to CHF p/w hypotension. no fever, or chills, no recent URTI. to note that the patient blood pressure is 70 to 80 at baseline due to his advanced heart failure  admitted to the ICU and started on levophed and vasopressin as a treatment for cardiogenic shock  he got started empirically on IV antibiotics w/o any evidence of infectious process    OVERNIGHT EVENTS: was more lethargic overnight, more somnolent. did not pass BM over the last 48 hrs    SUBJECTIVE / INTERVAL HPI: Patient seen and examined at bedside. not arousable,     VITAL SIGNS:  Vital Signs Last 24 Hrs  T(C): 36.4 (03 Sep 2019 12:00), Max: 37.4 (02 Sep 2019 16:15)  T(F): 97.6 (03 Sep 2019 12:00), Max: 99.3 (02 Sep 2019 16:15)  HR: 120 (03 Sep 2019 12:00) (98 - 122)  BP: 82/55 (03 Sep 2019 12:00) (57/46 - 102/65)  BP(mean): 69 (03 Sep 2019 12:00) (49 - 83)  RR: 29 (03 Sep 2019 12:00) (22 - 41)  SpO2: 94% (03 Sep 2019 12:00) (85% - 98%)    PHYSICAL EXAM:    General: WDWN  HEENT: NC/AT; PERRL, clear conjunctiva  Neck: supple  Cardiovascular: +S1/S2; RRR  Respiratory: CTA b/l; no W/R/R  Gastrointestinal: soft, NT/ND; +BSx4  Extremities: WWP; 2+ peripheral pulses; no edema   Neurological: AAOx3; no focal deficits    MEDICATIONS:  MEDICATIONS  (STANDING):  allopurinol 100 milliGRAM(s) Oral daily  cefepime   IVPB      cefepime   IVPB 2000 milliGRAM(s) IV Intermittent every 12 hours  chlorhexidine 4% Liquid 1 Application(s) Topical <User Schedule>  lactulose Syrup 20 Gram(s) Enteral Tube four times a day  metroNIDAZOLE    Tablet 500 milliGRAM(s) Oral every 8 hours  midodrine 10 milliGRAM(s) Oral every 8 hours  norepinephrine Infusion 0.05 MICROgram(s)/kG/Min (4.673 mL/Hr) IV Continuous <Continuous>  pantoprazole    Tablet 40 milliGRAM(s) Oral before breakfast  rifaximin 400 milliGRAM(s) Oral three times a day  sodium chloride 0.9% Bolus 500 milliLiter(s) IV Bolus once  sodium polystyrene sulfonate Suspension 30 Gram(s) Oral once  vasopressin Infusion 0.04 Unit(s)/Min (2.4 mL/Hr) IV Continuous <Continuous>    MEDICATIONS  (PRN):      ALLERGIES:  Allergies    No Known Allergies    Intolerances        LABS:                        15.1   8.80  )-----------( 63       ( 03 Sep 2019 04:50 )             44.0         131<L>  |  95<L>  |  56<H>  ----------------------------<  129<H>  6.2<HH>   |  19  |  3.2<H>    Ca    9.7      03 Sep 2019 10:50  Phos  6.2       Mg     3.1         TPro  6.3  /  Alb  3.0<L>  /  TBili  9.0<H>  /  DBili  6.4<H>  /  AST  97<H>  /  ALT  52<H>  /  AlkPhos  153<H>      PT/INR - ( 03 Sep 2019 04:50 )   PT: >40.00 sec;   INR: 4.59 ratio         PTT - ( 03 Sep 2019 04:50 )  PTT:92.1 sec  Urinalysis Basic - ( 01 Sep 2019 17:10 )    Color: Yellow / Appearance: Clear / S.025 / pH: x  Gluc: x / Ketone: Negative  / Bili: Negative / Urobili: <2 mg/dL   Blood: x / Protein: Trace / Nitrite: Negative   Leuk Esterase: Negative / RBC: x / WBC x   Sq Epi: x / Non Sq Epi: x / Bacteria: x      CAPILLARY BLOOD GLUCOSE      POCT Blood Glucose.: 73 mg/dL (01 Sep 2019 14:33)

## 2024-10-15 NOTE — PROGRESS NOTES
RN case manager and SW called to pt's dtr in law April and spoke with her regarding being unable to get in touch with [facility] by the phone number we had listed to discuss discharge plans. Reviewed discharge options with April including HH, rehab and private duty care. April called to [facility] and he and April's  Saravanan discussed. April called back to [facility] and states that they decided that pt should go to [facility] for rehab for a short time. SW made referral to [facility] and they will notify if they can accept. [facility] following. Syeda RAMEY LSW 10/15/2024

## 2024-10-15 NOTE — DISCHARGE INSTR - COC
Problems:  Patient Active Problem List   Diagnosis Code    Vitamin D deficiency E55.9    Osteoporosis, senile M81.0    Mixed hypercholesterolemia and hypertriglyceridemia E78.2    H/O malignant neoplasm of colon Z85.038    GERD (gastroesophageal reflux disease) K21.9    CAD (coronary artery disease) I25.10    Benign essential HTN I10    Osteoarthrosis M19.90    Controlled type 2 diabetes mellitus with stage 2 chronic kidney disease, without long-term current use of insulin ([facility]) E11.22, N18.2    Major depressive disorder with single episode, in full remission ([facility]) F32.5    Chronic kidney disease, stage IV (severe) ([facility]) N18.4    Dupuytren's contracture of right hand M72.0    Impaired cognitive ability R41.89    Sepsis secondary to UTI ([facility]) A41.9, N39.0    Moderate malnutrition ([facility]) E44.0       Isolation/Infection:   Isolation            No Isolation          Patient Infection Status       None to display                     Nurse Assessment:  Last Vital Signs: BP (!) 101/52   Pulse 75   Temp 98 °F (36.7 °C) (Oral)   Resp 16   Ht 1.588 m (5' 2.5\")   Wt 61.3 kg (135 lb 1.6 oz)   SpO2 93%   BMI 24.32 kg/m²     Last documented pain score (0-10 scale): Pain Level: 0  Last Weight:   Wt Readings from Last 1 Encounters:   10/13/24 61.3 kg (135 lb 1.6 oz)     Mental Status:  disoriented and alert    IV Access:  - None    Nursing Mobility/ADLs:  Walking   Assisted  Transfer  Assisted  Bathing  Assisted  Dressing  Assisted  Toileting  Assisted  Feeding  Assisted  Med Admin  Dependent  Med Delivery   whole    Wound Care Documentation and Therapy:        Elimination:  Continence:   Bowel: Yes  Bladder: Yes  Urinary Catheter: None   Colostomy/Ileostomy/Ileal Conduit: No       Date of Last BM: 10/18/2024    Intake/Output Summary (Last 24 hours) at 10/15/2024 1158  Last data filed at 10/15/2024 0600  Gross per 24 hour   Intake 1533.76 ml   Output --   Net 1533.76 ml     I/O last 3 completed shifts:  In: 2003.8 [P.O.:870;  Skilled Nursing Facility for less than 30 days.     Update Admission H&P: No change in H&P    PHYSICIAN SIGNATURE:  Electronically signed by Chris Guzman MD on 10/18/24 at 6:43 AM EDT

## 2024-10-15 NOTE — PLAN OF CARE
Problem: Chronic Conditions and Co-morbidities  Goal: Patient's chronic conditions and co-morbidity symptoms are monitored and maintained or improved  Outcome: Progressing  Flowsheets (Taken 10/15/2024 0855)  Care Plan - Patient's Chronic Conditions and Co-Morbidity Symptoms are Monitored and Maintained or Improved: Monitor and assess patient's chronic conditions and comorbid symptoms for stability, deterioration, or improvement     Problem: Discharge Planning  Goal: Discharge to home or other facility with appropriate resources  Outcome: Progressing  Flowsheets (Taken 10/15/2024 0855)  Discharge to home or other facility with appropriate resources:   Identify barriers to discharge with patient and caregiver   Arrange for needed discharge resources and transportation as appropriate   Identify discharge learning needs (meds, wound care, etc)   Refer to discharge planning if patient needs post-hospital services based on physician order or complex needs related to functional status, cognitive ability or social support system     Problem: Safety - Adult  Goal: Free from fall injury  Outcome: Progressing  Flowsheets (Taken 10/15/2024 0855)  Free From Fall Injury: Instruct family/caregiver on patient safety     Problem: ABCDS Injury Assessment  Goal: Absence of physical injury  Outcome: Progressing  Flowsheets (Taken 10/15/2024 0855)  Absence of Physical Injury: Implement safety measures based on patient assessment     Problem: Pain  Goal: Verbalizes/displays adequate comfort level or baseline comfort level  Outcome: Progressing     Problem: Confusion  Goal: Confusion, delirium, dementia, or psychosis is improved or at baseline  Description: INTERVENTIONS:  1. Assess for possible contributors to thought disturbance, including medications, impaired vision or hearing, underlying metabolic abnormalities, dehydration, psychiatric diagnoses, and notify attending LIP  2. [place] high risk fall precautions, as indicated  3.

## 2024-10-15 NOTE — PROGRESS NOTES
Phone: [phone] [facility]  Date: 10/15/2024  Fax: [phone]      Physical Therapy    Daily Note    Patient Name: Tawanna Antonio      : 10/20/1931  (89 y.o.)  MRN: [id number]     Pt is PROGRESSING toward goals and increased independence of mobility this treatment session     Assessment  Sit to Stand: Moderate Assistance  Stand to Sit: Moderate Assistance  Bed to Chair: Moderate assistance    Comment: ModA from bedside chair and toilet  VCs for hand placement    Ambulation  Surface: Level tile  Device: Rolling Walker  Assistance: Contact guard assistance  Gait Deviations: Slow Dea, Decreased step length  Distance: 15ft    Assessment: Patient is in bed watching TV upon entry. Patient is given R hearing aid and patient is able to place in R ear. Supine to sit is min A. Patient requests need to use bathroom, but doesn't think she can make it to bathroom in time. BSC is brought out. Sit to stand is mod A. Patient completes stand pivot with mod A to BSC. Sit to stand from BSC is min A. Patient has fair standing balance while pericare is completed. Patient uses FWW and CGA to ambulate around bed to recliner (x15') without LOB. While seated in recliner patient completes seated LE strengthening exercises per flowsheet. Following session patient remains seated in chair with call light in reacch and chair alarm in place.    Call light in reach, Phone in reach, Use of Gait belt, Chair alarm, and Left in chair  Time In: 1105  Time Out: 1135  Timed Coded Minutes: 30  Total Treatment Time: 30    Exercises:  See Flowsheets    Plan  Cont Per Plan Of Care    Goals  Short Term Goals  Time Frame for Short Term Goals: STG=LTG    Long Term Goals  Time Frame for Long Term Goals : 8 days (POC EXP on 10/21/24)  Long Term Goal 1: Pt to complete transfers sit to stand SBA to least restrictive AD.  Long Term Goal 2: Pt to amb 75ft with least restrictive AD SBA to improve west to household amb distance.  Long Term Goal 3:

## 2024-10-15 NOTE — PROGRESS NOTES
Phone: [phone] [facility]  Date: 10/15/2024  Fax: [phone]      Physical Therapy    Daily Note    Patient Name: Tawanna Antonio      : 10/20/1931  (89 y.o.)  MRN: [id number]     Pt shows NO CHANGE toward goals and increased independence of mobility this treatment session     Assessment  Sit to Stand: Moderate Assistance  Stand to Sit: Moderate Assistance  Bed to Chair: Moderate assistance    Comment: ModA from bedside chair and toilet  VCs for hand placement    Ambulation  Surface: Level tile  Device: Rolling Walker  Assistance: Contact guard assistance, Minimal assistance  Gait Deviations: Slow Dea, Decreased step length  Distance: 15ft  Comments: Pt amb from bathroom to bedside chair CGA with ww    Assessment: Patient seated in chair attempting to press call light upon entry. Patient states she needs to use the bathroom. Sit to stand from chair is min A. Patient ambulates with FWW and CGA into bathroom. Patient requires mod A to lower onto toilet. Educated patient to pull call light when finished. This therapist returns just after patient has flushed toilet. Sit to stand from commode is mod A. Patient requires assist for pericare after she tried on her own. New brief donned and then patient ambulates back to recliner with legs elevated. Call light in reach and chair alarm in place.    Call light in reach, Phone in reach, Use of Gait belt, Chair alarm, and Left in chair  Time In: 1500  Time Out: 1510  Timed Coded Minutes: 10  Total Treatment Time: 10    Exercises:  See Flowsheets    Plan  Cont Per Plan Of Care    Goals  Short Term Goals  Time Frame for Short Term Goals: STG=LTG    Long Term Goals  Time Frame for Long Term Goals : 8 days (POC EXP on 10/21/24)  Long Term Goal 1: Pt to complete transfers sit to stand SBA to least restrictive AD.  Long Term Goal 2: Pt to amb 75ft with least restrictive AD SBA to improve west to household amb distance.  Long Term Goal 3: Pt to complete supine to

## 2024-10-15 NOTE — PROGRESS NOTES
Hospitalist Progress Note  10/15/2024 6:24 AM  Subjective:   Admit Date: 10/13/2024  PCP: Bryson Ardon MD    Interval History:   Patient continues to remain confused.  She has been hypotensive and received fluid boluses.  Apparently IV fluids order  last night and she did not receive IV fluids overnight.  This morning blood pressure was low again 88/40 and IV fluids were resumed.  She had no nausea or vomiting.      Diet: ADULT ORAL NUTRITION SUPPLEMENT; Breakfast, Lunch, Dinner; Diabetic Oral Supplement  ADULT DIET; Regular  Medications:   Scheduled Meds:   sodium bicarbonate  650 mg Oral TID    aspirin  81 mg Oral BID    Vitamin D  2,000 Units Oral Daily    pantoprazole  40 mg Oral QAM AC    atorvastatin  20 mg Oral Daily    PARoxetine  20 mg Oral Daily    sodium chloride flush  5-40 mL IntraVENous 2 times per day    cefTRIAXone (ROCEPHIN) IV  1,000 mg IntraVENous Q24H    heparin (porcine)  5,000 Units SubCUTAneous BID    insulin lispro  0-4 Units SubCUTAneous 4x Daily AC & HS     Continuous Infusions:   sodium chloride 75 mL/hr at 10/15/24 0600    sodium chloride      dextrose       PRN Medications: sodium chloride flush, sodium chloride, ondansetron **OR** ondansetron, polyethylene glycol, acetaminophen **OR** acetaminophen, glucose, dextrose bolus **OR** dextrose bolus, glucagon (rDNA), dextrose    Objective:   Vitals: BP (!) 88/40   Pulse 75   Temp 97.7 °F (36.5 °C) (Oral)   Resp 16   Ht 1.588 m (5' 2.5\")   Wt 61.3 kg (135 lb 1.6 oz)   SpO2 94%   BMI 24.32 kg/m²   BMI: Body mass index is 24.32 kg/m².    CBC:   Recent Labs     10/13/24  1550 10/14/24  0605 10/15/24  0405   WBC 24.8* 12.0* 9.8   HGB 11.0* 8.4* 7.3*   * 315 251     BMP:    Recent Labs     10/13/24  1550 10/14/24  0605 10/15/24  0405   * 135 134*   K 3.9 3.8 3.9   CL 99 107 105   CO2 17* 16* 15*   BUN 52* 48* 47*   CREATININE 2.3* 2.2* 2.3*   GLUCOSE 138* 109* 99     Hepatic:   Recent Labs     10/13/24  1550   AST 8    ALT 6   BILITOT 0.4   ALKPHOS 135*       Physical Exam:  General Appearance: Confused, somnolent, in no acute distress  Cardiovascular: normal rate, regular rhythm, normal S1 and S2, no murmurs  Pulmonary/Chest: clear to auscultation bilaterally- no wheezes, rales or rhonchi,   Abdomen: soft, non-tender, non-distended, normal bowel sounds  Extremities: no cyanosis, clubbing or edema,  Skin: warm and dry, no rash   Head: normocephalic and atraumatic  Neurological: Confused, no focal findings or movement disorder noted           Assessment and Plan:       1.  Sepsis due to UTI -urine culture positive for Aerococcus urinae,  continue on IV Rocephin started on 10/13, blood cultures preliminary negative  2.  Acute on CKD stage IIIb - most likely secondary to dehydration, continue on IV fluids, monitor renal function, discontinue lisinopril and Dyazide  3.  Altered mental status due to sepsis  4.  Metabolic acidosis -will add bicarb  5.  Generalized weakness-will consult PT and OT for evaluation, most likely needs ECF placement  6.  Acute on chronic anemia secondary to chronic disease/CKD -H&H dropped most likely secondary to dilution from IV fluids and also blood draws.  She has no sources of bleeding.  Will continue monitor H&H  7.  History of diabetes mellitus type 2, non-insulin-dependent -placed on sliding scale insulin, last hemoglobin A1c was 5.8 on 5/3/2024.  May not resume Tradjenta on discharge to avoid hypoglycemia  8.  Moderate malnutrition -started on oral supplements  9.  GERD -on Protonix  10.  History of depression and anxiety -on Paxil        Differential Diagnosis: UTI, infectious process, viral illness, TIA versus CVA, electrolyte derangement     Condition is improving / unchanged / worsening: Improving     Condition is a treatment goal: No     Chronic condition is / is not having mild / moderate, severe exacerbation, progression or side effects of treatment:          Shared decision making:

## 2024-10-16 LAB
ANION GAP SERPL CALCULATED.3IONS-SCNC: 11 MMOL/L (ref 9–17)
BASOPHILS # BLD: 0 K/UL (ref 0–0.2)
BASOPHILS NFR BLD: 0 % (ref 0–2)
BUN SERPL-MCNC: 42 MG/DL (ref 8–23)
BUN/CREAT SERPL: 18 (ref 9–20)
CALCIUM SERPL-MCNC: 9 MG/DL (ref 8.6–10.4)
CHLORIDE SERPL-SCNC: 109 MMOL/L (ref 98–107)
CO2 SERPL-SCNC: 16 MMOL/L (ref 20–31)
CREAT SERPL-MCNC: 2.3 MG/DL (ref 0.5–0.9)
EOSINOPHIL # BLD: 0.14 K/UL (ref 0–0.4)
EOSINOPHILS RELATIVE PERCENT: 2 % (ref 0–5)
ERYTHROCYTE [DISTWIDTH] IN BLOOD BY AUTOMATED COUNT: 16 % (ref 12.1–15.2)
GFR, ESTIMATED: 19 ML/MIN/1.73M2
GLUCOSE BLD-MCNC: 404 MG/DL (ref 65–99)
GLUCOSE BLD-MCNC: 56 MG/DL (ref 65–99)
GLUCOSE BLD-MCNC: 67 MG/DL (ref 65–99)
GLUCOSE BLD-MCNC: 68 MG/DL (ref 65–99)
GLUCOSE BLD-MCNC: 79 MG/DL (ref 65–99)
GLUCOSE SERPL-MCNC: 97 MG/DL (ref 70–99)
HCT VFR BLD AUTO: 22.6 % (ref 36–46)
HGB BLD-MCNC: 7.3 G/DL (ref 12–16)
IMM GRANULOCYTES # BLD AUTO: 0.03 K/UL (ref 0–0.3)
IMM GRANULOCYTES NFR BLD: 0 % (ref 0–5)
LYMPHOCYTES NFR BLD: 1.91 K/UL (ref 1–4.8)
LYMPHOCYTES RELATIVE PERCENT: 21 % (ref 15–40)
MCH RBC QN AUTO: 26.7 PG (ref 26–34)
MCHC RBC AUTO-ENTMCNC: 32.3 G/DL (ref 31–37)
MCV RBC AUTO: 82.8 FL (ref 80–100)
MONOCYTES NFR BLD: 0.42 K/UL (ref 0–1)
MONOCYTES NFR BLD: 5 % (ref 4–8)
NEUTROPHILS NFR BLD: 73 % (ref 47–75)
NEUTS SEG NFR BLD: 6.6 K/UL (ref 2.5–7)
PLATELET # BLD AUTO: 272 K/UL (ref 140–450)
PMV BLD AUTO: 9.4 FL (ref 6–12)
POTASSIUM SERPL-SCNC: 3.6 MMOL/L (ref 3.7–5.3)
RBC # BLD AUTO: 2.73 M/UL (ref 4–5.2)
SODIUM SERPL-SCNC: 136 MMOL/L (ref 135–144)
WBC OTHER # BLD: 9.1 K/UL (ref 3.5–11)

## 2024-10-16 PROCEDURE — 6370000000 HC RX 637 (ALT 250 FOR IP): Performed by: INTERNAL MEDICINE

## 2024-10-16 PROCEDURE — 82947 ASSAY GLUCOSE BLOOD QUANT: CPT

## 2024-10-16 PROCEDURE — 36415 COLL VENOUS BLD VENIPUNCTURE: CPT

## 2024-10-16 PROCEDURE — 2580000003 HC RX 258: Performed by: INTERNAL MEDICINE

## 2024-10-16 PROCEDURE — 6360000002 HC RX W HCPCS: Performed by: INTERNAL MEDICINE

## 2024-10-16 PROCEDURE — 97110 THERAPEUTIC EXERCISES: CPT

## 2024-10-16 PROCEDURE — 85025 COMPLETE CBC W/AUTO DIFF WBC: CPT

## 2024-10-16 PROCEDURE — 1200000000 HC SEMI PRIVATE

## 2024-10-16 PROCEDURE — 94761 N-INVAS EAR/PLS OXIMETRY MLT: CPT

## 2024-10-16 PROCEDURE — 80048 BASIC METABOLIC PNL TOTAL CA: CPT

## 2024-10-16 RX ORDER — LACTOBACILLUS RHAMNOSUS GG 10B CELL
1 CAPSULE ORAL
Status: DISCONTINUED | OUTPATIENT
Start: 2024-10-17 | End: 2024-10-18 | Stop reason: HOSPADM

## 2024-10-16 RX ORDER — POTASSIUM CHLORIDE 750 MG/1
40 TABLET, EXTENDED RELEASE ORAL ONCE
Status: COMPLETED | OUTPATIENT
Start: 2024-10-16 | End: 2024-10-16

## 2024-10-16 RX ADMIN — ASPIRIN 81 MG 81 MG: 81 TABLET ORAL at 09:33

## 2024-10-16 RX ADMIN — ATORVASTATIN CALCIUM 20 MG: 20 TABLET, FILM COATED ORAL at 09:33

## 2024-10-16 RX ADMIN — SODIUM BICARBONATE 650 MG: 650 TABLET ORAL at 20:46

## 2024-10-16 RX ADMIN — PAROXETINE HYDROCHLORIDE 20 MG: 20 TABLET, FILM COATED ORAL at 09:33

## 2024-10-16 RX ADMIN — SODIUM CHLORIDE, PRESERVATIVE FREE 10 ML: 5 INJECTION INTRAVENOUS at 20:46

## 2024-10-16 RX ADMIN — SODIUM CHLORIDE, PRESERVATIVE FREE 10 ML: 5 INJECTION INTRAVENOUS at 09:35

## 2024-10-16 RX ADMIN — ASPIRIN 81 MG 81 MG: 81 TABLET ORAL at 20:46

## 2024-10-16 RX ADMIN — PANTOPRAZOLE SODIUM 40 MG: 40 TABLET, DELAYED RELEASE ORAL at 05:37

## 2024-10-16 RX ADMIN — CHOLECALCIFEROL TAB 25 MCG (1000 UNIT) 2000 UNITS: 25 TAB at 09:33

## 2024-10-16 RX ADMIN — HEPARIN SODIUM 5000 UNITS: 5000 INJECTION INTRAVENOUS; SUBCUTANEOUS at 20:46

## 2024-10-16 RX ADMIN — SODIUM BICARBONATE 650 MG: 650 TABLET ORAL at 09:35

## 2024-10-16 RX ADMIN — SODIUM BICARBONATE 650 MG: 650 TABLET ORAL at 13:38

## 2024-10-16 RX ADMIN — INSULIN LISPRO 4 UNITS: 100 INJECTION, SOLUTION INTRAVENOUS; SUBCUTANEOUS at 17:50

## 2024-10-16 RX ADMIN — HEPARIN SODIUM 5000 UNITS: 5000 INJECTION INTRAVENOUS; SUBCUTANEOUS at 09:33

## 2024-10-16 RX ADMIN — POTASSIUM CHLORIDE 40 MEQ: 750 TABLET, FILM COATED, EXTENDED RELEASE ORAL at 09:33

## 2024-10-16 RX ADMIN — SODIUM CHLORIDE: 9 INJECTION, SOLUTION INTRAVENOUS at 09:43

## 2024-10-16 RX ADMIN — Medication 16 G: at 15:57

## 2024-10-16 RX ADMIN — WATER 1000 MG: 1 INJECTION INTRAMUSCULAR; INTRAVENOUS; SUBCUTANEOUS at 17:01

## 2024-10-16 ASSESSMENT — PAIN SCALES - GENERAL: PAINLEVEL_OUTOF10: 0

## 2024-10-16 NOTE — PROGRESS NOTES
Comprehensive Nutrition Assessment    Type and Reason for Visit:  Reassess    Nutrition Recommendations/Plan:   Continue current diet.   Continue current ONS.      Malnutrition Assessment:  Malnutrition Status:  Moderate malnutrition (10/14/24 1120)    Context:  Chronic Illness     Findings of the 6 clinical characteristics of malnutrition:  Energy Intake:  75% or less estimated energy requirements for 1 month or longer  Weight Loss:  Greater than 10% over 6 months     Body Fat Loss:  No significant body fat loss     Muscle Mass Loss:  No significant muscle mass loss    Fluid Accumulation:  No significant fluid accumulation     Strength:  Not Performed    Nutrition Assessment:    Continued chronic moderate malnutrition aeb weight loss and poorer meal intakes before admission. Recommend probiotic due to ATB therapy.  Glucose levels range from . PO is improving. Pt reports PO a little better.  Pt encouraged to drink ONS and eat meals.    Nutrition Related Findings:    no edema, no bowel sounds recorded Wound Type: None       Current Nutrition Intake & Therapies:    Average Meal Intake: 51-75%  Average Supplements Intake: None Ordered  ADULT ORAL NUTRITION SUPPLEMENT; Breakfast, Lunch, Dinner; Diabetic Oral Supplement  ADULT DIET; Regular    Anthropometric Measures:  Height: 158.8 cm (5' 2.5\")  Ideal Body Weight (IBW): 113 lbs (51 kg)    Admission Body Weight: 72.6 kg (160 lb)  Current Body Weight: 61.3 kg (135 lb 2.3 oz), 119.6 % IBW. Weight Source: Bed Scale  Current BMI (kg/m2): 24.3  Usual Body Weight: 73.5 kg (162 lb) (< 6 months ago)  % Weight Change (Calculated): -16.6  Weight Adjustment For: No Adjustment                 BMI Categories: Normal Weight (BMI 18.5-24.9)    Estimated Daily Nutrient Needs:  Energy Requirements Based On: Kcal/kg  Weight Used for Energy Requirements: Current  Energy (kcal/day): 9496-0389 (22-27)  Weight Used for Protein Requirements: Current  Protein (g/day): 73-80

## 2024-10-16 NOTE — PROGRESS NOTES
Spiritual Services Interventions  0258/0258-01   10/16/2024  Fuad Antonio  89 y.o. year old female    Encounter Summary  Encounter Overview/Reason: (P) Spiritual/Emotional Needs  Service Provided For: (P) Patient  Referral/Consult From: (P) Rounding  Complexity of Encounter: (P) Moderate  Begin Time: (P) 0830  End Time : (P) 0840  Total Time Calculated: (P) 10 min     Spiritual/Emotional needs  Type: (P) Spiritual Support                    Assessment/Intervention/Outcome  Assessment: (P) Calm  Intervention: (P) Discussed belief system/Buddhist practices/derek, Discussed illness injury and it’s impact  Outcome: (P) Encouraged, Engaged in conversation

## 2024-10-16 NOTE — PLAN OF CARE
Problem: Chronic Conditions and Co-morbidities  Goal: Patient's chronic conditions and co-morbidity symptoms are monitored and maintained or improved  10/16/2024 1236 by Darin Peña RN  Outcome: Progressing  Flowsheets (Taken 10/16/2024 0800)  Care Plan - Patient's Chronic Conditions and Co-Morbidity Symptoms are Monitored and Maintained or Improved:   Monitor and assess patient's chronic conditions and comorbid symptoms for stability, deterioration, or improvement   Collaborate with multidisciplinary team to address chronic and comorbid conditions and prevent exacerbation or deterioration   Update acute care plan with appropriate goals if chronic or comorbid symptoms are exacerbated and prevent overall improvement and discharge  10/16/2024 0527 by Montana Zacarias RN  Outcome: Progressing  Flowsheets (Taken 10/15/2024 2000)  Care Plan - Patient's Chronic Conditions and Co-Morbidity Symptoms are Monitored and Maintained or Improved: Monitor and assess patient's chronic conditions and comorbid symptoms for stability, deterioration, or improvement     Problem: Discharge Planning  Goal: Discharge to home or other facility with appropriate resources  10/16/2024 1236 by Darin Peña RN  Outcome: Progressing  Flowsheets (Taken 10/16/2024 0800)  Discharge to home or other facility with appropriate resources:   Identify barriers to discharge with patient and caregiver   Arrange for needed discharge resources and transportation as appropriate   Identify discharge learning needs (meds, wound care, etc)  10/16/2024 0527 by Montana Zacarias RN  Outcome: Progressing  Flowsheets (Taken 10/15/2024 2000)  Discharge to home or other facility with appropriate resources:   Identify barriers to discharge with patient and caregiver   Arrange for needed discharge resources and transportation as appropriate   Identify discharge learning needs (meds, wound care, etc)   Refer to discharge planning if patient needs post-hospital

## 2024-10-16 NOTE — PROGRESS NOTES
"Nutrition Support Assessment:  Day 2 of admit.  Michael Ontiveros is a 31 y.o. male with admitting DX of Volume overload, Hypercapnic respiratory failure (HCC)  Current problem list:  1. Intubated this morning after failing BiPAP.  2. H/o cellulitis requiring diuresis, chronic anticoagulation, morbid obesity.     Assessment:  Estimated Nutritional Needs based on:   Height: 172.7 cm (5' 8\")  Weight: (!) 199 kg (439 lb 13.1 oz)  Ideal Body Weight: 69.9 kg (154 lb)  Percent Ideal Body Weight: 285.6  Body mass index is 66.87 kg/m²., BMI classification: Class III obesity    Calculation/Equation: REE per MSJ = 2928 kcal/day; RMR per PSU (vent L/min 13.9, T max/24 hours 36.8) = 3172 kcal/day (65-70% = 0528-9096 kcal/day)  Total Calories / day: 1538 - 1748 (Calories / kg IBW: 22 - 25)  Total Grams Protein / day: 160 - 199 (Grams Protein / k.8 - 1.0) (2.5 grams protein/kg IBW = 175 grams/day)     Evaluation:   1. Pt is intubated and unable to take PO diet.  2. Estimated needs are based on ASPEN/SCCM guidelines for obesity.  3. Of note, weight down 7.1 kg since admit, although 2 different scale types have been used. Pt reported being up 6 lb on admit.  4. Reviewed labs and meds.   5. Low-calorie, high-protein TF formula is indicated.    Malnutrition Risk: None identified @ this time.     Recommendations/Plan:  1. Peptamen Intense VHP @ goal rate 80 ml/hr to provide 1920 kcal, 177 grams protein, and 1613 ml free water per day.  2. Fluids per MD.    RD following.   " [facility] called to [facility] and they will accept pt and have a bed available on Thursday for pt. LOLITA following. Syeda Gresham MSW LSW 10/16/2024

## 2024-10-16 NOTE — PLAN OF CARE
Problem: Chronic Conditions and Co-morbidities  Goal: Patient's chronic conditions and co-morbidity symptoms are monitored and maintained or improved  10/16/2024 0527 by Montana Zacarias RN  Outcome: Progressing  Flowsheets (Taken 10/15/2024 2000)  Care Plan - Patient's Chronic Conditions and Co-Morbidity Symptoms are Monitored and Maintained or Improved: Monitor and assess patient's chronic conditions and comorbid symptoms for stability, deterioration, or improvement  10/15/2024 1613 by Bing Messer, RN  Outcome: Progressing  Flowsheets (Taken 10/15/2024 0855)  Care Plan - Patient's Chronic Conditions and Co-Morbidity Symptoms are Monitored and Maintained or Improved: Monitor and assess patient's chronic conditions and comorbid symptoms for stability, deterioration, or improvement     Problem: Discharge Planning  Goal: Discharge to home or other facility with appropriate resources  10/16/2024 0527 by Montana Zacarias RN  Outcome: Progressing  Flowsheets (Taken 10/15/2024 2000)  Discharge to home or other facility with appropriate resources:   Identify barriers to discharge with patient and caregiver   Arrange for needed discharge resources and transportation as appropriate   Identify discharge learning needs (meds, wound care, etc)   Refer to discharge planning if patient needs post-hospital services based on physician order or complex needs related to functional status, cognitive ability or social support system  10/15/2024 1613 by Bing Messer, RN  Outcome: Progressing  Flowsheets (Taken 10/15/2024 0855)  Discharge to home or other facility with appropriate resources:   Identify barriers to discharge with patient and caregiver   Arrange for needed discharge resources and transportation as appropriate   Identify discharge learning needs (meds, wound care, etc)   Refer to discharge planning if patient needs post-hospital services based on physician order or complex needs related to functional status,

## 2024-10-16 NOTE — PROGRESS NOTES
Phone: [phone] [facility]  Date: 10/16/2024  Fax: [phone]      Physical Therapy    Daily Note    Patient Name: Tawanna Antonio      : 10/20/1931  (89 y.o.)  MRN: [id number]     Pt is PROGRESSING toward goals and increased independence of mobility this treatment session        Assessment    Supine to Sit: Contact guard assistance  Sit to Stand: Minimal Assistance  Stand to Sit: Minimal Assistance  Bed to Chair: Minimal assistance      WB Status:  (Gt with FWW FWB x8ft with CGA-Balwinder no LOB.)  Ambulation  Surface: Level tile  Device: Rolling Walker  Assistance: Minimal assistance  Quality of Gait: Initally pt amb with scissoring gait due to not wanting to \"have an accident\" requiring min A for balance after multiple Max cues pt amb with typical step pattern.  Gait Deviations: Slow Dea, Decreased step length  Distance: 8ft  Comments: Pt amb from bathroom to bedside chair CGA with ww    Assessment: Patient supine in bed upon entry. Supine to sit EOB with minAx1. STS from EOB minAx1. Gt in room x8ft using FWW minAx1 to chair. BLE seated ex's per flowsheet. pt left reclined in chair with call light in reach, nurse in room and chair alarm on.     Call light in reach, Phone in reach, Use of Gait belt, Chair alarm, Nurse Notified, and Left in chair  Time In: 1313  Time Out: 1338   Timed Coded Minutes: 25  Total Treatment Time: 25    Exercises:  See Flowsheets    Plan  Cont Per Plan Of Care    Goals  Short Term Goals  Time Frame for Short Term Goals: STG=LTG    Long Term Goals  Time Frame for Long Term Goals : 8 days (POC EXP on 10/21/24)  Long Term Goal 1: Pt to complete transfers sit to stand SBA to least restrictive AD.  Long Term Goal 2: Pt to amb 75ft with least restrictive AD SBA to improve west to household amb distance.  Long Term Goal 3: Pt to complete supine to sit Mary Alice with use of bedrail to improve independence of bed mobility.          Valente Turk, PTA      Date: 10/16/2024

## 2024-10-16 NOTE — PROGRESS NOTES
Phone: [phone] [facility]  Date: 10/16/2024  Fax: [phone]      Physical Therapy    Daily Note    Patient Name: Tawanna Antonio      : 10/20/1931  (89 y.o.)  MRN: [id number]     Pt shows NO CHANGE toward goals and increased independence of mobility this treatment session        Assessment    Assessment: Patient supine in bed upon entry. Encouragment from this PTA given for transfers and ambulation but pt refuses at this time. She is agreeable to BLE ex's in bed. Pt instructed in strengthening ex's per flowsheet. Repositioning in bed with pt using bed rails to scoot herself up. She is left supine in the bed with call light in reach and bed alarm on.    Call light in reach, Bed alarm, and Left in bed  Time In: 1037  Time Out: 1102  Timed Coded Minutes: 25  Total Treatment Time: 25    Exercises:  See Flowsheets    Plan  Cont Per Plan Of Care    Goals  Short Term Goals  Time Frame for Short Term Goals: STG=LTG    Long Term Goals  Time Frame for Long Term Goals : 8 days (POC EXP on 10/21/24)  Long Term Goal 1: Pt to complete transfers sit to stand SBA to least restrictive AD.  Long Term Goal 2: Pt to amb 75ft with least restrictive AD SBA to improve west to household amb distance.  Long Term Goal 3: Pt to complete supine to sit Mary Alice with use of bedrail to improve independence of bed mobility.          Valente Turk, PTA      Date: 10/16/2024

## 2024-10-17 ENCOUNTER — APPOINTMENT (OUTPATIENT)
Dept: ULTRASOUND IMAGING | Age: 89
DRG: 872 | End: 2024-10-17
Payer: MEDICARE

## 2024-10-17 LAB
ABO/RH: NORMAL
ANION GAP SERPL CALCULATED.3IONS-SCNC: 11 MMOL/L (ref 9–17)
ANTIBODY SCREEN: NEGATIVE
ARM BAND NUMBER: NORMAL
ARM BAND NUMBER: NORMAL
BASOPHILS # BLD: 0.01 K/UL (ref 0–0.2)
BASOPHILS NFR BLD: 0 % (ref 0–2)
BLOOD BANK BLOOD PRODUCT EXPIRATION DATE: NORMAL
BLOOD BANK DISPENSE STATUS: NORMAL
BLOOD BANK ISBT PRODUCT BLOOD TYPE: 9500
BLOOD BANK SAMPLE EXPIRATION: NORMAL
BLOOD BANK UNIT TYPE AND RH: NORMAL
BPU ID: NORMAL
BUN SERPL-MCNC: 38 MG/DL (ref 8–23)
BUN/CREAT SERPL: 16 (ref 9–20)
CALCIUM SERPL-MCNC: 9.1 MG/DL (ref 8.6–10.4)
CHLORIDE SERPL-SCNC: 112 MMOL/L (ref 98–107)
CO2 SERPL-SCNC: 15 MMOL/L (ref 20–31)
COMPONENT: NORMAL
CREAT SERPL-MCNC: 2.4 MG/DL (ref 0.5–0.9)
CROSSMATCH RESULT: NORMAL
EOSINOPHIL # BLD: 0.17 K/UL (ref 0–0.4)
EOSINOPHILS RELATIVE PERCENT: 2 % (ref 0–5)
ERYTHROCYTE [DISTWIDTH] IN BLOOD BY AUTOMATED COUNT: 16.2 % (ref 12.1–15.2)
GFR, ESTIMATED: 18 ML/MIN/1.73M2
GLUCOSE BLD-MCNC: 144 MG/DL (ref 65–99)
GLUCOSE BLD-MCNC: 70 MG/DL (ref 65–99)
GLUCOSE BLD-MCNC: 76 MG/DL (ref 65–99)
GLUCOSE BLD-MCNC: 89 MG/DL (ref 65–99)
GLUCOSE SERPL-MCNC: 106 MG/DL (ref 70–99)
HCT VFR BLD AUTO: 21.8 % (ref 36–46)
HCT VFR BLD AUTO: 26.1 % (ref 36–46)
HGB BLD-MCNC: 7 G/DL (ref 12–16)
HGB BLD-MCNC: 8.5 G/DL (ref 12–16)
IMM GRANULOCYTES # BLD AUTO: 0.03 K/UL (ref 0–0.3)
IMM GRANULOCYTES NFR BLD: 0 % (ref 0–5)
LYMPHOCYTES NFR BLD: 1.64 K/UL (ref 1–4.8)
LYMPHOCYTES RELATIVE PERCENT: 21 % (ref 15–40)
MCH RBC QN AUTO: 26.8 PG (ref 26–34)
MCHC RBC AUTO-ENTMCNC: 32.1 G/DL (ref 31–37)
MCV RBC AUTO: 83.5 FL (ref 80–100)
MONOCYTES NFR BLD: 0.45 K/UL (ref 0–1)
MONOCYTES NFR BLD: 6 % (ref 4–8)
NEUTROPHILS NFR BLD: 70 % (ref 47–75)
NEUTS SEG NFR BLD: 5.45 K/UL (ref 2.5–7)
PLATELET # BLD AUTO: 243 K/UL (ref 140–450)
PMV BLD AUTO: 9.6 FL (ref 6–12)
POTASSIUM SERPL-SCNC: 4.1 MMOL/L (ref 3.7–5.3)
RBC # BLD AUTO: 2.61 M/UL (ref 4–5.2)
SODIUM SERPL-SCNC: 138 MMOL/L (ref 135–144)
TRANSFUSION STATUS: NORMAL
UNIT DIVISION: 0
UNIT ISSUE DATE/TIME: NORMAL
WBC OTHER # BLD: 7.8 K/UL (ref 3.5–11)

## 2024-10-17 PROCEDURE — 36430 TRANSFUSION BLD/BLD COMPNT: CPT

## 2024-10-17 PROCEDURE — 30233N1 TRANSFUSION OF NONAUTOLOGOUS RED BLOOD CELLS INTO PERIPHERAL VEIN, PERCUTANEOUS APPROACH: ICD-10-PCS | Performed by: INTERNAL MEDICINE

## 2024-10-17 PROCEDURE — 6370000000 HC RX 637 (ALT 250 FOR IP): Performed by: INTERNAL MEDICINE

## 2024-10-17 PROCEDURE — 82947 ASSAY GLUCOSE BLOOD QUANT: CPT

## 2024-10-17 PROCEDURE — 85025 COMPLETE CBC W/AUTO DIFF WBC: CPT

## 2024-10-17 PROCEDURE — 76775 US EXAM ABDO BACK WALL LIM: CPT

## 2024-10-17 PROCEDURE — 94761 N-INVAS EAR/PLS OXIMETRY MLT: CPT

## 2024-10-17 PROCEDURE — 6360000002 HC RX W HCPCS: Performed by: INTERNAL MEDICINE

## 2024-10-17 PROCEDURE — 36415 COLL VENOUS BLD VENIPUNCTURE: CPT

## 2024-10-17 PROCEDURE — P9016 RBC LEUKOCYTES REDUCED: HCPCS

## 2024-10-17 PROCEDURE — 80048 BASIC METABOLIC PNL TOTAL CA: CPT

## 2024-10-17 PROCEDURE — 76770 US EXAM ABDO BACK WALL COMP: CPT

## 2024-10-17 PROCEDURE — 86920 COMPATIBILITY TEST SPIN: CPT

## 2024-10-17 PROCEDURE — 86900 BLOOD TYPING SEROLOGIC ABO: CPT

## 2024-10-17 PROCEDURE — 86901 BLOOD TYPING SEROLOGIC RH(D): CPT

## 2024-10-17 PROCEDURE — 1200000000 HC SEMI PRIVATE

## 2024-10-17 PROCEDURE — 2580000003 HC RX 258: Performed by: INTERNAL MEDICINE

## 2024-10-17 PROCEDURE — 85014 HEMATOCRIT: CPT

## 2024-10-17 PROCEDURE — 86850 RBC ANTIBODY SCREEN: CPT

## 2024-10-17 PROCEDURE — 97110 THERAPEUTIC EXERCISES: CPT

## 2024-10-17 PROCEDURE — 85018 HEMOGLOBIN: CPT

## 2024-10-17 RX ORDER — SODIUM CHLORIDE 9 MG/ML
INJECTION, SOLUTION INTRAVENOUS PRN
Status: DISCONTINUED | OUTPATIENT
Start: 2024-10-17 | End: 2024-10-18 | Stop reason: HOSPADM

## 2024-10-17 RX ADMIN — CHOLECALCIFEROL TAB 25 MCG (1000 UNIT) 2000 UNITS: 25 TAB at 08:14

## 2024-10-17 RX ADMIN — SODIUM BICARBONATE 650 MG: 650 TABLET ORAL at 08:14

## 2024-10-17 RX ADMIN — WATER 1000 MG: 1 INJECTION INTRAMUSCULAR; INTRAVENOUS; SUBCUTANEOUS at 16:30

## 2024-10-17 RX ADMIN — SODIUM CHLORIDE, PRESERVATIVE FREE 10 ML: 5 INJECTION INTRAVENOUS at 20:03

## 2024-10-17 RX ADMIN — Medication 1 CAPSULE: at 08:13

## 2024-10-17 RX ADMIN — ASPIRIN 81 MG 81 MG: 81 TABLET ORAL at 08:14

## 2024-10-17 RX ADMIN — ASPIRIN 81 MG 81 MG: 81 TABLET ORAL at 20:03

## 2024-10-17 RX ADMIN — ATORVASTATIN CALCIUM 20 MG: 20 TABLET, FILM COATED ORAL at 08:14

## 2024-10-17 RX ADMIN — SODIUM BICARBONATE 650 MG: 650 TABLET ORAL at 20:03

## 2024-10-17 RX ADMIN — SODIUM BICARBONATE 650 MG: 650 TABLET ORAL at 14:28

## 2024-10-17 RX ADMIN — PANTOPRAZOLE SODIUM 40 MG: 40 TABLET, DELAYED RELEASE ORAL at 06:15

## 2024-10-17 RX ADMIN — PAROXETINE HYDROCHLORIDE 20 MG: 20 TABLET, FILM COATED ORAL at 08:14

## 2024-10-17 NOTE — PROGRESS NOTES
Hospitalist Progress Note  10/17/2024 6:38 AM  Subjective:   Admit Date: 10/13/2024  PCP: Bryson Ardon MD    Interval History:     Patient is awake this morning.  She has no complaints.  Night was uneventful.  Hemodynamically stable.  She has noted to have hemoglobin of 7.0 this morning and worsening renal function.    Diet: ADULT ORAL NUTRITION SUPPLEMENT; Breakfast, Lunch, Dinner; Diabetic Oral Supplement  ADULT DIET; Regular  Medications:   Scheduled Meds:   lactobacillus  1 capsule Oral Daily with breakfast    sodium bicarbonate  650 mg Oral TID    aspirin  81 mg Oral BID    Vitamin D  2,000 Units Oral Daily    pantoprazole  40 mg Oral QAM AC    atorvastatin  20 mg Oral Daily    PARoxetine  20 mg Oral Daily    sodium chloride flush  5-40 mL IntraVENous 2 times per day    cefTRIAXone (ROCEPHIN) IV  1,000 mg IntraVENous Q24H    heparin (porcine)  5,000 Units SubCUTAneous BID    insulin lispro  0-4 Units SubCUTAneous 4x Daily AC & HS     Continuous Infusions:   sodium chloride      sodium chloride Stopped (10/17/24 0559)    sodium chloride      dextrose       PRN Medications: sodium chloride, sodium chloride flush, sodium chloride, ondansetron **OR** ondansetron, polyethylene glycol, acetaminophen **OR** acetaminophen, glucose, dextrose bolus **OR** dextrose bolus, glucagon (rDNA), dextrose    Objective:   Vitals: BP (!) 118/56   Pulse 73   Temp 97.9 °F (36.6 °C) (Oral)   Resp 18   Ht 1.588 m (5' 2.5\")   Wt 61.5 kg (135 lb 9.3 oz)   SpO2 97%   BMI 24.40 kg/m²   BMI: Body mass index is 24.4 kg/m².    CBC:   Recent Labs     10/15/24  0405 10/16/24  0425 10/17/24  0415   WBC 9.8 9.1 7.8   HGB 7.3* 7.3* 7.0*    272 243     BMP:    Recent Labs     10/15/24  0405 10/16/24  0425 10/17/24  0415   * 136 138   K 3.9 3.6* 4.1    109* 112*   CO2 15* 16* 15*   BUN 47* 42* 38*   CREATININE 2.3* 2.3* 2.4*   GLUCOSE 99 97 106*     Physical Exam:    General Appearance: Awake, confused, follows basic  commands, in no acute distress  Cardiovascular: normal rate, regular rhythm, normal S1 and S2, no murmurs  Pulmonary/Chest: clear to auscultation bilaterally- no wheezes, rales or rhonchi,   Abdomen: soft, non-tender, non-distended, normal bowel sounds  Extremities: no cyanosis, clubbing or edema,  Skin: warm and dry, no rash   Head: normocephalic and atraumatic  Neurological: Confused, no focal findings or movement disorder noted        Assessment and Plan:         1.  Sepsis due to UTI - urine culture positive for Aerococcus urinae,  on IV Rocephin started on 10/13, blood cultures preliminary negative.  Will change IV Rocephin to oral Keflex.  2.  Acute on CKD stage IIIb -worsening despite IV fluids, will obtain renal ultrasound today for evaluation.  Discontinued lisinopril and Dyazide  3.  Altered mental status due to sepsis-improved  4.  Metabolic acidosis -started on p.o. bicarb  5.  Hypokalemia -replace  6.  Generalized weakness  -evaluated by PT and OT , plan is to discharge to ECF when medically stable  7.  Acute on chronic anemia secondary to chronic disease/CKD -H&H dropped most likely secondary to dilution from IV fluids and also blood draws.  She has no sources of bleeding.  Will transfuse 1 unit of PRBC due to hemoglobin of 7.0.  8.  History of diabetes mellitus type 2, non-insulin-dependent -placed on sliding scale insulin, last hemoglobin A1c was 5.8 on 5/3/2024.  May not resume Tradjenta on discharge to avoid hypoglycemia  9.  Moderate malnutrition -started on oral supplements  10.  GERD -on Protonix  11.  History of depression and anxiety -on Paxil        Differential Diagnosis: UTI, infectious process, viral illness, TIA versus CVA, electrolyte derangement     Condition is improving / unchanged / worsening: Improving     Condition is a treatment goal: No     Chronic condition is / is not having mild / moderate, severe exacerbation, progression or side effects of treatment:          Shared decision

## 2024-10-17 NOTE — PROGRESS NOTES
Phone: [phone] [facility]  Date: 10/17/2024  Fax: [phone]      Physical Therapy    Daily Note    Patient Name: Tawanna Antonio      : 10/20/1931  (89 y.o.)  MRN: [id number]     Pt is PROGRESSING toward goals and increased independence of mobility this treatment session        Assessment                  Sit to Stand: Minimal Assistance  Stand to Sit: Minimal Assistance                  Assessment: Patient  seated up in chair upon arrival to room. She agrees to work with PTA.  Completes seated and standing exercises as outlined above but declines to ambulate at this time.  She remains up in chair following with call light in reach and chair alarm attached.  Safety Devices  Type of Devices: Call light within reach, Chair alarm in place, Gait belt, Left in chair, Nurse notified          Call light in reach, Phone in reach, Use of Gait belt, Chair alarm, Nurse Notified, and Left in chair  Time In: 1354  Time Out: 1428  Timed Coded Minutes: 34  Total Treatment Time: 34      Exercises:  See Flowsheets    Plan  Cont Per Plan Of Care    Goals  Short Term Goals  Time Frame for Short Term Goals: STG=LTG                   Long Term Goals  Time Frame for Long Term Goals : 8 days (POC EXP on 10/21/24)  Long Term Goal 1: Pt to complete transfers sit to stand SBA to least restrictive AD.  Long Term Goal 2: Pt to amb 75ft with least restrictive AD SBA to improve west to household amb distance.  Long Term Goal 3: Pt to complete supine to sit Mary Alice with use of bedrail to improve independence of bed mobility.          Chante Ferraro Therapy License Number: [id number]    Date: 10/17/2024

## 2024-10-17 NOTE — CONSENT
Informed Consent for Blood Component Transfusion Note    I have discussed with the  patient  the rationale for blood component transfusion; its benefits in treating or preventing fatigue, organ damage, or death; and its risk which includes mild transfusion reactions, rare risk of blood borne infection, or more serious but rare reactions. I have discussed the alternatives to transfusion, including the risk and consequences of not receiving transfusion. The patient had an opportunity to ask questions and had agreed to proceed with transfusion of blood components.    Electronically signed by Chris Guzman MD on 10/17/24 at 6:33 AM EDT

## 2024-10-17 NOTE — PLAN OF CARE
Problem: Chronic Conditions and Co-morbidities  Goal: Patient's chronic conditions and co-morbidity symptoms are monitored and maintained or improved  Outcome: Progressing  Flowsheets (Taken 10/16/2024 2000)  Care Plan - Patient's Chronic Conditions and Co-Morbidity Symptoms are Monitored and Maintained or Improved: Collaborate with multidisciplinary team to address chronic and comorbid conditions and prevent exacerbation or deterioration     Problem: Discharge Planning  Goal: Discharge to home or other facility with appropriate resources  Outcome: Progressing  Flowsheets (Taken 10/16/2024 2000)  Discharge to home or other facility with appropriate resources: Identify barriers to discharge with patient and caregiver     Problem: Safety - Adult  Goal: Free from fall injury  Outcome: Progressing     Problem: ABCDS Injury Assessment  Goal: Absence of physical injury  Outcome: Progressing     Problem: Pain  Goal: Verbalizes/displays adequate comfort level or baseline comfort level  Outcome: Progressing  Flowsheets (Taken 10/16/2024 1957)  Verbalizes/displays adequate comfort level or baseline comfort level: Encourage patient to monitor pain and request assistance     Problem: Confusion  Goal: Confusion, delirium, dementia, or psychosis is improved or at baseline  Description: INTERVENTIONS:  1. Assess for possible contributors to thought disturbance, including medications, impaired vision or hearing, underlying metabolic abnormalities, dehydration, psychiatric diagnoses, and notify attending LIP  2. [place] high risk fall precautions, as indicated  3. Provide frequent short contacts to provide reality reorientation, refocusing and direction  4. Decrease environmental stimuli, including noise as appropriate  5. Monitor and intervene to maintain adequate nutrition, hydration, elimination, sleep and activity  6. If unable to ensure safety without constant attention obtain sitter and review sitter guidelines with

## 2024-10-18 VITALS
BODY MASS INDEX: 24.02 KG/M2 | OXYGEN SATURATION: 98 % | HEART RATE: 72 BPM | DIASTOLIC BLOOD PRESSURE: 65 MMHG | WEIGHT: 135.58 LBS | RESPIRATION RATE: 16 BRPM | HEIGHT: 63 IN | TEMPERATURE: 98.8 F | SYSTOLIC BLOOD PRESSURE: 116 MMHG

## 2024-10-18 LAB
ANION GAP SERPL CALCULATED.3IONS-SCNC: 10 MMOL/L (ref 9–17)
BASOPHILS # BLD: 0.01 K/UL (ref 0–0.2)
BASOPHILS NFR BLD: 0 % (ref 0–2)
BUN SERPL-MCNC: 35 MG/DL (ref 8–23)
BUN/CREAT SERPL: 15 (ref 9–20)
CALCIUM SERPL-MCNC: 9.3 MG/DL (ref 8.6–10.4)
CHLORIDE SERPL-SCNC: 108 MMOL/L (ref 98–107)
CO2 SERPL-SCNC: 18 MMOL/L (ref 20–31)
CREAT SERPL-MCNC: 2.3 MG/DL (ref 0.5–0.9)
EOSINOPHIL # BLD: 0.18 K/UL (ref 0–0.4)
EOSINOPHILS RELATIVE PERCENT: 2 % (ref 0–5)
ERYTHROCYTE [DISTWIDTH] IN BLOOD BY AUTOMATED COUNT: 16.3 % (ref 12.1–15.2)
GFR, ESTIMATED: 19 ML/MIN/1.73M2
GLUCOSE BLD-MCNC: 88 MG/DL (ref 65–99)
GLUCOSE SERPL-MCNC: 95 MG/DL (ref 70–99)
HCT VFR BLD AUTO: 26.4 % (ref 36–46)
HGB BLD-MCNC: 8.7 G/DL (ref 12–16)
IMM GRANULOCYTES # BLD AUTO: 0.04 K/UL (ref 0–0.3)
IMM GRANULOCYTES NFR BLD: 1 % (ref 0–5)
LYMPHOCYTES NFR BLD: 1.16 K/UL (ref 1–4.8)
LYMPHOCYTES RELATIVE PERCENT: 15 % (ref 15–40)
MCH RBC QN AUTO: 26.9 PG (ref 26–34)
MCHC RBC AUTO-ENTMCNC: 33 G/DL (ref 31–37)
MCV RBC AUTO: 81.7 FL (ref 80–100)
MONOCYTES NFR BLD: 0.2 K/UL (ref 0–1)
MONOCYTES NFR BLD: 3 % (ref 4–8)
NEUTROPHILS NFR BLD: 79 % (ref 47–75)
NEUTS SEG NFR BLD: 6.12 K/UL (ref 2.5–7)
PLATELET # BLD AUTO: 284 K/UL (ref 140–450)
PMV BLD AUTO: 9.4 FL (ref 6–12)
POTASSIUM SERPL-SCNC: 3.9 MMOL/L (ref 3.7–5.3)
RBC # BLD AUTO: 3.23 M/UL (ref 4–5.2)
SARS-COV-2 RDRP RESP QL NAA+PROBE: NOT DETECTED
SODIUM SERPL-SCNC: 136 MMOL/L (ref 135–144)
SPECIMEN DESCRIPTION: NORMAL
WBC OTHER # BLD: 7.7 K/UL (ref 3.5–11)

## 2024-10-18 PROCEDURE — 6370000000 HC RX 637 (ALT 250 FOR IP): Performed by: INTERNAL MEDICINE

## 2024-10-18 PROCEDURE — 80048 BASIC METABOLIC PNL TOTAL CA: CPT

## 2024-10-18 PROCEDURE — 87635 SARS-COV-2 COVID-19 AMP PRB: CPT

## 2024-10-18 PROCEDURE — 36415 COLL VENOUS BLD VENIPUNCTURE: CPT

## 2024-10-18 PROCEDURE — 2580000003 HC RX 258: Performed by: INTERNAL MEDICINE

## 2024-10-18 PROCEDURE — 85025 COMPLETE CBC W/AUTO DIFF WBC: CPT

## 2024-10-18 PROCEDURE — 94761 N-INVAS EAR/PLS OXIMETRY MLT: CPT

## 2024-10-18 PROCEDURE — 82947 ASSAY GLUCOSE BLOOD QUANT: CPT

## 2024-10-18 RX ORDER — LACTOBACILLUS RHAMNOSUS GG 10B CELL
1 CAPSULE ORAL
DISCHARGE
Start: 2024-10-18

## 2024-10-18 RX ORDER — SODIUM BICARBONATE 650 MG/1
650 TABLET ORAL 3 TIMES DAILY
DISCHARGE
Start: 2024-10-18

## 2024-10-18 RX ORDER — INSULIN LISPRO 100 [IU]/ML
0-4 INJECTION, SOLUTION INTRAVENOUS; SUBCUTANEOUS
DISCHARGE
Start: 2024-10-18

## 2024-10-18 RX ORDER — POLYETHYLENE GLYCOL 3350 17 G/17G
17 POWDER, FOR SOLUTION ORAL DAILY PRN
DISCHARGE
Start: 2024-10-18 | End: 2024-11-17

## 2024-10-18 RX ADMIN — ATORVASTATIN CALCIUM 20 MG: 20 TABLET, FILM COATED ORAL at 08:46

## 2024-10-18 RX ADMIN — Medication 1 CAPSULE: at 08:46

## 2024-10-18 RX ADMIN — SODIUM BICARBONATE 650 MG: 650 TABLET ORAL at 08:46

## 2024-10-18 RX ADMIN — SODIUM CHLORIDE, PRESERVATIVE FREE 10 ML: 5 INJECTION INTRAVENOUS at 08:49

## 2024-10-18 RX ADMIN — CHOLECALCIFEROL TAB 25 MCG (1000 UNIT) 2000 UNITS: 25 TAB at 08:46

## 2024-10-18 RX ADMIN — PAROXETINE HYDROCHLORIDE 20 MG: 20 TABLET, FILM COATED ORAL at 08:46

## 2024-10-18 RX ADMIN — ASPIRIN 81 MG 81 MG: 81 TABLET ORAL at 08:46

## 2024-10-18 RX ADMIN — PANTOPRAZOLE SODIUM 40 MG: 40 TABLET, DELAYED RELEASE ORAL at 06:08

## 2024-10-18 NOTE — DISCHARGE SUMMARY
Hospitalist Discharge Summary    Patient:  Tawanna Antonio  YOB: 1931    MRN: [id number]   Acct: [id number]    Primary Care Physician: Bryson Ardon MD    Admit date:  10/13/2024    Discharge date:  10/18/2024       Discharge Diagnoses:   1.  Sepsis due to UTI with urine cultures positive for Aerococcus urinae  2.  Acute on CKD stage IIIb  3.  Altered mental status due to sepsis, improved  4.  Metabolic acidosis  5.  Polycystic kidney disease  6.  Moderate malnutrition  7.  Hypokalemia  8.  Generalized weakness  9.  Acute on chronic anemia, s/p 1 unit of PRBC transfusion on 10/17/2024  10.  GERD  11.  Probable dementia  12.  History of depression and anxiety          Discharge Medications:         Medication List        START taking these medications      insulin lispro 100 UNIT/ML Soln injection vial  Commonly known as: HUMALOG,ADMELOG  Inject 0-4 Units into the skin 4 times daily (before meals and nightly)     lactobacillus capsule  Take 1 capsule by mouth daily (with breakfast)     polyethylene glycol 17 g packet  Commonly known as: GLYCOLAX  Take 1 packet by mouth daily as needed for Constipation     sodium bicarbonate 650 MG tablet  Take 1 tablet by mouth 3 times daily            CONTINUE taking these medications      aspirin 81 MG tablet     lansoprazole 30 MG delayed release capsule  Commonly known as: PREVACID  TAKE 1 CAPSULE BY MOUTH DAILY     lovastatin 40 MG tablet  Commonly known as: MEVACOR  TAKE 1 TABLET BY MOUTH NIGHTLY     PARoxetine 20 MG tablet  Commonly known as: PAXIL  TAKE 1 TABLET BY MOUTH EVERY DAY IN THE MORNING     vitamin D 50 MCG (2000 UT) Caps capsule  Commonly known as: CHOLECALCIFEROL  Take 1 capsule by mouth daily.            STOP taking these medications      FIBER PO     linagliptin 5 MG tablet  Commonly known as: Tradjenta     lisinopril 10 MG tablet  Commonly known as: PRINIVIL;ZESTRIL     triamterene-hydroCHLOROthiazide 37.5-25 MG per capsule  Commonly known 
all other ROS negative except as per HPI

## 2024-10-18 NOTE — FLOWSHEET NOTE
10/18/24 0854   Treatment Team Notification   Reason for Communication Medication concern   Name of Team Member Notified Dr Guzman   Treatment Team Role Attending Provider   Method of Communication Secure Message   Response See orders     To hold Heparin dose this morning per Dr. Guzman.     IV removed.

## 2024-10-18 NOTE — PLAN OF CARE
Problem: Chronic Conditions and Co-morbidities  Goal: Patient's chronic conditions and co-morbidity symptoms are monitored and maintained or improved  Outcome: Progressing  Flowsheets (Taken 10/17/2024 1945)  Care Plan - Patient's Chronic Conditions and Co-Morbidity Symptoms are Monitored and Maintained or Improved:   Monitor and assess patient's chronic conditions and comorbid symptoms for stability, deterioration, or improvement   Collaborate with multidisciplinary team to address chronic and comorbid conditions and prevent exacerbation or deterioration   Update acute care plan with appropriate goals if chronic or comorbid symptoms are exacerbated and prevent overall improvement and discharge     Problem: Discharge Planning  Goal: Discharge to home or other facility with appropriate resources  Outcome: Progressing  Flowsheets (Taken 10/17/2024 1945)  Discharge to home or other facility with appropriate resources:   Identify barriers to discharge with patient and caregiver   Arrange for needed discharge resources and transportation as appropriate   Identify discharge learning needs (meds, wound care, etc)     Problem: Safety - Adult  Goal: Free from fall injury  Outcome: Progressing  Flowsheets (Taken 10/17/2024 2000)  Free From Fall Injury:   Instruct family/caregiver on patient safety   Based on caregiver fall risk screen, instruct family/caregiver to ask for assistance with transferring infant if caregiver noted to have fall risk factors     Problem: ABCDS Injury Assessment  Goal: Absence of physical injury  Outcome: Progressing  Flowsheets (Taken 10/17/2024 2000)  Absence of Physical Injury: Implement safety measures based on patient assessment     Problem: Pain  Goal: Verbalizes/displays adequate comfort level or baseline comfort level  Outcome: Progressing     Problem: Confusion  Goal: Confusion, delirium, dementia, or psychosis is improved or at baseline  Description: INTERVENTIONS:  1. Assess for possible

## 2024-10-18 NOTE — PROGRESS NOTES
Acknowledge pt discharge to [facility] today. [facility] to come for pt around  am. Message left for son Deandre regarding time and spoke with pt's dtr in law April. They are in agreement for transfer this date. HENS completed and faxed to [facility]. Covid test completed for [facility] as well. Discharge paperwork faxed to facility. No further needs identified. Syeda Gresham MSW LSW 10/18/2024

## 2024-10-18 NOTE — CARE COORDINATION
10/18/24 1033   IMM Letter   IMM Letter given to Patient/Family/Significant other/Guardian/POA/by: second IMM letter given to daughter-in-law April Pravin per Nirmal Nieves RN   IMM Letter date given: 10/18/24   IMM Letter time given: 0915       IMM letter provided to patient.  Patient offered four hours to make informed decision regarding appeal process; patient agreeable to discharge.

## 2024-10-19 LAB
MICROORGANISM SPEC CULT: NORMAL
MICROORGANISM SPEC CULT: NORMAL
SERVICE CMNT-IMP: NORMAL
SERVICE CMNT-IMP: NORMAL
SPECIMEN DESCRIPTION: NORMAL
SPECIMEN DESCRIPTION: NORMAL

## 2024-10-21 ENCOUNTER — TELEPHONE (OUTPATIENT)
Dept: FAMILY MEDICINE CLINIC | Age: 89
End: 2024-10-21

## 2024-10-21 ENCOUNTER — HOSPITAL ENCOUNTER (OUTPATIENT)
Age: 89
Setting detail: SPECIMEN
Discharge: HOME | End: 2024-10-21

## 2024-10-21 LAB
ALBUMIN SERPL-MCNC: 2.4 G/DL (ref 3.5–5.2)
ALP SERPL-CCNC: 120 U/L (ref 35–104)
ALT SERPL-CCNC: 7 U/L (ref 5–33)
ANION GAP SERPL CALCULATED.3IONS-SCNC: 8 MMOL/L (ref 9–17)
AST SERPL-CCNC: 8 U/L
BASOPHILS # BLD: 0.01 K/UL (ref 0–0.2)
BASOPHILS NFR BLD: 0 % (ref 0–2)
BILIRUB SERPL-MCNC: 0.1 MG/DL (ref 0.3–1.2)
BUN SERPL-MCNC: 32 MG/DL (ref 8–23)
BUN/CREAT SERPL: 13 (ref 9–20)
CALCIUM SERPL-MCNC: 8.8 MG/DL (ref 8.6–10.4)
CHLORIDE SERPL-SCNC: 108 MMOL/L (ref 98–107)
CO2 SERPL-SCNC: 22 MMOL/L (ref 20–31)
CREAT SERPL-MCNC: 2.5 MG/DL (ref 0.5–0.9)
EOSINOPHIL # BLD: 0.27 K/UL (ref 0–0.4)
EOSINOPHILS RELATIVE PERCENT: 3 % (ref 0–5)
ERYTHROCYTE [DISTWIDTH] IN BLOOD BY AUTOMATED COUNT: 16.8 % (ref 12.1–15.2)
GFR, ESTIMATED: 17 ML/MIN/1.73M2
GLUCOSE SERPL-MCNC: 116 MG/DL (ref 70–99)
HCT VFR BLD AUTO: 24.3 % (ref 36–46)
HGB BLD-MCNC: 7.8 G/DL (ref 12–16)
IMM GRANULOCYTES # BLD AUTO: 0.03 K/UL (ref 0–0.3)
IMM GRANULOCYTES NFR BLD: 0 % (ref 0–5)
LYMPHOCYTES NFR BLD: 2.02 K/UL (ref 1–4.8)
LYMPHOCYTES RELATIVE PERCENT: 25 % (ref 15–40)
MCH RBC QN AUTO: 26.5 PG (ref 26–34)
MCHC RBC AUTO-ENTMCNC: 32.1 G/DL (ref 31–37)
MCV RBC AUTO: 82.7 FL (ref 80–100)
MONOCYTES NFR BLD: 0.51 K/UL (ref 0–1)
MONOCYTES NFR BLD: 6 % (ref 4–8)
NEUTROPHILS NFR BLD: 65 % (ref 47–75)
NEUTS SEG NFR BLD: 5.24 K/UL (ref 2.5–7)
PLATELET # BLD AUTO: 305 K/UL (ref 140–450)
PMV BLD AUTO: 9.6 FL (ref 6–12)
POTASSIUM SERPL-SCNC: 4.1 MMOL/L (ref 3.7–5.3)
PROT SERPL-MCNC: 4.8 G/DL (ref 6.4–8.3)
RBC # BLD AUTO: 2.94 M/UL (ref 4–5.2)
SODIUM SERPL-SCNC: 138 MMOL/L (ref 135–144)
WBC OTHER # BLD: 8.1 K/UL (ref 3.5–11)

## 2024-10-21 PROCEDURE — 85025 COMPLETE CBC W/AUTO DIFF WBC: CPT

## 2024-10-21 PROCEDURE — 80053 COMPREHEN METABOLIC PANEL: CPT

## 2024-10-21 NOTE — TELEPHONE ENCOUNTER
Care Transitions Initial Follow Up Call    Outreach made within 2 business days of discharge: Yes    Patient: Tawanna Antonio Patient : 10/20/1931   MRN: [id number]    Reason for Admission: UTI/ Sepsis  Discharge Date:  10/18/24  Admitted 10/13/24     Spoke with:  #1LVM for pt to return our call 10/21/24  #2 LVM for pt to return our call 10/22/24    Discharge department/facility: [facility]    Pt was discharged to SNF. Call no longer needed.   BLESSING TRUJILLO

## 2024-10-22 ENCOUNTER — COMMUNITY CARE MANAGEMENT (OUTPATIENT)
Facility: CLINIC | Age: 89
End: 2024-10-22

## 2024-10-22 LAB
ABO/RH: NORMAL
ANTIBODY SCREEN: NEGATIVE
ARM BAND NUMBER: NORMAL
ARM BAND NUMBER: NORMAL
BLOOD BANK BLOOD PRODUCT EXPIRATION DATE: NORMAL
BLOOD BANK DISPENSE STATUS: NORMAL
BLOOD BANK ISBT PRODUCT BLOOD TYPE: 9500
BLOOD BANK SAMPLE EXPIRATION: NORMAL
BLOOD BANK UNIT TYPE AND RH: NORMAL
BPU ID: NORMAL
COMPONENT: NORMAL
CROSSMATCH RESULT: NORMAL
TRANSFUSION STATUS: NORMAL
UNIT DIVISION: 0
UNIT ISSUE DATE/TIME: NORMAL

## 2024-10-25 ENCOUNTER — HOSPITAL ENCOUNTER (OUTPATIENT)
Age: 89
Discharge: HOME | End: 2024-10-25
Payer: COMMERCIAL

## 2024-10-25 LAB
-: ABNORMAL
BACTERIA URNS QL MICRO: ABNORMAL
BASOPHILS # BLD: 0.02 K/UL (ref 0–0.2)
BASOPHILS NFR BLD: 0 % (ref 0–2)
BILIRUB UR QL STRIP: NEGATIVE
CLARITY UR: ABNORMAL
COLOR UR: ABNORMAL
COMMENT: ABNORMAL
EOSINOPHIL # BLD: 0.17 K/UL (ref 0–0.4)
EOSINOPHILS RELATIVE PERCENT: 1 % (ref 0–5)
ERYTHROCYTE [DISTWIDTH] IN BLOOD BY AUTOMATED COUNT: 16.7 % (ref 12.1–15.2)
GLUCOSE UR STRIP-MCNC: NEGATIVE MG/DL
HCT VFR BLD AUTO: 28.3 % (ref 36–46)
HGB BLD-MCNC: 8.9 G/DL (ref 12–16)
HGB UR QL STRIP.AUTO: ABNORMAL
IMM GRANULOCYTES # BLD AUTO: 0.02 K/UL (ref 0–0.3)
IMM GRANULOCYTES NFR BLD: 0 % (ref 0–5)
KETONES UR STRIP-MCNC: NEGATIVE MG/DL
LEUKOCYTE ESTERASE UR QL STRIP: ABNORMAL
LYMPHOCYTES NFR BLD: 2 K/UL (ref 1–4.8)
LYMPHOCYTES RELATIVE PERCENT: 17 % (ref 15–40)
MCH RBC QN AUTO: 26.2 PG (ref 26–34)
MCHC RBC AUTO-ENTMCNC: 31.4 G/DL (ref 31–37)
MCV RBC AUTO: 83.2 FL (ref 80–100)
MONOCYTES NFR BLD: 0.44 K/UL (ref 0–1)
MONOCYTES NFR BLD: 4 % (ref 4–8)
NEUTROPHILS NFR BLD: 78 % (ref 47–75)
NEUTS SEG NFR BLD: 9.32 K/UL (ref 2.5–7)
NITRITE UR QL STRIP: POSITIVE
PH UR STRIP: 7 [PH] (ref 5–8)
PLATELET # BLD AUTO: 290 K/UL (ref 140–450)
PMV BLD AUTO: 9 FL (ref 6–12)
PROT UR STRIP-MCNC: ABNORMAL MG/DL
RBC # BLD AUTO: 3.4 M/UL (ref 4–5.2)
RBC #/AREA URNS HPF: ABNORMAL /HPF (ref 0–2)
SP GR UR STRIP: 1 (ref 1–1.03)
UROBILINOGEN UR STRIP-ACNC: NORMAL EU/DL (ref 0–1)
WBC #/AREA URNS HPF: ABNORMAL /HPF
WBC OTHER # BLD: 12 K/UL (ref 3.5–11)

## 2024-10-25 PROCEDURE — 85025 COMPLETE CBC W/AUTO DIFF WBC: CPT

## 2024-10-25 PROCEDURE — 81001 URINALYSIS AUTO W/SCOPE: CPT

## 2024-10-25 PROCEDURE — 36415 COLL VENOUS BLD VENIPUNCTURE: CPT

## 2024-10-25 PROCEDURE — 87086 URINE CULTURE/COLONY COUNT: CPT

## 2024-10-26 LAB
MICROORGANISM SPEC CULT: NORMAL
SPECIMEN DESCRIPTION: NORMAL

## 2024-10-28 ENCOUNTER — HOSPITAL ENCOUNTER (OUTPATIENT)
Age: 89
Setting detail: SPECIMEN
Discharge: HOME | End: 2024-10-28
Payer: MEDICARE

## 2024-10-28 ENCOUNTER — HOSPITAL ENCOUNTER (OUTPATIENT)
Dept: NURSING | Age: 89
Setting detail: INFUSION SERIES
Discharge: HOME | End: 2024-10-28
Payer: MEDICARE

## 2024-10-28 ENCOUNTER — HOSPITAL ENCOUNTER (OUTPATIENT)
Age: 89
Discharge: HOME | End: 2024-10-28
Payer: COMMERCIAL

## 2024-10-28 VITALS
HEART RATE: 84 BPM | OXYGEN SATURATION: 95 % | TEMPERATURE: 98.2 F | DIASTOLIC BLOOD PRESSURE: 64 MMHG | RESPIRATION RATE: 16 BRPM | SYSTOLIC BLOOD PRESSURE: 116 MMHG

## 2024-10-28 LAB
ABO + RH BLD: NORMAL
ALBUMIN SERPL-MCNC: 2.8 G/DL (ref 3.5–5.2)
ALP SERPL-CCNC: 104 U/L (ref 35–104)
ALT SERPL-CCNC: 7 U/L (ref 5–33)
ANION GAP SERPL CALCULATED.3IONS-SCNC: 11 MMOL/L (ref 9–17)
AST SERPL-CCNC: 10 U/L
BASOPHILS # BLD: 0.02 K/UL (ref 0–0.2)
BASOPHILS # BLD: 0.03 K/UL (ref 0–0.2)
BASOPHILS NFR BLD: 0 % (ref 0–2)
BASOPHILS NFR BLD: 0 % (ref 0–2)
BILIRUB SERPL-MCNC: 0.3 MG/DL (ref 0.3–1.2)
BLOOD BANK SAMPLE EXPIRATION: NORMAL
BLOOD GROUP ANTIBODIES SERPL: NEGATIVE
BUN SERPL-MCNC: 26 MG/DL (ref 8–23)
BUN/CREAT SERPL: 12 (ref 9–20)
CALCIUM SERPL-MCNC: 9.1 MG/DL (ref 8.6–10.4)
CHLORIDE SERPL-SCNC: 98 MMOL/L (ref 98–107)
CO2 SERPL-SCNC: 27 MMOL/L (ref 20–31)
CREAT SERPL-MCNC: 2.1 MG/DL (ref 0.5–0.9)
EOSINOPHIL # BLD: 0.02 K/UL (ref 0–0.4)
EOSINOPHIL # BLD: 0.12 K/UL (ref 0–0.4)
EOSINOPHILS RELATIVE PERCENT: 0 % (ref 0–5)
EOSINOPHILS RELATIVE PERCENT: 1 % (ref 0–5)
ERYTHROCYTE [DISTWIDTH] IN BLOOD BY AUTOMATED COUNT: 16.7 % (ref 12.1–15.2)
ERYTHROCYTE [DISTWIDTH] IN BLOOD BY AUTOMATED COUNT: 16.8 % (ref 12.1–15.2)
GFR, ESTIMATED: 22 ML/MIN/1.73M2
GLUCOSE SERPL-MCNC: 140 MG/DL (ref 70–99)
HCT VFR BLD AUTO: 21.2 % (ref 36–46)
HCT VFR BLD AUTO: 27.7 % (ref 36–46)
HGB BLD-MCNC: 6.7 G/DL (ref 12–16)
HGB BLD-MCNC: 8.6 G/DL (ref 12–16)
IMM GRANULOCYTES # BLD AUTO: 0.01 K/UL (ref 0–0.3)
IMM GRANULOCYTES # BLD AUTO: 0.01 K/UL (ref 0–0.3)
IMM GRANULOCYTES NFR BLD: 0 % (ref 0–5)
IMM GRANULOCYTES NFR BLD: 0 % (ref 0–5)
LYMPHOCYTES NFR BLD: 1.36 K/UL (ref 1–4.8)
LYMPHOCYTES NFR BLD: 1.9 K/UL (ref 1–4.8)
LYMPHOCYTES RELATIVE PERCENT: 15 % (ref 15–40)
LYMPHOCYTES RELATIVE PERCENT: 16 % (ref 15–40)
MCH RBC QN AUTO: 26 PG (ref 26–34)
MCH RBC QN AUTO: 26.1 PG (ref 26–34)
MCHC RBC AUTO-ENTMCNC: 31 G/DL (ref 31–37)
MCHC RBC AUTO-ENTMCNC: 31.6 G/DL (ref 31–37)
MCV RBC AUTO: 82.2 FL (ref 80–100)
MCV RBC AUTO: 84.2 FL (ref 80–100)
MONOCYTES NFR BLD: 0.46 K/UL (ref 0–1)
MONOCYTES NFR BLD: 0.57 K/UL (ref 0–1)
MONOCYTES NFR BLD: 5 % (ref 4–8)
MONOCYTES NFR BLD: 5 % (ref 4–8)
NEUTROPHILS NFR BLD: 78 % (ref 47–75)
NEUTROPHILS NFR BLD: 80 % (ref 47–75)
NEUTS SEG NFR BLD: 7.31 K/UL (ref 2.5–7)
NEUTS SEG NFR BLD: 9.03 K/UL (ref 2.5–7)
PLATELET # BLD AUTO: 245 K/UL (ref 140–450)
PLATELET # BLD AUTO: 304 K/UL (ref 140–450)
PMV BLD AUTO: 10.1 FL (ref 6–12)
PMV BLD AUTO: 9.7 FL (ref 6–12)
POTASSIUM SERPL-SCNC: 4.1 MMOL/L (ref 3.7–5.3)
PROT SERPL-MCNC: 5.6 G/DL (ref 6.4–8.3)
RBC # BLD AUTO: 2.58 M/UL (ref 4–5.2)
RBC # BLD AUTO: 3.29 M/UL (ref 4–5.2)
SODIUM SERPL-SCNC: 136 MMOL/L (ref 135–144)
WBC OTHER # BLD: 11.7 K/UL (ref 3.5–11)
WBC OTHER # BLD: 9.2 K/UL (ref 3.5–11)

## 2024-10-28 PROCEDURE — 36415 COLL VENOUS BLD VENIPUNCTURE: CPT

## 2024-10-28 PROCEDURE — 85025 COMPLETE CBC W/AUTO DIFF WBC: CPT

## 2024-10-28 PROCEDURE — 86901 BLOOD TYPING SEROLOGIC RH(D): CPT

## 2024-10-28 PROCEDURE — 86850 RBC ANTIBODY SCREEN: CPT

## 2024-10-28 PROCEDURE — 2580000003 HC RX 258: Performed by: NURSE PRACTITIONER

## 2024-10-28 PROCEDURE — 86900 BLOOD TYPING SEROLOGIC ABO: CPT

## 2024-10-28 PROCEDURE — 80053 COMPREHEN METABOLIC PANEL: CPT

## 2024-10-28 RX ORDER — SODIUM CHLORIDE 9 MG/ML
INJECTION, SOLUTION INTRAVENOUS PRN
Status: COMPLETED | OUTPATIENT
Start: 2024-10-28 | End: 2024-10-28

## 2024-10-28 RX ORDER — FUROSEMIDE 10 MG/ML
20 INJECTION INTRAMUSCULAR; INTRAVENOUS PRN
Status: DISCONTINUED | OUTPATIENT
Start: 2024-10-28 | End: 2024-10-29 | Stop reason: HOSPADM

## 2024-10-28 RX ADMIN — SODIUM CHLORIDE: 9 INJECTION, SOLUTION INTRAVENOUS at 16:58

## 2024-10-28 NOTE — PROGRESS NOTES
[facility] due to low hemoglobin (6.7 per [facility]).  Patient labs and IV obtained upon arrival.  Hemoglobin resulted 8.6.  This RN called Marylin FRANCE at [facility] and informed her of lab results and asked her to notify the prescribing physician.  Marylin FRANCE reports she notified Roxana Patiño NP.  Per Marylin FRANCE, Roxana Patiño NP stated to send patient back to [facility] and they will continue to monitor patient labs.      [place] will return to the [facility] after finishing her dinner tray.

## 2024-10-29 ENCOUNTER — HOSPITAL ENCOUNTER (OUTPATIENT)
Age: 89
Setting detail: SPECIMEN
Discharge: HOME | End: 2024-10-29

## 2024-10-29 DIAGNOSIS — D64.9 ANEMIA, UNSPECIFIED TYPE: Primary | ICD-10-CM

## 2024-10-29 LAB
DATE, STOOL #1: ABNORMAL
HEMOCCULT SP1 STL QL: POSITIVE
TIME, STOOL #1: 1405

## 2024-11-01 ENCOUNTER — HOSPITAL ENCOUNTER (OUTPATIENT)
Age: 89
Setting detail: SPECIMEN
Discharge: HOME | End: 2024-11-01
Payer: COMMERCIAL

## 2024-11-01 LAB
-: ABNORMAL
BACTERIA URNS QL MICRO: ABNORMAL
BILIRUB UR QL STRIP: NEGATIVE
CLARITY UR: ABNORMAL
COLOR UR: ABNORMAL
COMMENT: ABNORMAL
GLUCOSE UR STRIP-MCNC: NEGATIVE MG/DL
HCT VFR BLD AUTO: 24.4 % (ref 36–46)
HGB BLD-MCNC: 7.5 G/DL (ref 12–16)
HGB UR QL STRIP.AUTO: ABNORMAL
KETONES UR STRIP-MCNC: NEGATIVE MG/DL
LEUKOCYTE ESTERASE UR QL STRIP: ABNORMAL
NITRITE UR QL STRIP: POSITIVE
PH UR STRIP: 7 [PH] (ref 5–8)
PROT UR STRIP-MCNC: ABNORMAL MG/DL
RBC #/AREA URNS HPF: ABNORMAL /HPF (ref 0–2)
SP GR UR STRIP: 1.01 (ref 1–1.03)
UROBILINOGEN UR STRIP-ACNC: NORMAL EU/DL (ref 0–1)
WBC #/AREA URNS HPF: ABNORMAL /HPF

## 2024-11-01 PROCEDURE — 85018 HEMOGLOBIN: CPT

## 2024-11-01 PROCEDURE — 85014 HEMATOCRIT: CPT

## 2024-11-02 LAB
MICROORGANISM SPEC CULT: NO GROWTH
SPECIMEN DESCRIPTION: NORMAL

## 2024-11-04 ENCOUNTER — HOSPITAL ENCOUNTER (OUTPATIENT)
Age: 89
Setting detail: SPECIMEN
Discharge: HOME | End: 2024-11-04
Payer: COMMERCIAL

## 2024-11-04 PROBLEM — K25.9 GASTRIC ULCER: Status: ACTIVE | Noted: 2022-12-13

## 2024-11-04 PROBLEM — K25.9 MULTIPLE GASTRIC EROSIONS: Status: ACTIVE | Noted: 2024-11-04

## 2024-11-04 PROBLEM — L72.0 EPIDERMOID CYST OF FACE: Status: ACTIVE | Noted: 2024-11-04

## 2024-11-04 PROBLEM — I10 HYPERTENSION: Status: ACTIVE | Noted: 2024-11-04

## 2024-11-04 PROBLEM — R22.0 FACIAL MASS: Status: ACTIVE | Noted: 2024-11-04

## 2024-11-04 PROBLEM — F32.9 MAJOR DEPRESSIVE DISORDER: Status: ACTIVE | Noted: 2024-11-04

## 2024-11-04 PROBLEM — L72.0 EPIDERMOID CYST: Status: ACTIVE | Noted: 2022-11-17

## 2024-11-04 LAB
ALBUMIN SERPL-MCNC: 3 G/DL (ref 3.5–5.2)
ALP SERPL-CCNC: 110 U/L (ref 35–104)
ALT SERPL-CCNC: 6 U/L (ref 5–33)
ANION GAP SERPL CALCULATED.3IONS-SCNC: 8 MMOL/L (ref 9–17)
AST SERPL-CCNC: 10 U/L
BASOPHILS # BLD: 0.01 K/UL (ref 0–0.2)
BASOPHILS NFR BLD: 0 % (ref 0–2)
BILIRUB SERPL-MCNC: 0.3 MG/DL (ref 0.3–1.2)
BUN SERPL-MCNC: 28 MG/DL (ref 8–23)
BUN/CREAT SERPL: 11 (ref 9–20)
CALCIUM SERPL-MCNC: 9.1 MG/DL (ref 8.6–10.4)
CHLORIDE SERPL-SCNC: 97 MMOL/L (ref 98–107)
CO2 SERPL-SCNC: 31 MMOL/L (ref 20–31)
CREAT SERPL-MCNC: 2.6 MG/DL (ref 0.5–0.9)
EOSINOPHIL # BLD: 0.23 K/UL (ref 0–0.4)
EOSINOPHILS RELATIVE PERCENT: 2 % (ref 0–5)
ERYTHROCYTE [DISTWIDTH] IN BLOOD BY AUTOMATED COUNT: 17.1 % (ref 12.1–15.2)
GFR, ESTIMATED: 17 ML/MIN/1.73M2
GLUCOSE SERPL-MCNC: 88 MG/DL (ref 70–99)
HCT VFR BLD AUTO: 24.6 % (ref 36–46)
HGB BLD-MCNC: 7.6 G/DL (ref 12–16)
IMM GRANULOCYTES # BLD AUTO: 0.03 K/UL (ref 0–0.3)
IMM GRANULOCYTES NFR BLD: 0 % (ref 0–5)
LYMPHOCYTES NFR BLD: 1.82 K/UL (ref 1–4.8)
LYMPHOCYTES RELATIVE PERCENT: 19 % (ref 15–40)
MCH RBC QN AUTO: 26.4 PG (ref 26–34)
MCHC RBC AUTO-ENTMCNC: 30.9 G/DL (ref 31–37)
MCV RBC AUTO: 85.4 FL (ref 80–100)
MONOCYTES NFR BLD: 0.51 K/UL (ref 0–1)
MONOCYTES NFR BLD: 5 % (ref 4–8)
NEUTROPHILS NFR BLD: 73 % (ref 47–75)
NEUTS SEG NFR BLD: 6.94 K/UL (ref 2.5–7)
PLATELET # BLD AUTO: 417 K/UL (ref 140–450)
PMV BLD AUTO: 9.2 FL (ref 6–12)
POTASSIUM SERPL-SCNC: 4.6 MMOL/L (ref 3.7–5.3)
PROT SERPL-MCNC: 6 G/DL (ref 6.4–8.3)
RBC # BLD AUTO: 2.88 M/UL (ref 4–5.2)
SODIUM SERPL-SCNC: 136 MMOL/L (ref 135–144)
WBC OTHER # BLD: 9.5 K/UL (ref 3.5–11)

## 2024-11-04 PROCEDURE — 80053 COMPREHEN METABOLIC PANEL: CPT

## 2024-11-04 PROCEDURE — 85025 COMPLETE CBC W/AUTO DIFF WBC: CPT

## 2024-11-04 RX ORDER — ATORVASTATIN CALCIUM 10 MG/1
10 TABLET, FILM COATED ORAL DAILY
COMMUNITY

## 2024-11-05 DIAGNOSIS — E78.2 MIXED HYPERLIPIDEMIA: ICD-10-CM

## 2024-11-05 RX ORDER — LOVASTATIN 40 MG/1
TABLET ORAL
Qty: 60 TABLET | Refills: 2 | Status: SHIPPED | OUTPATIENT
Start: 2024-11-05

## 2024-11-05 NOTE — TELEPHONE ENCOUNTER
Last OV 5/24/24      Next OV 11/14/24    Requesting refill on lovastatin thru [facility]   Rx pending

## 2024-11-06 ENCOUNTER — HOSPITAL ENCOUNTER (OUTPATIENT)
Age: 89
Discharge: HOME | End: 2024-11-06
Payer: MEDICARE

## 2024-11-06 ENCOUNTER — HOSPITAL ENCOUNTER (OUTPATIENT)
Dept: NURSING | Age: 89
Setting detail: INFUSION SERIES
Discharge: HOME | End: 2024-11-06

## 2024-11-06 LAB
HCT VFR BLD AUTO: 23.8 % (ref 36–46)
HGB BLD-MCNC: 7.4 G/DL (ref 12–16)

## 2024-11-06 PROCEDURE — 85014 HEMATOCRIT: CPT

## 2024-11-06 PROCEDURE — 85018 HEMOGLOBIN: CPT

## 2024-11-06 PROCEDURE — 36415 COLL VENOUS BLD VENIPUNCTURE: CPT

## 2024-11-06 RX ORDER — FUROSEMIDE 10 MG/ML
20 INJECTION INTRAMUSCULAR; INTRAVENOUS PRN
Status: DISCONTINUED | OUTPATIENT
Start: 2024-11-06 | End: 2024-11-07 | Stop reason: HOSPADM

## 2024-11-06 RX ORDER — SODIUM CHLORIDE 9 MG/ML
INJECTION, SOLUTION INTRAVENOUS PRN
Status: DISCONTINUED | OUTPATIENT
Start: 2024-11-06 | End: 2024-11-07 | Stop reason: HOSPADM

## 2024-11-07 ENCOUNTER — TELEPHONE (OUTPATIENT)
Dept: FAMILY MEDICINE CLINIC | Age: 89
End: 2024-11-07

## 2024-11-07 NOTE — TELEPHONE ENCOUNTER
Care Transitions Initial Follow Up Call    Outreach made within 2 business days of discharge: Yes    Patient: Tawanna Antonio Patient : 10/20/1931   MRN: [id number]  Reason for Admission: Sepsis due to UTI  Discharge Date: 24       Spoke with: Deandre (son)    Discharge department/facility: [facility] Patient Contact:  Was patient able to fill all prescriptions: Yes  Was patient instructed to bring all medications to the follow-up visit: Yes  Is patient taking all medications as directed in the discharge summary? Yes  Does patient understand their discharge instructions: Yes  Does patient have questions or concerns that need addressed prior to 7-14 day follow up office visit: no    Additional needs identified to be addressed with provider  No needs identified             Scheduled appointment with PCP within 7-14 days    Follow Up  Future Appointments   Date Time Provider Department Center   2024  1:15 PM Margarito Cardona MD [facility]   2024  9:30 AM Bryson Ardon MD [facility] DEP   2024  7:15 AM Bryson Ardon MD [facility] DEP       BLESSING TRUJILLO

## 2024-11-08 ENCOUNTER — COMMUNITY CARE MANAGEMENT (OUTPATIENT)
Facility: CLINIC | Age: 89
End: 2024-11-08

## 2024-11-08 NOTE — PROGRESS NOTES
Documentation on Initial call BACKGROUND: 89 year old female who was sent to the hospital bc she was not able to get up from her chair. They called 911 and transported her to the hospital. MD discovered that she had low Hgb, transfused her with 2 units of PBRC's, other test were ran and she was diagnosed with UTI. She was sent to an SNF for rehab. Just discharged home yesterday 11/7/24. HIPPA verified, disclaimer provided. Discharge instructions received and pt verbalized understanding. DISCHARGE DIAGNOSIS: UTI, pt was in inpt rehab DISCHARGE DATE: 11/7/24 PAST MEDICAL HISTORY: CKD stage 4, DM, HTN SUPPORT: Pt lives at home alone, has family that comes to assist her with her care. JASMYN is currently with her. ASSESSMENT: Reported that she is having vaginal bleeding occasionally . Trying to determine if its vaginal bleeding or blood in urine from kidneys. Has not complained of any pain, but if she had pain she would not c/o of any pain d/t she does not want to go to hospital. Some swelling noted. Denies shortness of breath or chest pain. Wears briefs-incontinence. Has difficulty cleaning herself after she has had a BM. SELF MANAGEMENT: HHC-RN has completed SOC and is ordering PT/OT. HHA is being discussed, is going to make contact with SW for caregiver assistance. No transportation or food needs. She is monitoring the kinds of food pt eats due to her DM. Has reduced sugar in pts diet. She has her fu's scheduled. Discussed the importance of checking wt and bp. Enc her to check BS as well. She stated that they have stopped her DM meds and they do not understand why. Is going to discuss with her PCP on follow up appt. In need of glucometer, enc to go to pharmacy and see if they can purchase one so they can monitor her BS. Discussed s/s of hypo and hyperglycemia. WT: discussed BP: discussed SWELLING IN FEET/LEGS: yes- enc her to keep elevated BLOOD SUGAR: discussed Patient confirms receipt and review of discharge

## 2024-11-12 ENCOUNTER — HOSPITAL ENCOUNTER (OUTPATIENT)
Age: 89
Setting detail: SPECIMEN
Discharge: HOME | End: 2024-11-12

## 2024-11-12 ENCOUNTER — OFFICE VISIT (OUTPATIENT)
Dept: OBGYN CLINIC | Age: 89
End: 2024-11-12
Payer: MEDICARE

## 2024-11-12 ENCOUNTER — HOSPITAL ENCOUNTER (INPATIENT)
Age: 89
LOS: 3 days | Discharge: HOME HEALTH | DRG: 690 | End: 2024-11-15
Attending: EMERGENCY MEDICINE | Admitting: INTERNAL MEDICINE
Payer: MEDICARE

## 2024-11-12 VITALS
DIASTOLIC BLOOD PRESSURE: 62 MMHG | WEIGHT: 140 LBS | BODY MASS INDEX: 24.8 KG/M2 | SYSTOLIC BLOOD PRESSURE: 116 MMHG | HEIGHT: 63 IN

## 2024-11-12 DIAGNOSIS — N32.89 BLADDER MASS: ICD-10-CM

## 2024-11-12 DIAGNOSIS — R53.1 GENERALIZED WEAKNESS: Primary | ICD-10-CM

## 2024-11-12 DIAGNOSIS — N95.0 PMB (POSTMENOPAUSAL BLEEDING): ICD-10-CM

## 2024-11-12 DIAGNOSIS — Z01.411 ENCOUNTER FOR GYNECOLOGICAL EXAMINATION (GENERAL) (ROUTINE) WITH ABNORMAL FINDINGS: ICD-10-CM

## 2024-11-12 DIAGNOSIS — N95.0 PMB (POSTMENOPAUSAL BLEEDING): Primary | ICD-10-CM

## 2024-11-12 DIAGNOSIS — N39.0 URINARY TRACT INFECTION WITHOUT HEMATURIA, SITE UNSPECIFIED: ICD-10-CM

## 2024-11-12 DIAGNOSIS — Z86.59 HISTORY OF DEMENTIA: ICD-10-CM

## 2024-11-12 LAB
-: ABNORMAL
ALBUMIN SERPL-MCNC: 2.8 G/DL (ref 3.5–5.2)
ALP SERPL-CCNC: 101 U/L (ref 35–104)
ALT SERPL-CCNC: 6 U/L (ref 5–33)
ANION GAP SERPL CALCULATED.3IONS-SCNC: 11 MMOL/L (ref 9–17)
AST SERPL-CCNC: 10 U/L
BACTERIA URNS QL MICRO: ABNORMAL
BASOPHILS # BLD: 0.02 K/UL (ref 0–0.2)
BASOPHILS NFR BLD: 0 % (ref 0–2)
BILIRUB SERPL-MCNC: 0.4 MG/DL (ref 0.3–1.2)
BILIRUB UR QL STRIP: NEGATIVE
BUN SERPL-MCNC: 23 MG/DL (ref 8–23)
BUN/CREAT SERPL: 11 (ref 9–20)
CALCIUM SERPL-MCNC: 8.9 MG/DL (ref 8.6–10.4)
CHLORIDE SERPL-SCNC: 97 MMOL/L (ref 98–107)
CLARITY UR: ABNORMAL
CO2 SERPL-SCNC: 26 MMOL/L (ref 20–31)
COLOR UR: YELLOW
COMMENT: ABNORMAL
CREAT SERPL-MCNC: 2.1 MG/DL (ref 0.5–0.9)
EOSINOPHIL # BLD: 0.15 K/UL (ref 0–0.4)
EOSINOPHILS RELATIVE PERCENT: 1 % (ref 0–5)
EPI CELLS #/AREA URNS HPF: ABNORMAL /HPF
ERYTHROCYTE [DISTWIDTH] IN BLOOD BY AUTOMATED COUNT: 17.9 % (ref 12.1–15.2)
GFR, ESTIMATED: 22 ML/MIN/1.73M2
GLUCOSE SERPL-MCNC: 153 MG/DL (ref 70–99)
GLUCOSE UR STRIP-MCNC: NEGATIVE MG/DL
HCT VFR BLD AUTO: 24 % (ref 36–46)
HGB BLD-MCNC: 7.7 G/DL (ref 12–16)
HGB UR QL STRIP.AUTO: ABNORMAL
IMM GRANULOCYTES # BLD AUTO: 0.03 K/UL (ref 0–0.3)
IMM GRANULOCYTES NFR BLD: 0 % (ref 0–5)
KETONES UR STRIP-MCNC: NEGATIVE MG/DL
LEUKOCYTE ESTERASE UR QL STRIP: ABNORMAL
LYMPHOCYTES NFR BLD: 2.18 K/UL (ref 1–4.8)
LYMPHOCYTES RELATIVE PERCENT: 18 % (ref 15–40)
MCH RBC QN AUTO: 27 PG (ref 26–34)
MCHC RBC AUTO-ENTMCNC: 32.1 G/DL (ref 31–37)
MCV RBC AUTO: 84.2 FL (ref 80–100)
MONOCYTES NFR BLD: 0.6 K/UL (ref 0–1)
MONOCYTES NFR BLD: 5 % (ref 4–8)
NEUTROPHILS NFR BLD: 75 % (ref 47–75)
NEUTS SEG NFR BLD: 8.95 K/UL (ref 2.5–7)
NITRITE UR QL STRIP: NEGATIVE
PH UR STRIP: 8 [PH] (ref 5–8)
PLATELET # BLD AUTO: 332 K/UL (ref 140–450)
PMV BLD AUTO: 9 FL (ref 6–12)
POTASSIUM SERPL-SCNC: 3.5 MMOL/L (ref 3.7–5.3)
PROT SERPL-MCNC: 6.3 G/DL (ref 6.4–8.3)
PROT UR STRIP-MCNC: ABNORMAL MG/DL
RBC # BLD AUTO: 2.85 M/UL (ref 4–5.2)
RBC #/AREA URNS HPF: ABNORMAL /HPF (ref 0–2)
SODIUM SERPL-SCNC: 134 MMOL/L (ref 135–144)
SP GR UR STRIP: 1.01 (ref 1–1.03)
UROBILINOGEN UR STRIP-ACNC: NORMAL EU/DL (ref 0–1)
WBC #/AREA URNS HPF: ABNORMAL /HPF
WBC OTHER # BLD: 11.9 K/UL (ref 3.5–11)

## 2024-11-12 PROCEDURE — 96374 THER/PROPH/DIAG INJ IV PUSH: CPT

## 2024-11-12 PROCEDURE — 81001 URINALYSIS AUTO W/SCOPE: CPT

## 2024-11-12 PROCEDURE — 1159F MED LIST DOCD IN RCRD: CPT | Performed by: OBSTETRICS & GYNECOLOGY

## 2024-11-12 PROCEDURE — 2580000003 HC RX 258: Performed by: EMERGENCY MEDICINE

## 2024-11-12 PROCEDURE — G8484 FLU IMMUNIZE NO ADMIN: HCPCS | Performed by: OBSTETRICS & GYNECOLOGY

## 2024-11-12 PROCEDURE — 85025 COMPLETE CBC W/AUTO DIFF WBC: CPT

## 2024-11-12 PROCEDURE — 2580000003 HC RX 258: Performed by: INTERNAL MEDICINE

## 2024-11-12 PROCEDURE — 51701 INSERT BLADDER CATHETER: CPT

## 2024-11-12 PROCEDURE — 1090F PRES/ABSN URINE INCON ASSESS: CPT | Performed by: OBSTETRICS & GYNECOLOGY

## 2024-11-12 PROCEDURE — 99202 OFFICE O/P NEW SF 15 MIN: CPT | Performed by: OBSTETRICS & GYNECOLOGY

## 2024-11-12 PROCEDURE — 1123F ACP DISCUSS/DSCN MKR DOCD: CPT | Performed by: OBSTETRICS & GYNECOLOGY

## 2024-11-12 PROCEDURE — 1111F DSCHRG MED/CURRENT MED MERGE: CPT | Performed by: OBSTETRICS & GYNECOLOGY

## 2024-11-12 PROCEDURE — 87086 URINE CULTURE/COLONY COUNT: CPT

## 2024-11-12 PROCEDURE — 80053 COMPREHEN METABOLIC PANEL: CPT

## 2024-11-12 PROCEDURE — 6370000000 HC RX 637 (ALT 250 FOR IP): Performed by: INTERNAL MEDICINE

## 2024-11-12 PROCEDURE — G8427 DOCREV CUR MEDS BY ELIG CLIN: HCPCS | Performed by: OBSTETRICS & GYNECOLOGY

## 2024-11-12 PROCEDURE — 88175 CYTOPATH C/V AUTO FLUID REDO: CPT

## 2024-11-12 PROCEDURE — 6360000002 HC RX W HCPCS: Performed by: EMERGENCY MEDICINE

## 2024-11-12 PROCEDURE — 94761 N-INVAS EAR/PLS OXIMETRY MLT: CPT

## 2024-11-12 PROCEDURE — 99285 EMERGENCY DEPT VISIT HI MDM: CPT

## 2024-11-12 PROCEDURE — 1200000000 HC SEMI PRIVATE

## 2024-11-12 PROCEDURE — G8417 CALC BMI ABV UP PARAM F/U: HCPCS | Performed by: OBSTETRICS & GYNECOLOGY

## 2024-11-12 PROCEDURE — 1036F TOBACCO NON-USER: CPT | Performed by: OBSTETRICS & GYNECOLOGY

## 2024-11-12 RX ORDER — ONDANSETRON 4 MG/1
4 TABLET, ORALLY DISINTEGRATING ORAL EVERY 8 HOURS PRN
Status: DISCONTINUED | OUTPATIENT
Start: 2024-11-12 | End: 2024-11-15 | Stop reason: HOSPADM

## 2024-11-12 RX ORDER — ONDANSETRON 2 MG/ML
4 INJECTION INTRAMUSCULAR; INTRAVENOUS EVERY 6 HOURS PRN
Status: DISCONTINUED | OUTPATIENT
Start: 2024-11-12 | End: 2024-11-15 | Stop reason: HOSPADM

## 2024-11-12 RX ORDER — SODIUM CHLORIDE 0.9 % (FLUSH) 0.9 %
5-40 SYRINGE (ML) INJECTION PRN
Status: DISCONTINUED | OUTPATIENT
Start: 2024-11-12 | End: 2024-11-15 | Stop reason: HOSPADM

## 2024-11-12 RX ORDER — ACETAMINOPHEN 325 MG/1
650 TABLET ORAL EVERY 6 HOURS PRN
Status: DISCONTINUED | OUTPATIENT
Start: 2024-11-12 | End: 2024-11-15 | Stop reason: HOSPADM

## 2024-11-12 RX ORDER — SODIUM BICARBONATE 650 MG/1
650 TABLET ORAL 3 TIMES DAILY
Status: DISCONTINUED | OUTPATIENT
Start: 2024-11-12 | End: 2024-11-13

## 2024-11-12 RX ORDER — ACETAMINOPHEN 650 MG/1
650 SUPPOSITORY RECTAL EVERY 6 HOURS PRN
Status: DISCONTINUED | OUTPATIENT
Start: 2024-11-12 | End: 2024-11-15 | Stop reason: HOSPADM

## 2024-11-12 RX ORDER — LACTOBACILLUS RHAMNOSUS GG 10B CELL
1 CAPSULE ORAL
Status: DISCONTINUED | OUTPATIENT
Start: 2024-11-13 | End: 2024-11-15 | Stop reason: HOSPADM

## 2024-11-12 RX ORDER — ATORVASTATIN CALCIUM 10 MG/1
10 TABLET, FILM COATED ORAL DAILY
Status: DISCONTINUED | OUTPATIENT
Start: 2024-11-13 | End: 2024-11-15 | Stop reason: HOSPADM

## 2024-11-12 RX ORDER — POLYETHYLENE GLYCOL 3350 17 G/17G
17 POWDER, FOR SOLUTION ORAL DAILY PRN
Status: DISCONTINUED | OUTPATIENT
Start: 2024-11-12 | End: 2024-11-15 | Stop reason: HOSPADM

## 2024-11-12 RX ORDER — ACYCLOVIR 800 MG/1
TABLET ORAL
Status: ON HOLD | COMMUNITY
Start: 2024-11-07 | End: 2024-11-14 | Stop reason: HOSPADM

## 2024-11-12 RX ORDER — VITAMIN B COMPLEX
2000 TABLET ORAL DAILY
Status: DISCONTINUED | OUTPATIENT
Start: 2024-11-13 | End: 2024-11-15 | Stop reason: HOSPADM

## 2024-11-12 RX ORDER — SODIUM CHLORIDE 9 MG/ML
INJECTION, SOLUTION INTRAVENOUS PRN
Status: DISCONTINUED | OUTPATIENT
Start: 2024-11-12 | End: 2024-11-15 | Stop reason: HOSPADM

## 2024-11-12 RX ORDER — PANTOPRAZOLE SODIUM 40 MG/1
40 TABLET, DELAYED RELEASE ORAL
Status: DISCONTINUED | OUTPATIENT
Start: 2024-11-13 | End: 2024-11-15 | Stop reason: HOSPADM

## 2024-11-12 RX ORDER — SODIUM CHLORIDE 0.9 % (FLUSH) 0.9 %
5-40 SYRINGE (ML) INJECTION EVERY 12 HOURS SCHEDULED
Status: DISCONTINUED | OUTPATIENT
Start: 2024-11-12 | End: 2024-11-15 | Stop reason: HOSPADM

## 2024-11-12 RX ORDER — ASPIRIN 81 MG/1
81 TABLET ORAL 2 TIMES DAILY
Status: DISCONTINUED | OUTPATIENT
Start: 2024-11-12 | End: 2024-11-15 | Stop reason: HOSPADM

## 2024-11-12 RX ADMIN — ASPIRIN 81 MG: 81 TABLET, COATED ORAL at 22:35

## 2024-11-12 RX ADMIN — SODIUM BICARBONATE 650 MG: 650 TABLET ORAL at 22:35

## 2024-11-12 RX ADMIN — SODIUM CHLORIDE, PRESERVATIVE FREE 10 ML: 5 INJECTION INTRAVENOUS at 22:35

## 2024-11-12 RX ADMIN — POTASSIUM BICARBONATE 20 MEQ: 782 TABLET, EFFERVESCENT ORAL at 22:35

## 2024-11-12 RX ADMIN — WATER 1000 MG: 1 INJECTION INTRAMUSCULAR; INTRAVENOUS; SUBCUTANEOUS at 19:45

## 2024-11-12 ASSESSMENT — PAIN - FUNCTIONAL ASSESSMENT: PAIN_FUNCTIONAL_ASSESSMENT: NONE - DENIES PAIN

## 2024-11-12 NOTE — PROGRESS NOTES
PROBLEM VISIT     Date of service: 2024    Tawanna Antonio  Is a 89 y.o.  female    PT's PCP is: Bryson Ardon MD     : 10/20/1931                                             Subjective:       No LMP recorded. Patient has had a hysterectomy.     OB History    Para Term  AB Living   3 3 3     3   SAB IAB Ectopic Molar Multiple Live Births             3      # Outcome Date GA Lbr Jordan/2nd Weight Sex Type Anes PTL Lv   3 Term      Vag-Spont   NATY   2 Term      Vag-Spont   NATY   1 Term      Vag-Spont   NATY        Social History     Tobacco Use   Smoking Status Former    Current packs/day: 0.00    Types: Cigarettes    Quit date: 10/11/1977    Years since quittin.1   Smokeless Tobacco Never        Social History     Substance and Sexual Activity   Alcohol Use No       Social History     Substance and Sexual Activity   Sexual Activity Not on file       Allergies: Patient has no known allergies.    Chief Complaint   Patient presents with    New Patient     Pt is here today as new patient due to vaginal bleeding. Pt has no vaginal bleeding today. Pt was bleeding aprox 2 weeks prior to . There was blood in urine, pt did see kidney Dr. Stage 4 polycystic kidney disease was diagnosed by kidney Dr. Pt daughter in law is here today with pt speaking on behalf.     Other     Pt does not believe she had hysterectomy.        Last Yearly:  unknown    Last pap: unknown    Last HPV: unknown      NURSE: Tyrell PERKINS    PE:  Vital Signs  Blood pressure 116/62, height 1.588 m (5' 2.5\"), weight 63.5 kg (140 lb).     Labs:    No results found for this visit on 24.        HPI: The patient is here with a history of heavy postmenopausal bleeding.  Of significance she does have a history of having hysterectomy done several years ago in the 70s.  Her caregiver whom is her daughter-in-law has cleaned up a large amount of blood and does not believe it is coming from the rectum.  She believes it is

## 2024-11-13 ENCOUNTER — APPOINTMENT (OUTPATIENT)
Dept: CT IMAGING | Age: 89
DRG: 690 | End: 2024-11-13
Payer: MEDICARE

## 2024-11-13 LAB
ABO/RH: NORMAL
ANION GAP SERPL CALCULATED.3IONS-SCNC: 7 MMOL/L (ref 9–17)
ANTIBODY SCREEN: NEGATIVE
ARM BAND NUMBER: NORMAL
ARM BAND NUMBER: NORMAL
BASOPHILS # BLD: 0.03 K/UL (ref 0–0.2)
BASOPHILS NFR BLD: 0 % (ref 0–2)
BLOOD BANK DISPENSE STATUS: NORMAL
BLOOD BANK SAMPLE EXPIRATION: NORMAL
BPU ID: NORMAL
BUN SERPL-MCNC: 21 MG/DL (ref 8–23)
BUN/CREAT SERPL: 11 (ref 9–20)
CALCIUM SERPL-MCNC: 8.3 MG/DL (ref 8.6–10.4)
CHLORIDE SERPL-SCNC: 101 MMOL/L (ref 98–107)
CO2 SERPL-SCNC: 28 MMOL/L (ref 20–31)
COMPONENT: NORMAL
CREAT SERPL-MCNC: 2 MG/DL (ref 0.5–0.9)
CROSSMATCH RESULT: NORMAL
EOSINOPHIL # BLD: 0.18 K/UL (ref 0–0.4)
EOSINOPHILS RELATIVE PERCENT: 2 % (ref 0–5)
ERYTHROCYTE [DISTWIDTH] IN BLOOD BY AUTOMATED COUNT: 17.8 % (ref 12.1–15.2)
FERRITIN SERPL-MCNC: 90 NG/ML
FOLATE SERPL-MCNC: 6.6 NG/ML (ref 4.8–24.2)
GFR, ESTIMATED: 23 ML/MIN/1.73M2
GLUCOSE SERPL-MCNC: 100 MG/DL (ref 70–99)
HCT VFR BLD AUTO: 20.1 % (ref 36–46)
HEMOCCULT STL QL: NORMAL
HGB BLD-MCNC: 6.5 G/DL (ref 12–16)
IMM GRANULOCYTES # BLD AUTO: 0.02 K/UL (ref 0–0.3)
IMM GRANULOCYTES NFR BLD: 0 % (ref 0–5)
IMM RETICS NFR: 14.8 % (ref 2.7–18.3)
IRON SATN MFR SERPL: 13 % (ref 20–55)
IRON SERPL-MCNC: 18 UG/DL (ref 37–145)
LYMPHOCYTES NFR BLD: 1.48 K/UL (ref 1–4.8)
LYMPHOCYTES RELATIVE PERCENT: 16 % (ref 15–40)
MAGNESIUM SERPL-MCNC: 1.5 MG/DL (ref 1.6–2.6)
MCH RBC QN AUTO: 27 PG (ref 26–34)
MCHC RBC AUTO-ENTMCNC: 32.3 G/DL (ref 31–37)
MCV RBC AUTO: 83.4 FL (ref 80–100)
MONOCYTES NFR BLD: 0.51 K/UL (ref 0–1)
MONOCYTES NFR BLD: 5 % (ref 4–8)
NEUTROPHILS NFR BLD: 77 % (ref 47–75)
NEUTS SEG NFR BLD: 7.19 K/UL (ref 2.5–7)
PLATELET # BLD AUTO: 262 K/UL (ref 140–450)
PMV BLD AUTO: 8.8 FL (ref 6–12)
POTASSIUM SERPL-SCNC: 3.3 MMOL/L (ref 3.7–5.3)
RBC # BLD AUTO: 2.41 M/UL (ref 4–5.2)
RETIC HEMOGLOBIN: 32.6 PG (ref 28.2–35.7)
RETICS # AUTO: 0.06 M/UL (ref 0.03–0.08)
RETICS/RBC NFR AUTO: 2.6 % (ref 0.5–1.9)
SODIUM SERPL-SCNC: 136 MMOL/L (ref 135–144)
TIBC SERPL-MCNC: 135 UG/DL (ref 250–450)
TRANSFUSION STATUS: NORMAL
UNIT DIVISION: 0
UNSATURATED IRON BINDING CAPACITY: 117 UG/DL (ref 112–347)
VIT B12 SERPL-MCNC: 500 PG/ML (ref 232–1245)
WBC OTHER # BLD: 9.4 K/UL (ref 3.5–11)

## 2024-11-13 PROCEDURE — 1200000000 HC SEMI PRIVATE

## 2024-11-13 PROCEDURE — 80048 BASIC METABOLIC PNL TOTAL CA: CPT

## 2024-11-13 PROCEDURE — 2580000003 HC RX 258: Performed by: INTERNAL MEDICINE

## 2024-11-13 PROCEDURE — 83735 ASSAY OF MAGNESIUM: CPT

## 2024-11-13 PROCEDURE — 6370000000 HC RX 637 (ALT 250 FOR IP): Performed by: INTERNAL MEDICINE

## 2024-11-13 PROCEDURE — 36415 COLL VENOUS BLD VENIPUNCTURE: CPT

## 2024-11-13 PROCEDURE — 86901 BLOOD TYPING SEROLOGIC RH(D): CPT

## 2024-11-13 PROCEDURE — 74176 CT ABD & PELVIS W/O CONTRAST: CPT

## 2024-11-13 PROCEDURE — 86900 BLOOD TYPING SEROLOGIC ABO: CPT

## 2024-11-13 PROCEDURE — 85045 AUTOMATED RETICULOCYTE COUNT: CPT

## 2024-11-13 PROCEDURE — 97166 OT EVAL MOD COMPLEX 45 MIN: CPT

## 2024-11-13 PROCEDURE — 82728 ASSAY OF FERRITIN: CPT

## 2024-11-13 PROCEDURE — 82746 ASSAY OF FOLIC ACID SERUM: CPT

## 2024-11-13 PROCEDURE — 36430 TRANSFUSION BLD/BLD COMPNT: CPT

## 2024-11-13 PROCEDURE — 94761 N-INVAS EAR/PLS OXIMETRY MLT: CPT

## 2024-11-13 PROCEDURE — 85025 COMPLETE CBC W/AUTO DIFF WBC: CPT

## 2024-11-13 PROCEDURE — 97162 PT EVAL MOD COMPLEX 30 MIN: CPT

## 2024-11-13 PROCEDURE — 83550 IRON BINDING TEST: CPT

## 2024-11-13 PROCEDURE — P9016 RBC LEUKOCYTES REDUCED: HCPCS

## 2024-11-13 PROCEDURE — 83540 ASSAY OF IRON: CPT

## 2024-11-13 PROCEDURE — 6360000002 HC RX W HCPCS: Performed by: INTERNAL MEDICINE

## 2024-11-13 PROCEDURE — 82607 VITAMIN B-12: CPT

## 2024-11-13 PROCEDURE — 86920 COMPATIBILITY TEST SPIN: CPT

## 2024-11-13 PROCEDURE — 86850 RBC ANTIBODY SCREEN: CPT

## 2024-11-13 RX ORDER — LANOLIN ALCOHOL/MO/W.PET/CERES
400 CREAM (GRAM) TOPICAL DAILY
Status: DISCONTINUED | OUTPATIENT
Start: 2024-11-13 | End: 2024-11-15 | Stop reason: HOSPADM

## 2024-11-13 RX ORDER — SODIUM CHLORIDE 9 MG/ML
INJECTION, SOLUTION INTRAVENOUS PRN
Status: DISCONTINUED | OUTPATIENT
Start: 2024-11-13 | End: 2024-11-15 | Stop reason: HOSPADM

## 2024-11-13 RX ORDER — SODIUM BICARBONATE 650 MG/1
650 TABLET ORAL 2 TIMES DAILY
Status: DISCONTINUED | OUTPATIENT
Start: 2024-11-13 | End: 2024-11-15 | Stop reason: HOSPADM

## 2024-11-13 RX ORDER — POTASSIUM CHLORIDE 750 MG/1
40 TABLET, EXTENDED RELEASE ORAL ONCE
Status: COMPLETED | OUTPATIENT
Start: 2024-11-13 | End: 2024-11-13

## 2024-11-13 RX ORDER — ESCITALOPRAM OXALATE 10 MG/1
10 TABLET ORAL DAILY
Status: DISCONTINUED | OUTPATIENT
Start: 2024-11-13 | End: 2024-11-15 | Stop reason: HOSPADM

## 2024-11-13 RX ORDER — SUCRALFATE 1 G/1
1 TABLET ORAL EVERY 8 HOURS SCHEDULED
Status: DISCONTINUED | OUTPATIENT
Start: 2024-11-13 | End: 2024-11-15 | Stop reason: HOSPADM

## 2024-11-13 RX ADMIN — SODIUM BICARBONATE 650 MG: 650 TABLET ORAL at 21:37

## 2024-11-13 RX ADMIN — ATORVASTATIN CALCIUM 10 MG: 10 TABLET, FILM COATED ORAL at 10:01

## 2024-11-13 RX ADMIN — ESCITALOPRAM OXALATE 10 MG: 10 TABLET ORAL at 10:01

## 2024-11-13 RX ADMIN — SODIUM CHLORIDE, PRESERVATIVE FREE 10 ML: 5 INJECTION INTRAVENOUS at 10:00

## 2024-11-13 RX ADMIN — SUCRALFATE 1 G: 1 TABLET ORAL at 06:30

## 2024-11-13 RX ADMIN — Medication 1 CAPSULE: at 10:00

## 2024-11-13 RX ADMIN — CHOLECALCIFEROL TAB 25 MCG (1000 UNIT) 2000 UNITS: 25 TAB at 10:00

## 2024-11-13 RX ADMIN — Medication 400 MG: at 06:27

## 2024-11-13 RX ADMIN — SODIUM CHLORIDE, PRESERVATIVE FREE 10 ML: 5 INJECTION INTRAVENOUS at 21:37

## 2024-11-13 RX ADMIN — SODIUM BICARBONATE 650 MG: 650 TABLET ORAL at 10:00

## 2024-11-13 RX ADMIN — POTASSIUM CHLORIDE 40 MEQ: 750 TABLET, FILM COATED, EXTENDED RELEASE ORAL at 06:26

## 2024-11-13 RX ADMIN — SUCRALFATE 1 G: 1 TABLET ORAL at 15:34

## 2024-11-13 RX ADMIN — PANTOPRAZOLE SODIUM 40 MG: 40 TABLET, DELAYED RELEASE ORAL at 06:26

## 2024-11-13 RX ADMIN — WATER 1000 MG: 1 INJECTION INTRAMUSCULAR; INTRAVENOUS; SUBCUTANEOUS at 21:37

## 2024-11-13 RX ADMIN — SUCRALFATE 1 G: 1 TABLET ORAL at 21:37

## 2024-11-13 ASSESSMENT — PAIN - FUNCTIONAL ASSESSMENT: PAIN_FUNCTIONAL_ASSESSMENT: NONE - DENIES PAIN

## 2024-11-13 NOTE — H&P
treatment:  -    Shared decision making:  -    Code status and discussions:  DNR CC     Medical Necessity:  In patient is appropriate for this patient secondary to the need for IV antibiotics and start of therapy.     Estimated length of stay: 3 days.      The beneficiary may reasonably be expected to be discharged or transferred to a hospital within 96 hours after admission.      Patient Active Problem List   Diagnosis Code    Vitamin D deficiency E55.9    Osteoporosis, senile M81.0    Mixed hypercholesterolemia and hypertriglyceridemia E78.2    H/O malignant neoplasm of colon Z85.038    GERD (gastroesophageal reflux disease) K21.9    CAD (coronary artery disease) I25.10    Benign essential HTN I10    Osteoarthrosis M19.90    Controlled type 2 diabetes mellitus with stage 2 chronic kidney disease, without long-term current use of insulin ([facility]) E11.22, N18.2    Major depressive disorder with single episode, in full remission ([facility]) F32.5    Chronic kidney disease, stage IV (severe) ([facility]) N18.4    Dupuytren's contracture of right hand M72.0    Impaired cognitive ability R41.89    Sepsis secondary to UTI ([facility]) A41.9, N39.0    Moderate malnutrition ([facility]) E44.0    Anemia D64.9    Major depressive disorder F32.9    Epidermoid cyst L72.0    Epidermoid cyst of face L72.0    Facial mass R22.0    Gastric ulcer K25.9    Hypertension I10    Multiple gastric erosions K25.9    UTI (urinary tract infection) N39.0       DVT prophylaxis:   [] Lovenox   [x] SCDs   [] SQ Heparin   [] Encourage ambulation, low risk for DVT, no chemical or mechanical    prophylaxis necessary      [] Already on Anticoagulation      Documentation of the Current Medications in the Medical Record    (x)  I have utilized all available immediate resources to obtain, update, or review the patient's current medications (including all prescriptions, over-the-counter products, herbals, cannabis / cannabidiol products, vitamin / mineral / dietary (nutritional)  supplements).  (Satisfies MIPS Performance)  If Yes, Stop Here  ( )  The patient is not eligible for medication reconciliation; the patient is in an emergent medical situation where delaying treatment would jeopardize the patient's health.  (MIPS Performance exception / exclusion)  ( )  I did not confirm, update or review the patient's current list of medications today.  (Does not satisfy MIPS Performance)          Advanced Care Plan    (x)  I confirmed that the patient's Advanced Care Plan is present, code status documented, or surrogate decision maker is listed in the patient's medical record.  ( )  The patient's advanced care plan is not present because:  (select)   ( ) I confirmed today that the patient does not wish or was not able to name a surrogate decision maker or provide an Advance Care Plan.   ( ) Hospice care is currently being provided or has been provided this calender year.  ( )  I did not confirm today the presence of an Advance Care Plan or surrogate decision maker documented within the patient's medical record. (Does not satisfy MIPS performance).            Bryson Ardon MD, MD  Admitting Hospitalist

## 2024-11-13 NOTE — PROGRESS NOTES
RN case manager and LOLITA attempted to meet with pt to complete assessment. Pt could answer a few questions but then deferred to Deandre to answer anything else. Pt is very hard of hearing. LOLITA and RN case manager called and left a message for Deandre and he then called back. Pt lives alone in her condo in [place]. Pt was just released from the [facility] last Thursday 11/7/2024. Pt has a walker to use at home but she did not have any services coming to see her. Deandre thought that HH was supposed to start but then pt came back to the hospital. Pt's dtr in law April has been going over to assist with pt at home. Pt relies on family to assist her with transportation.     Pt is a DNR CC and follows with Dr Ardon as PCP. Pt has advance directives on file which she reports are correct. ACP note completed with pt's son. Pt medications are covered well by insurance. Discussed with Deandre plans for discharge and options available to them including return to [facility], going home with HH or just going home and private duty to assist as well. Deandre states that it is not going to be safe for pt to return home but he wants to talk with his brother Saravanan before making a final decision.     LOLITA received a call from pt's dtr in law April (Saravanan's wife) and Saravanan is currently in the ER so she asks that I talk with her about the options. LOLITA reviewed with April and she also brings up the possibility of hospice. LOLITA and April have a long conversation about coverage in a nursing home with hospice. April expresses understanding and states that they would prefer to have her go to [facility] and use her SNF benefit for as long as she is able and then transfer her to long term care with hospice. April feels this would give them time to get her finances in place to be able to afford this for her.     Pt's son Deandre calls back after LOLITA spoke with April and LOLITA discusses with him about SNF care vs hospice and financial implications involved with this as well as

## 2024-11-13 NOTE — CONSENT
Informed Consent for Blood Component Transfusion Note    I have discussed with the patient the rationale for blood component transfusion; its benefits in treating or preventing fatigue, organ damage, or death; and its risk which includes mild transfusion reactions, rare risk of blood borne infection, or more serious but rare reactions. I have discussed the alternatives to transfusion, including the risk and consequences of not receiving transfusion. The patient had an opportunity to ask questions and had agreed to proceed with transfusion of blood components.    Electronically signed by Bryson Ardon MD on 11/13/24 at 5:58 AM EST

## 2024-11-13 NOTE — DISCHARGE INSTR - COC
Continuity of Care Form    Patient Name: Tawanna Antonio   :  10/20/1931  MRN:  [id number]    Admit date:  2024  Discharge date:  11/15/24    Code Status Order: DNR-CC   Advance Directives:   Advance Care Flowsheet Documentation             Admitting Physician:  Bryson Ardon MD  PCP: Bryson Ardon MD    Discharging Nurse: EDILMA Treviño  Discharging Hospital Unit/Room#: 0258/0258-01  Discharging Unit Phone Number: [phone]    Emergency Contact:   Extended Emergency Contact Information  Primary Emergency Contact: Deandre Costello  Address: *  Mobile Phone: [phone]  Relation: Child  Secondary Emergency Contact: [place]  Mobile Phone: [phone]  Relation: Daughter-in-Law    Past Surgical History:  Past Surgical History:   Procedure Laterality Date    COLONOSCOPY  , 12    COLONOSCOPY  07/10/2017    Dr. Ardon:  3 adenomatous polyps.    DIAGNOSTIC CARDIAC CATH LAB PROCEDURE  08    HEMICOLECTOMY  10/02    HYSTERECTOMY (CERVIX STATUS UNKNOWN)      KNEE SURGERY      AR COLON CA SCRN NOT HI RSK IND N/A 7/10/2017    COLONOSCOPY performed by Bryson Ardon MD at [facility] OR       Immunization History:   Immunization History   Administered Date(s) Administered    COVID-19, MODERNA BLUE border, Primary or Immunocompromised, (age 12y+), IM, 100 mcg/0.5mL 2021, 2021, 2021    Influenza 2011, 10/30/2013    Influenza Virus Vaccine 10/30/2013, 2014, 2015    Influenza, FLUARIX, FLULAVAL, FLUZONE (age 6 mo+) and AFLURIA, (age 3 y+), Quadv PF, 0.5mL 2017, 2018, 2019, 2019, 2020    Influenza, FLUCELVAX, (age 6 mo+), MDCK, Quadv PF, 0.5mL 2021, 2022    Influenza, FLUZONE High Dose, (age 65 y+), IM, Trivalent PF, 0.5mL 10/11/2012    Pneumococcal, PCV-13, PREVNAR 13, (age 6w+), IM, 0.5mL 2016    Pneumococcal, PPSV23, PNEUMOVAX 23, (age 2y+), SC/IM, 0.5mL 2017, 2018       Active Problems:  Patient Active Problem List   Diagnosis  11/13/2024 1334  Last data filed at 11/13/2024 1105  Gross per 24 hour   Intake 441.93 ml   Output 100 ml   Net 341.93 ml     I/O last 3 completed shifts:  In: -   Out: 100 [Urine:100]    Safety Concerns:     At Risk for Falls    Impairments/Disabilities:      Vision, Hearing    Nutrition Therapy:  Current Nutrition Therapy:   - Oral Diet:  General    Routes of Feeding: Oral  Liquids: Thin Liquids  Daily Fluid Restriction: no  Last Modified Barium Swallow with Video (Video Swallowing Test): not done    Treatments at the Time of Hospital Discharge:   Respiratory Treatments: ***  Oxygen Therapy:  is not on home oxygen therapy.  Ventilator:    - No ventilator support    Rehab Therapies: Physical Therapy and Occupational Therapy  Weight Bearing Status/Restrictions: No weight bearing restrictions  Other Medical Equipment (for information only, NOT a DME order):  walker  Other Treatments: ***    Patient's personal belongings (please select all that are sent with patient):  Glasses, Hearing Aides right, Dentures upper    RN SIGNATURE:  Electronically signed by Luis Ferguson RN on 11/15/24 at 9:31 AM EST    CASE MANAGEMENT/SOCIAL WORK SECTION    Inpatient Status Date: 11/12/2024    Readmission Risk Assessment Score:  Readmission Risk              Risk of Unplanned Readmission:  20           Discharging to Facility/ Agency   Name: [facility]  Address: [address] [place] [address]  Phone: [phone]  Fax:[phone]    Dialysis Facility (if applicable)   Name:  Address:  Dialysis Schedule:  Phone:  Fax:    / signature: Electronically signed by TANVIR Frazier, LSW on 11/13/24 at 1:35 PM EST    PHYSICIAN SECTION    Prognosis: Good    Condition at Discharge: Stable    Rehab Potential (if transferring to Rehab): Good    Recommended Labs or Other Treatments After Discharge: PT / OT daily.    Physician Certification: I certify the above information and transfer of Tawanna LI

## 2024-11-13 NOTE — ED PROVIDER NOTES
EMERGENCY DEPARTMENT ENCOUNTER      CHIEF COMPLAINT    Chief Complaint   Patient presents with    Extremity Weakness     Weakness since she has been home from [facility]    Tawanna Antonio is a 89 y.o. female who presentsto ED with strong smell of urine.  Patient has history of dementia.  Patient has been urine incontinent.  Patient lives at home.  Patient has prior history of diabetes and colon cancer.  Patient has vaginal bleed.  Patient was discharged from the extended care facility \"[facility] on Thursday, 5 days ago.  Patient has not been able to take care of herself at home.  Patient has been declining  PAST MEDICAL HISTORY    Past Medical History:   Diagnosis Date    Cancer (HCC)     colon    Colon cancer (HCC) 10/01/2002    Diabetes mellitus (HCC)     Esophageal reflux     Heart disease     Hypercholesterolemia     Hypertension     Microalbuminuria     Osteoarthrosis     Osteoporosis     Polycystic kidney disease        SURGICAL HISTORY    Past Surgical History:   Procedure Laterality Date    COLONOSCOPY  , 12    COLONOSCOPY  07/10/2017    Dr. Ardon:  3 adenomatous polyps.    DIAGNOSTIC CARDIAC CATH LAB PROCEDURE  08    HEMICOLECTOMY  10/02    HYSTERECTOMY (CERVIX STATUS UNKNOWN)      KNEE SURGERY      AR COLON CA SCRN NOT HI RSK IND N/A 7/10/2017    COLONOSCOPY performed by Bryson Ardon MD at [facility] OR       CURRENT MEDICATIONS          ALLERGIES    No Known Allergies    FAMILY HISTORY    Family History   Problem Relation Age of Onset    Heart Attack Brother        SOCIAL HISTORY    Social History     Socioeconomic History    Marital status:      Spouse name: None    Number of children: None    Years of education: None    Highest education level: None   Occupational History     Employer: UNEMPLOYED   Tobacco Use    Smoking status: Former     Current packs/day: 0.00     Types: Cigarettes     Quit date: 10/11/1977     Years since quittin.1    Smokeless tobacco: Never   Vaping

## 2024-11-13 NOTE — PROGRESS NOTES
Spiritual Services Interventions  0258/0258-01   11/13/2024  Fuad Antonio  89 y.o. year old female    Encounter Summary  Encounter Overview/Reason: (P) Initial Encounter  Service Provided For: (P) Patient  Referral/Consult From: (P) Rounding  Last Encounter : (P) 11/13/24  Complexity of Encounter: (P) Moderate  Begin Time: (P) 0830  End Time : (P) 0845  Total Time Calculated: (P) 15 min     Spiritual/Emotional needs  Type: (P) Spiritual Support                    Assessment/Intervention/Outcome  Assessment: (P) Calm  Intervention: (P) Discussed belief system/Church practices/derek, Discussed illness injury and it’s impact  Outcome: (P) Encouraged, Engaged in conversation

## 2024-11-13 NOTE — CARE COORDINATION
Case Management Assessment  Initial Evaluation    Date/Time of Evaluation: 11/13/2024 1:41 PM  Assessment Completed by: Nirmal Nieves RN    If patient is discharged prior to next notation, then this note serves as note for discharge by case management.    Patient Name: Tawanna Antonio                   YOB: 1931  Diagnosis: UTI (urinary tract infection) [N39.0]  Generalized weakness [R53.1]  History of dementia [Z86.59]  Urinary tract infection without hematuria, site unspecified [N39.0]                   Date / Time: 11/12/2024  6:50 PM    Patient Admission Status: Inpatient   Readmission Risk (Low < 19, Mod (19-27), High > 27): Readmission Risk Score: 21.9    Current PCP: Bryson Ardon MD  PCP verified by CM? (P) Yes (Dr Ardon)    Chart Reviewed: Yes      History Provided by: (P) Child/Family, Medical Record (Carmella)  Patient Orientation: (P) Self    Patient Cognition: (P) Short Term Memory Deficit    Hospitalization in the last 30 days (Readmission):  No    If yes, Readmission Assessment in  Navigator will be completed.    Advance Directives:      Code Status: DNR-CC   Patient's Primary Decision Maker is: (P) Legal Next of Kin    Primary Decision Maker: Deandre Costello - Child - [phone]    Secondary Decision Maker: Hayes Antonio - Rock Child - [phone]    Discharge Planning:    Patient lives with: (P) Alone Type of Home: (P) House  Primary Care Giver: (P) Family  Patient Support Systems include: (P) Children, Family Members   Current Financial resources: (P) Medicare  Current community resources: (P) None  Current services prior to admission: (P) Home Care (per Carmella, \"had not started services yet\")            Current DME:              Type of Home Care services:  (P) None    ADLS  Prior functional level: (P) Assistance with the following:, Shopping, Housework, Mobility, Cooking  Current functional level: (P) Assistance with the following:, Bathing, Dressing, Toileting, Cooking,

## 2024-11-13 NOTE — THERAPY EVALUATION
[facility]  Physical Therapy  Evaluation  Date: 2024  Patient Name: Tawanna Antonio        MRN: [id number]    : 10/20/1931  (89 y.o.)  Gender: female   Referring Practitioner: Dr. Ardon  Diagnosis: UTI  Additional Pertinent Hx: Admitted with progressive weakness and confusion related to UTI.   Patient recently discharged home from [facility] and became progressively weaker.  Referred to PT to assess functional mobility and strength   Past Medical History:   Diagnosis Date    Cancer (HCC)     colon    Colon cancer (HCC) 10/01/2002    Diabetes mellitus (HCC)     Esophageal reflux     Heart disease     Hypercholesterolemia     Hypertension     Microalbuminuria     Osteoarthrosis     Osteoporosis     Polycystic kidney disease      Past Surgical History:   Procedure Laterality Date    COLONOSCOPY  , 12    COLONOSCOPY  07/10/2017    Dr. Ardon:  3 adenomatous polyps.    DIAGNOSTIC CARDIAC CATH LAB PROCEDURE  08    HEMICOLECTOMY  10/02    HYSTERECTOMY (CERVIX STATUS UNKNOWN)      KNEE SURGERY      [place] COLON CA SCRN NOT HI RSK IND N/A 7/10/2017    COLONOSCOPY performed by Bryson Ardon MD at [facility] OR       Restrictions         Subjective              Orientation  Overall Orientation Status: Within Functional Limits    Home Living / Prior Level of Function  Social/Functional History  Lives With: Alone  Type of Home: Condo  Bathroom Equipment: Grab bars in shower  Home Equipment: Walker - Rolling  Receives Help From: Family  ADL Assistance: Needs assistance  Homemaking Assistance: Needs assistance  Ambulation Assistance: Independent (wh walker)  Transfer Assistance: Independent  Active : No  Patient's  Info: family is able to provide  Mode of Transportation: Car  Occupation: Retired  Additional Comments: Patient states she has 2 sons living locally who check on her frequently every day    Objective                       Strength RLE  Strength RLE: WFL  Comment: Generally 4/5  Strength  LLE  Strength LLE: WFL  Comment: Generally 4/5             Bed mobility  Supine to Sit: Stand by assistance  Supine to Sit: Supervision  Sit to Stand: Stand by assistance, Contact guard assistance  Stand to Sit: Stand by assistance, Contact guard assistance         Ambulated with wh walker 35ft with CGA     Balance  Sitting - Static: Good  Sitting - Dynamic: Good  Standing - Static: Good    Assessment  Activity Tolerance: Patient tolerated evaluation without incident   Assessment: Patient presents with generalized weakness associated with UTI.  Is able to complete basic  transfers with SB/CGA and amb with wh walker 35 ft with CGA.  Patient would benefit from short term PT to promote safety and independence.  She has family who lives locally and checks on her frequently however may benefit from additional in-home assistance upon discharge to monitor status and ensure safety.  Patient is very [place] which limits ability to communicate fully commands and education  Therapy Prognosis: Good  Discharge Recommendations: Continue to assess pending progress, Home with assist PRN     Type of Devices: Nurse notified, Left in chair, Gait belt, Chair alarm in place, Call light within reach     Plan  Frequency: 1-2x/day Daily M-F, 1x/day Sat-Sun    Duration: 5 days     Goals  Short Term Goals  Time Frame for Short Term Goals: 5 days - expires 11/17/24  Short Term Goal 1: Complete basic transfers in/OOB and chair/commode independently to safely return home  Short Term Goal 2: Ambulate with wh walker 75 ft with supervision to safely negotiate home environment  Short Term Goal 3: Good dynamic balance to complete self-care tasks    Long Term Goals  Long Term Goal 1: LTG=STG          Time In: 1110  Time Out: 1128  Timed Coded Minutes: 0  Total Treatment Time: 18     NISHA GONZALEZ PT,  11/13/2024

## 2024-11-13 NOTE — PROGRESS NOTES
Comprehensive Nutrition Assessment    Type and Reason for Visit:  Initial    Nutrition Recommendations/Plan:   Continue current diet.   Start 4 oz chocoalte or vanilla ensure enlive TID with meals.      Malnutrition Assessment:  Malnutrition Status:  Moderate malnutrition (11/13/24 0844)    Context:  Chronic Illness     Findings of the 6 clinical characteristics of malnutrition:  Energy Intake:  No decrease in energy intake  Weight Loss:  Greater than 10% over 6 months (12.7%)     Body Fat Loss:  Mild body fat loss Orbital, Fat Overlying Ribs   Muscle Mass Loss:  Mild muscle mass loss Clavicles (pectoralis & deltoids)  Fluid Accumulation:  No fluid accumulation     Strength:  Not Performed    Nutrition Assessment:    Moderate malnutrition (chronic) r/t inadequate protein/energy intakes aeb weight loss 12.7% x 6 months, mild fat/muscle losses. Altered nutrition related labs r/t impaired nutrient utilization aeb Hgb 6.6, transfused 1 U PRBC's. K+ 3.3, Cr 2.0, magnesium 1.5, calcium 8.3-corrected calcium 9.26. Pt agrees to try chocolate or vanilla ensure enlive with meals. Denied any meal ingestion concerns.    Nutrition Related Findings:    no bowel sound data. No edema. Wound Type: None       Current Nutrition Intake & Therapies:    Average Meal Intake: Unable to assess  Average Supplements Intake: None Ordered  ADULT DIET; Regular    Anthropometric Measures:  Height: 160 cm (5' 3\")  Ideal Body Weight (IBW): 115 lbs (52 kg)    Admission Body Weight: 66.1 kg (145 lb 10 oz)  Current Body Weight: 66 kg (145 lb 9.6 oz), 126.6 % IBW. Weight Source: Bed scale  Current BMI (kg/m2): 25.8  Usual Body Weight: 75.6 kg (166 lb 11.2 oz) (5/18/24)     % Weight Change (Calculated): -12.7  Weight Adjustment For: No Adjustment                 BMI Categories: Overweight (BMI 25.0-29.9)    Estimated Daily Nutrient Needs:  Energy Requirements Based On: Kcal/kg  Weight Used for Energy Requirements: Current  Energy (kcal/day):

## 2024-11-13 NOTE — THERAPY EVALUATION
[facility]  Phone: [phone]    Occupational Therapy Evaluation    Date: 2024  Patient Name: Tawanna Antonio        MRN: [id number]    : 10/20/1931  (89 y.o.)  Gender: female   Referring Practitioner: Dr. Ardon  Diagnosis: UTI  Additional Pertinent Hx: Admitted to [facility] on 2024 due to UTI. Referred to OT services to assess ability to care for self.    Past Medical History:   Diagnosis Date    Cancer (HCC)     colon    Colon cancer (HCC) 10/01/2002    Diabetes mellitus (HCC)     Esophageal reflux     Heart disease     Hypercholesterolemia     Hypertension     Microalbuminuria     Osteoarthrosis     Osteoporosis     Polycystic kidney disease      Past Surgical History:   Procedure Laterality Date    COLONOSCOPY  , 12    COLONOSCOPY  07/10/2017    Dr. Ardon:  3 adenomatous polyps.    DIAGNOSTIC CARDIAC CATH LAB PROCEDURE  08    HEMICOLECTOMY  10/02    HYSTERECTOMY (CERVIX STATUS UNKNOWN)      KNEE SURGERY      VT COLON CA SCRN NOT HI RSK IND N/A 7/10/2017    COLONOSCOPY performed by Bryson Ardon MD at [facility] OR     Subjective:     Subjective: Patient was lying supine in bed upon OT arrival. Was agreeable to OT evaluation.    Objective:     Social/Functional History:  Lives With: Alone  Type of Home: Condo  Bathroom Equipment: Grab bars in shower  Home Equipment: Walker - Rolling    PLOF:  Receives Help From: Family  ADL Assistance: Needs assistance  Homemaking Assistance: Needs assistance  Ambulation Assistance: Independent  Transfer Assistance: Independent    ADLs:  Feeding: Setup  Grooming: Contact guard assistance  UE Bathing: Stand by assistance  LE Bathing: Minimal assistance  UE Dressing: Stand by assistance  LE Dressing: Minimal assistance  Toileting: Contact guard assistance    Transfers:  Supine to Sit: Stand by assistance  Sit to stand: Contact guard assistance   Stand to sit: Contact guard assistance    Assessment:     Performance deficits /  Impairments: Decreased functional mobility , Decreased ADL status, Decreased strength, Decreased endurance, Decreased balance, Decreased vision/visual deficit, Decreased high-level IADLs, Decreased fine motor control, Decreased coordination, Decreased posture    Assessment: Patient was lying supine in bed upon OT arrival. Was agreeable to OT evaluation. Completed ADLs and functional transfers as documented above (based on clinical reasoning and observation). Performed functional mobility within hospital room and in hallway via FWW/gait belt with CGA. 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 Patient also required increased time/cueing to complete active opposition to each digit bilaterally despite demonstration from therapist. Is limited by acute on chronic anemia and difficulty hearing (despite hearing aid). Patient would benefit from OT services during hospitalization to increase independence and maximize safety with functional activities. Patient remains in recliner with call light/personal items within reach, RN present and chair alarm activated upon OT departure. Will continue to address goals and progress patient as able/tolerated.    Goals:     Short term goals:  Time Frame for Short Term Goals: 3 days (11/15/2024)  Short Term Goal 1: Patient will complete a commode transfer while using DME PRN with SUP/SETUP.  Short Term Goal 2: Patient will complete upper body dressing and/or bathing while using adaptive equipment/techniques PRN with SUP/SETUP.  Short Term Goal 3: Patient will complete lower body dressing and/or bathing while using adaptive equipment/techniques PRN with SBA.  Short Term Goal 4: To increase UE strength for ADLs and functional transfers, patient will engage in 10 minutes of BUE ther ex without a rest break.  Short Term Goal 5: To increase independence and safety with IADLs, patient will tolerate static/dynamic standing x5 minutes without

## 2024-11-14 ENCOUNTER — TELEPHONE (OUTPATIENT)
Dept: UROLOGY | Age: 89
End: 2024-11-14

## 2024-11-14 LAB
ANION GAP SERPL CALCULATED.3IONS-SCNC: 8 MMOL/L (ref 9–17)
BASOPHILS # BLD: 0.02 K/UL (ref 0–0.2)
BASOPHILS NFR BLD: 0 % (ref 0–2)
BUN SERPL-MCNC: 22 MG/DL (ref 8–23)
BUN/CREAT SERPL: 11 (ref 9–20)
CALCIUM SERPL-MCNC: 8.6 MG/DL (ref 8.6–10.4)
CHLORIDE SERPL-SCNC: 102 MMOL/L (ref 98–107)
CO2 SERPL-SCNC: 26 MMOL/L (ref 20–31)
CREAT SERPL-MCNC: 2 MG/DL (ref 0.5–0.9)
EOSINOPHIL # BLD: 0.2 K/UL (ref 0–0.4)
EOSINOPHILS RELATIVE PERCENT: 2 % (ref 0–5)
ERYTHROCYTE [DISTWIDTH] IN BLOOD BY AUTOMATED COUNT: 17.3 % (ref 12.1–15.2)
GFR, ESTIMATED: 23 ML/MIN/1.73M2
GLUCOSE SERPL-MCNC: 107 MG/DL (ref 70–99)
HCT VFR BLD AUTO: 24.4 % (ref 36–46)
HGB BLD-MCNC: 8 G/DL (ref 12–16)
IMM GRANULOCYTES # BLD AUTO: 0.02 K/UL (ref 0–0.3)
IMM GRANULOCYTES NFR BLD: 0 % (ref 0–5)
LYMPHOCYTES NFR BLD: 2.03 K/UL (ref 1–4.8)
LYMPHOCYTES RELATIVE PERCENT: 19 % (ref 15–40)
MAGNESIUM SERPL-MCNC: 1.6 MG/DL (ref 1.6–2.6)
MCH RBC QN AUTO: 27.5 PG (ref 26–34)
MCHC RBC AUTO-ENTMCNC: 32.8 G/DL (ref 31–37)
MCV RBC AUTO: 83.8 FL (ref 80–100)
MICROORGANISM SPEC CULT: NO GROWTH
MONOCYTES NFR BLD: 0.6 K/UL (ref 0–1)
MONOCYTES NFR BLD: 6 % (ref 4–8)
NEUTROPHILS NFR BLD: 73 % (ref 47–75)
NEUTS SEG NFR BLD: 7.73 K/UL (ref 2.5–7)
PLATELET # BLD AUTO: 256 K/UL (ref 140–450)
PMV BLD AUTO: 9.2 FL (ref 6–12)
POTASSIUM SERPL-SCNC: 3.7 MMOL/L (ref 3.7–5.3)
RBC # BLD AUTO: 2.91 M/UL (ref 4–5.2)
SODIUM SERPL-SCNC: 136 MMOL/L (ref 135–144)
SPECIMEN DESCRIPTION: NORMAL
WBC OTHER # BLD: 10.6 K/UL (ref 3.5–11)

## 2024-11-14 PROCEDURE — 2580000003 HC RX 258: Performed by: INTERNAL MEDICINE

## 2024-11-14 PROCEDURE — 6370000000 HC RX 637 (ALT 250 FOR IP): Performed by: INTERNAL MEDICINE

## 2024-11-14 PROCEDURE — 36415 COLL VENOUS BLD VENIPUNCTURE: CPT

## 2024-11-14 PROCEDURE — 6360000002 HC RX W HCPCS: Performed by: INTERNAL MEDICINE

## 2024-11-14 PROCEDURE — 94761 N-INVAS EAR/PLS OXIMETRY MLT: CPT

## 2024-11-14 PROCEDURE — 83735 ASSAY OF MAGNESIUM: CPT

## 2024-11-14 PROCEDURE — 85025 COMPLETE CBC W/AUTO DIFF WBC: CPT

## 2024-11-14 PROCEDURE — 80048 BASIC METABOLIC PNL TOTAL CA: CPT

## 2024-11-14 PROCEDURE — 97535 SELF CARE MNGMENT TRAINING: CPT

## 2024-11-14 PROCEDURE — 1200000000 HC SEMI PRIVATE

## 2024-11-14 RX ORDER — CEPHALEXIN 500 MG/1
500 CAPSULE ORAL 2 TIMES DAILY
DISCHARGE
Start: 2024-11-14 | End: 2024-11-21

## 2024-11-14 RX ORDER — SODIUM BICARBONATE 650 MG/1
650 TABLET ORAL 2 TIMES DAILY
DISCHARGE
Start: 2024-11-14

## 2024-11-14 RX ORDER — ESCITALOPRAM OXALATE 10 MG/1
10 TABLET ORAL DAILY
DISCHARGE
Start: 2024-11-14

## 2024-11-14 RX ORDER — LANOLIN ALCOHOL/MO/W.PET/CERES
200 CREAM (GRAM) TOPICAL DAILY
DISCHARGE
Start: 2024-11-14

## 2024-11-14 RX ADMIN — SODIUM CHLORIDE, PRESERVATIVE FREE 10 ML: 5 INJECTION INTRAVENOUS at 08:23

## 2024-11-14 RX ADMIN — Medication 400 MG: at 08:22

## 2024-11-14 RX ADMIN — PANTOPRAZOLE SODIUM 40 MG: 40 TABLET, DELAYED RELEASE ORAL at 06:24

## 2024-11-14 RX ADMIN — SUCRALFATE 1 G: 1 TABLET ORAL at 14:14

## 2024-11-14 RX ADMIN — ATORVASTATIN CALCIUM 10 MG: 10 TABLET, FILM COATED ORAL at 08:22

## 2024-11-14 RX ADMIN — SODIUM CHLORIDE, PRESERVATIVE FREE 10 ML: 5 INJECTION INTRAVENOUS at 21:07

## 2024-11-14 RX ADMIN — SUCRALFATE 1 G: 1 TABLET ORAL at 21:07

## 2024-11-14 RX ADMIN — SUCRALFATE 1 G: 1 TABLET ORAL at 06:24

## 2024-11-14 RX ADMIN — SODIUM BICARBONATE 650 MG: 650 TABLET ORAL at 08:22

## 2024-11-14 RX ADMIN — ESCITALOPRAM OXALATE 10 MG: 10 TABLET ORAL at 08:22

## 2024-11-14 RX ADMIN — SODIUM BICARBONATE 650 MG: 650 TABLET ORAL at 21:07

## 2024-11-14 RX ADMIN — Medication 1 CAPSULE: at 08:22

## 2024-11-14 RX ADMIN — CHOLECALCIFEROL TAB 25 MCG (1000 UNIT) 2000 UNITS: 25 TAB at 08:22

## 2024-11-14 RX ADMIN — WATER 1000 MG: 1 INJECTION INTRAMUSCULAR; INTRAVENOUS; SUBCUTANEOUS at 21:06

## 2024-11-14 ASSESSMENT — PAIN SCALES - GENERAL: PAINLEVEL_OUTOF10: 0

## 2024-11-14 NOTE — PROGRESS NOTES
[facility]  Phone: [phone]    Occupational Therapy Inpatient Daily Note  Date: 2024  Patient Name: Tawanna Antonio        MRN: [id number]    : 10/20/1931  (89 y.o.)    Subjective:     Subjective: Patient was sitting in recliner upon OT arrival. Was agreeable to OT interventions.    Pt is PROGRESSING toward goals and independence of Self Care this treatment session    Discharge recommendation: Continue to assess Pending Progress    Objective:     ADLs:  Grooming: Stand by assistance (washing face and applying deodorant in sitting)  UE Bathing: Stand by assistance (via sponge bath in sitting)  LE Bathing: Stand by assistance (via sponge bath in sitting/standing excluding gallito area)  UE Dressing: Stand by assistance (donning hospital gown in sitting)  LE Dressing: Stand by assistance (doffing/donning bilateral slipper socks in sitting)  Toileting: Contact guard assistance, Stand by assistance (SBA: gallito care in sitting; CGA: clothing management in standing)    Transfers:  Sit to stand: Contact guard assistance   Stand to sit: Stand by assistance  Toilet Transfer: Contact guard assistance (via toilet in bathroom with verbal cues for proper hand placement)    Assessment:     Assessment: Patient was sitting in recliner upon OT arrival. Was agreeable to OT interventions. Completed ADLs and functional transfers as documented above. Performed functional mobility to/from bathroom via FWW/gait belt with CGA. Required visual prompts from therapist for direction-following secondary to [place] and non-working hearing aid (due to dead battery). Overall good session, will continue to address goals and progress patient as able/tolerated. Patient remains in recliner with call light/personal items within reach and chair alarm activated upon OT departure. Provided update to RN.     Exercises:     See Flowsheets    Goals:     Short term goals:  Time Frame for Short Term Goals: 3 days (11/15/2024)  Short Term Goal

## 2024-11-14 NOTE — PROGRESS NOTES
Notified son of Patient discharge scheduled for Friday, 11/15/2024 at 1:30 p.m.  Questions answered to son's satisfaction.  Will assist with discharge planning as appropriate.    DARION Barr  11/14/2024

## 2024-11-14 NOTE — PROGRESS NOTES
Per , the [facility] will accept patient 11/15/2024 at 1:30pm.  made aware by this nurse via [employer].

## 2024-11-14 NOTE — PROGRESS NOTES
[facility]  Physical Therapy    Date: 2024  Patient Name: Tawanna Antonio        : 10/20/1931       [x] Pt Refusal Patient in chair upon arrival to room. She refuses to walk with PTA or to even complete seated exercises for B LE's. States her back is hurting and she would like to rest.  Will check back tomorrow and progress as able.  She remains in chair following.           [] Pt Unavailable due to:          Chante Ferraro, PTA Date: 2024

## 2024-11-14 NOTE — PROGRESS NOTES
RN phones [facility] to schedule outpatient follow up prior to her discharge to [facility]. Per , she will need to speak with provider to \"over ride\" due to lack of availability for her diagnosis.  states she will call the [facility] with the appt details. RN suggests her son Deandre also be notified of the appt details. She states \"that won't be a problem and verifies Deandre contact number.     Update provided on discharge AVS.

## 2024-11-14 NOTE — TELEPHONE ENCOUNTER
Patient is currently being discharged from [facility] today and transferred to the [facility] in [place].  The patient has a bladder mass per the CT completed yesterday.      The patient would like to be seen in [place], but is willing to be seen in [place] if that is the only option.      Please advise on when you would like the patient to be scheduled.

## 2024-11-14 NOTE — PROGRESS NOTES
Update provided to April, daughter-in-law per request. All questions answered to the best of my ability.

## 2024-11-14 NOTE — PROGRESS NOTES
Spiritual Services Interventions  [id number]   11/14/2024  Sr Thais Antonio  89 y.o. year old female    Encounter Summary  Encounter Overview/Reason: Initial Encounter  Service Provided For: Patient  Referral/Consult From: Radha  Last Encounter : 11/14/24  Complexity of Encounter: Low  Begin Time: 0830  End Time : 0845  Total Time Calculated: 15 min     Spiritual/Emotional needs  Type: Spiritual Support                    Assessment/Intervention/Outcome  Assessment: Calm  Intervention: Sustaining Presence/Ministry of presence, Prayer (assurance of)/[place]  Outcome: Expressed Gratitude

## 2024-11-14 NOTE — CONSULTS
UROLOGY CONSULT    Patient:  Taawnna Antonio  MRN: [id number]    Chief Complaint:   The patient is a 89 y.o. female with bladder mass and urinary tract infection    HISTORY OF PRESENT ILLNESS:   Patient presented to ER with weakness, increased confusion, urinary incontinence malodorous urine on 11/12/2024.  Patient apparently was having vaginal bleeding as well.  Patient is DNR CC.  She did have a urinalysis which showed 20-50 WBCs per high-powered field, 10-20 RBCs per high-powered field, positive leukocyte esterase and negative nitrates.  Patient was anemic.  Patient did have a CT abdomen and pelvis which revealed a 4.7 cm inferior right bladder mass.  Patient also had diffuse bladder wall thickening.  Patient was started on antibiotics and urology was consulted.    Patient does have longstanding chronic kidney disease.    Patient had prior hospitalization last month from 10/13/2024 through 10/18/2024 for urosepsis.  Urine at that time grew Aerococcus.  She was placed on culture specific antibiotics.  She was ultimately discharged to SNF.    Past Medical History:    Past Medical History:   Diagnosis Date    Cancer (HCC)     colon    Colon cancer (HCC) 10/01/2002    Diabetes mellitus (HCC)     Esophageal reflux     Heart disease     Hypercholesterolemia     Hypertension     Microalbuminuria     Osteoarthrosis     Osteoporosis     Polycystic kidney disease        Past Surgical History:    Past Surgical History:   Procedure Laterality Date    COLONOSCOPY  1/09, 5/1/12    COLONOSCOPY  07/10/2017    Dr. Ardon:  3 adenomatous polyps.    DIAGNOSTIC CARDIAC CATH LAB PROCEDURE  2/28/08    HEMICOLECTOMY  10/02    HYSTERECTOMY (CERVIX STATUS UNKNOWN)      KNEE SURGERY  1973    AR COLON CA SCRN NOT HI RSK IND N/A 7/10/2017    COLONOSCOPY performed by Bryson Ardon MD at [facility] OR        Medications:    Scheduled Meds:   magnesium oxide  400 mg Oral Daily    sodium bicarbonate  650 mg Oral BID    sucralfate  1 g Oral 3 times per day  No CVA tenderness  Extremities: Calves are non-tender      LABS:  Recent Labs     11/12/24 1915 11/13/24 0345 11/14/24  0408   WBC 11.9* 9.4 10.6   HGB 7.7* 6.5* 8.0*   HCT 24.0* 20.1* 24.4*   MCV 84.2 83.4 83.8    262 256     Recent Labs     11/12/24 1915 11/13/24 0345 11/14/24  0408   * 136 136   K 3.5* 3.3* 3.7   CL 97* 101 102   CO2 26 28 26   BUN 23 21 22   CREATININE 2.1* 2.0* 2.0*     No results found for: \"PSA\"    Urinalysis:   Recent Labs     11/12/24 1910   COLORU Yellow   PHUR 8.0   WBCUA 20 TO 50   RBCUA 10 TO 20   BACTERIA 1+*   LEUKOCYTESUR 3+*   UROBILINOGEN Normal   BILIRUBINUR NEGATIVE      ASSESSMENT AND PLAN:  Impression:    Patient Active Problem List   Diagnosis    Vitamin D deficiency    Osteoporosis, senile    Mixed hypercholesterolemia and hypertriglyceridemia    H/O malignant neoplasm of colon    GERD (gastroesophageal reflux disease)    CAD (coronary artery disease)    Benign essential HTN    Osteoarthrosis    Controlled type 2 diabetes mellitus with stage 2 chronic kidney disease, without long-term current use of insulin (HCC)    Major depressive disorder with single episode, in full remission ([facility])    Chronic kidney disease, stage IV (severe) ([facility])    Dupuytren's contracture of right hand    Impaired cognitive ability    Sepsis secondary to UTI ([facility])    Moderate malnutrition ([facility])    Anemia    Major depressive disorder    Epidermoid cyst    Epidermoid cyst of face    Facial mass    Gastric ulcer    Hypertension    Multiple gastric erosions    UTI (urinary tract infection)       Plan:   Continue antibiotics until urine culture finalized    Secondary to bladder mass we do recommend cystoscopy as an outpatient.  We discussed risk benefits cystoscopy including bleeding infection.  She amenable to schedule.    I spoke with her son Deandre Costello over the phone in regards to his mother and the plan moving forward for cystoscopy.  He is in agreement to the

## 2024-11-14 NOTE — PROGRESS NOTES
RN informs son Deandre that Dr Murry's office will be contacting him with Urology follow up appointment.     Urology appointment included on discharge AVS for Dec 5 @ 11:15am.

## 2024-11-14 NOTE — DISCHARGE INSTRUCTIONS
Discharge Instructions    Admission Date:  11/12/2024  Discharge Date:  11/15/24    Disposition:   [facility].     Discharge Instructions:  Resume previous home medication but stop aspirin.  Paxil was changed to Lexapro 10 mg daily.    Take Keflex 500 mg two times a day x 7 days.  Take Magnesium 200 mg daily.    Activity:  As tolerated with assistance.  PT / OT daily.  Diet:  General     Set up an appt with Dr. Murry at discharge to further evaluate the bladder mass.      Follow up with Bryson Ardon MD after discharge from the [facility].

## 2024-11-14 NOTE — CARE COORDINATION
11/14/24 1205   IMM Letter   IMM Letter given to Patient/Family/Significant other/Guardian/POA/by: second IMM letter given to son Deandre Costello per Nirmal Nieves RN   IMM Letter date given: 11/14/24   IMM Letter time given: 1020

## 2024-11-14 NOTE — TELEPHONE ENCOUNTER
When I called [facility] the nurse said she wasn't going home until tomorrow. I informed Germain that she needs to be seen and then schedule a cysto.

## 2024-11-15 VITALS
DIASTOLIC BLOOD PRESSURE: 65 MMHG | WEIGHT: 145.8 LBS | OXYGEN SATURATION: 93 % | HEIGHT: 63 IN | RESPIRATION RATE: 18 BRPM | TEMPERATURE: 98 F | HEART RATE: 77 BPM | BODY MASS INDEX: 25.83 KG/M2 | SYSTOLIC BLOOD PRESSURE: 124 MMHG

## 2024-11-15 LAB
SARS-COV-2 RDRP RESP QL NAA+PROBE: NOT DETECTED
SPECIMEN DESCRIPTION: NORMAL

## 2024-11-15 PROCEDURE — 87635 SARS-COV-2 COVID-19 AMP PRB: CPT

## 2024-11-15 PROCEDURE — 6370000000 HC RX 637 (ALT 250 FOR IP): Performed by: INTERNAL MEDICINE

## 2024-11-15 PROCEDURE — 2580000003 HC RX 258: Performed by: INTERNAL MEDICINE

## 2024-11-15 PROCEDURE — 94761 N-INVAS EAR/PLS OXIMETRY MLT: CPT

## 2024-11-15 RX ADMIN — CHOLECALCIFEROL TAB 25 MCG (1000 UNIT) 2000 UNITS: 25 TAB at 09:41

## 2024-11-15 RX ADMIN — ESCITALOPRAM OXALATE 10 MG: 10 TABLET ORAL at 09:41

## 2024-11-15 RX ADMIN — SODIUM BICARBONATE 650 MG: 650 TABLET ORAL at 09:41

## 2024-11-15 RX ADMIN — Medication 400 MG: at 09:40

## 2024-11-15 RX ADMIN — ATORVASTATIN CALCIUM 10 MG: 10 TABLET, FILM COATED ORAL at 09:41

## 2024-11-15 RX ADMIN — SUCRALFATE 1 G: 1 TABLET ORAL at 06:19

## 2024-11-15 RX ADMIN — Medication 1 CAPSULE: at 09:40

## 2024-11-15 RX ADMIN — SODIUM CHLORIDE, PRESERVATIVE FREE 10 ML: 5 INJECTION INTRAVENOUS at 09:41

## 2024-11-15 RX ADMIN — PANTOPRAZOLE SODIUM 40 MG: 40 TABLET, DELAYED RELEASE ORAL at 06:19

## 2024-11-15 ASSESSMENT — PAIN SCALES - GENERAL: PAINLEVEL_OUTOF10: 0

## 2024-11-15 NOTE — PROGRESS NOTES
Acknowledge pt discharge to [facility] today. Family aware of discharge time of 1330 this date and nursing is aware. Pas R completed and Covid test completed. Faxed pas ID, covid result and discharge summary to facility. No further needs identified. Syeda Gresham, MSW, LSW 11/15/2024

## 2024-11-15 NOTE — PLAN OF CARE
[facility]  Phone: [phone]    Inpatient Occupational Therapy Plan of Care  OT Orders Received and Evaluation Complete    Date: 2024  Patient Name: Tawanna Antonio        MRN: [id number]    : 10/20/1931  (89 y.o.)  Referring Practitioner: Dr. Ardon  Diagnosis: UTI  Treatment Diagnosis: UTI    Identified Problem Areas:     Performance deficits / Impairments: Decreased functional mobility , Decreased ADL status, Decreased strength, Decreased endurance, Decreased balance, Decreased vision/visual deficit, Decreased high-level IADLs, Decreased fine motor control, Decreased coordination, Decreased posture     Justification for Skilled Services:     [x] Complete Daily Tasks Safely  [x] Improve Balance   [x]  Return to Prior Level of Function  [x] Family/Caregiver Education    [x] Improve UE strength  [x] Patient Education: [x]Adaptive Equipment   [x]Home Exercise Program and Progression    Treatment Plan:     Times Per Week: 2-3x.  Discharge recommendation: Home with home health services    [] Modalities:  [x] Therapeutic Exercise   [x] Therapeutic Activity  [] Splinting:     [] Home Safety Evaluation         [x] ADL Retraining                       [] Muscle Re-education [] Cognitive Retraining            [] Sensory Integration  [x] Patient Education [x] Home Exercise Program [x] Fine Motor Coordination    Goals:     Short term goals:  Time Frame for Short Term Goals: 3 days (11/15/2024)  Short Term Goal 1: Patient will complete a commode transfer while using DME PRN with SUP/SETUP.  Short Term Goal 2: Patient will complete upper body dressing and/or bathing while using adaptive equipment/techniques PRN with SUP/SETUP.  Short Term Goal 3: Patient will complete lower body dressing and/or bathing while using adaptive equipment/techniques PRN with SBA.  Short Term Goal 4: To increase UE strength for ADLs and functional transfers, patient will engage in 10 minutes of BUE ther ex without a rest 
  Problem: Chronic Conditions and Co-morbidities  Goal: Patient's chronic conditions and co-morbidity symptoms are monitored and maintained or improved  11/14/2024 2151 by Kan Friend RN  Outcome: Progressing  11/14/2024 1440 by Zonia Wade RN  Outcome: Progressing     Problem: Discharge Planning  Goal: Discharge to home or other facility with appropriate resources  11/14/2024 2151 by Kan Friend RN  Outcome: Progressing  11/14/2024 1440 by Zonia Wade RN  Outcome: Progressing     Problem: Safety - Adult  Goal: Free from fall injury  11/14/2024 2151 by Kan Friend RN  Outcome: Progressing  11/14/2024 1440 by Zonia Wade RN  Outcome: Progressing     Problem: Skin/Tissue Integrity  Goal: Absence of new skin breakdown  Description: 1.  Monitor for areas of redness and/or skin breakdown  2.  Assess vascular access sites hourly  3.  Every 4-6 hours minimum:  Change oxygen saturation probe site  4.  Every 4-6 hours:  If on nasal continuous positive airway pressure, respiratory therapy assess nares and determine need for appliance change or resting period.  11/14/2024 2151 by Kan Friend RN  Outcome: Progressing  11/14/2024 1440 by Zonia Wade RN  Outcome: Progressing     Problem: ABCDS Injury Assessment  Goal: Absence of physical injury  11/14/2024 2151 by Kan Friend RN  Outcome: Progressing  11/14/2024 1440 by Zonia Wade RN  Outcome: Progressing     Problem: Nutrition Deficit:  Goal: Optimize nutritional status  11/14/2024 2151 by Kan Friend RN  Outcome: Progressing  11/14/2024 1440 by Zonia Wade RN  Outcome: Progressing     Problem: Neurosensory - Adult  Goal: Achieves stable or improved neurological status  11/14/2024 1440 by Zonia Wade RN  Outcome: Progressing  Goal: Absence of seizures  11/14/2024 1440 by Zonia Wade RN  Outcome: Progressing  Goal: Remains free of injury related to seizures activity  11/14/2024 1440 by Zonia Wade RN  Outcome: 
  Problem: Chronic Conditions and Co-morbidities  Goal: Patient's chronic conditions and co-morbidity symptoms are monitored and maintained or improved  Outcome: Progressing     Problem: Discharge Planning  Goal: Discharge to home or other facility with appropriate resources  Outcome: Progressing     Problem: Safety - Adult  Goal: Free from fall injury  Outcome: Progressing     Problem: Skin/Tissue Integrity  Goal: Absence of new skin breakdown  Description: 1.  Monitor for areas of redness and/or skin breakdown  2.  Assess vascular access sites hourly  3.  Every 4-6 hours minimum:  Change oxygen saturation probe site  4.  Every 4-6 hours:  If on nasal continuous positive airway pressure, respiratory therapy assess nares and determine need for appliance change or resting period.  Outcome: Progressing     Problem: ABCDS Injury Assessment  Goal: Absence of physical injury  Outcome: Progressing     Problem: Nutrition Deficit:  Goal: Optimize nutritional status  Outcome: Progressing     Problem: Neurosensory - Adult  Goal: Achieves stable or improved neurological status  Outcome: Progressing  Goal: Absence of seizures  Outcome: Progressing  Goal: Remains free of injury related to seizures activity  Outcome: Progressing  Goal: Achieves maximal functionality and self care  Outcome: Progressing     Problem: Skin/Tissue Integrity - Adult  Goal: Skin integrity remains intact  Outcome: Progressing  Goal: Incisions, wounds, or drain sites healing without S/S of infection  Outcome: Progressing  Goal: Oral mucous membranes remain intact  Outcome: Progressing     Problem: Musculoskeletal - Adult  Goal: Return mobility to safest level of function  Outcome: Progressing  Goal: Maintain proper alignment of affected body part  Outcome: Progressing  Goal: Return ADL status to a safe level of function  Outcome: Progressing     Problem: Genitourinary - Adult  Goal: Absence of urinary retention  Outcome: Progressing     
[facility]  Inpatient Physical Therapy  Plan of Care  Date: 2024  Patient Name: Tawanna Antonio        : 10/20/1931  (89 y.o.)  Referring Practitioner: Dr. Ardon  Admission Date: 2024  Referral Date : 24  Diagnosis: UTI  Treatment Diagnosis: Weakness  PT Orders Received and Evaluation Complete  Identified Problem Areas:  Therapy Prognosis: Good    Justification for Skilled Services:  [] Reduce Falls   [x] Improve Ambulation  [x]  Complete Daily Tasks Safely   [] Improve Balance   [x] Improve LE strength  []  Return to Prior Level of Function  [x] Improve Functional Mobility   []  Family/Caregiver Education  [x] Patient Education: []Assistive Devices []Home Exercise Program and Progression    Plan  Frequency: 1-2x/day Daily M-F, 1x/day Sat-Sun    Duration: 5 days  [] Modalities:  [x] Therapeutic Exercise   [x] Gait Training  [x] Therapeutic Activity    [] Home Safety Evaluation         [] Massage                        [x] Neuromuscular Re-education [] Back Education             [x] Patient Education [] Home Exercise Program  Discharge Recommendations: Continue to assess pending progress, Home with assist PRN    Rehab Potential:  [x]  Good [] Fair   []  Poor    Goals  Short Term Goals  Time Frame for Short Term Goals: 5 days - expires 24  Short Term Goal 1: Complete basic transfers in/OOB and chair/commode independently to safely return home  Short Term Goal 2: Ambulate with wh walker 75 ft with supervision to safely negotiate home environment  Short Term Goal 3: Good dynamic balance to complete self-care tasks    Long Term Goals  Long Term Goal 1: LTG=STG    Upon Swingbed Transfer this Plan of Care will be followed until revision is complete.    NISHA GONZALEZ, PT,    Date: 2024   
Statement Selected

## 2024-11-15 NOTE — PROGRESS NOTES
Hospitalist Progress Note  11/15/2024 4:41 AM  Subjective:   Admit Date: 11/12/2024  PCP: Bryson Ardon MD    Interval History: Virginia has no complaints this am.  She is planning on going to the [facility] today.  Appreciate Urology evaluation yesterday, she understands that she is going to have an out patient Cystoscopy.  No CP / SOB.  Appetite is okay.     Diet: ADULT DIET; Regular  ADULT ORAL NUTRITION SUPPLEMENT; Breakfast, Lunch, Dinner; Standard High Calorie/High Protein Oral Supplement  Medications:   Scheduled Meds:   magnesium oxide  400 mg Oral Daily    sodium bicarbonate  650 mg Oral BID    sucralfate  1 g Oral 3 times per day    escitalopram  10 mg Oral Daily    [Held by provider] aspirin  81 mg Oral BID    atorvastatin  10 mg Oral Daily    Vitamin D  2,000 Units Oral Daily    lactobacillus  1 capsule Oral Daily with breakfast    pantoprazole  40 mg Oral QAM AC    sodium chloride flush  5-40 mL IntraVENous 2 times per day    cefTRIAXone (ROCEPHIN) IV  1,000 mg IntraVENous Q24H     Continuous Infusions:   sodium chloride      sodium chloride         Patient's current medications documented, reviewed, and updated.      CBC:   Recent Labs     11/12/24 1915 11/13/24 0345 11/14/24  0408   WBC 11.9* 9.4 10.6   HGB 7.7* 6.5* 8.0*    262 256     BMP:    Recent Labs     11/12/24 1915 11/13/24 0345 11/14/24  0408   * 136 136   K 3.5* 3.3* 3.7   CL 97* 101 102   CO2 26 28 26   BUN 23 21 22   CREATININE 2.1* 2.0* 2.0*   GLUCOSE 153* 100* 107*     Hepatic:   Recent Labs     11/12/24 1915   AST 10   ALT 6   BILITOT 0.4   ALKPHOS 101     Troponin: No results for input(s): \"TROPONINI\" in the last 72 hours.  BNP: No results for input(s): \"BNP\" in the last 72 hours.  Lipids: No results for input(s): \"CHOL\", \"HDL\" in the last 72 hours.    Invalid input(s): \"LDLCALCU\"  INR: No results for input(s): \"INR\" in the last 72 hours.      Objective:   Vitals: /64   Pulse 97   Temp 98.2 °F (36.8 °C) (Oral)

## 2024-11-15 NOTE — DISCHARGE SUMMARY
Hospitalist Discharge Summary    Tawanna Antonio  :  10/20/1931  MRN:  [id number]    Admit date:  2024  Discharge date:  11/15/24    Admitting Physician:  Bryson Ardon MD    Discharge Diagnoses:   UTI      Acute on chronic anemia.      Bladder mass.      Generalized weakness.      Dementia.      CAD       CKD stage III B.      H/O depression / anxiety.      Vitamin D deficiency       Hyperlpidemia    Admission Condition:  fair      Discharged Condition:  good    Hospital Course:     The patient is a 89 y.o. female who presents to the ER with family with worsening weakness, urinary incontinence, and odorous urine.  According to Virginia and EMR, she recently was discharged from the [facility] to home with the assistance of her family.  She had recently been seen by Dr. Cardona for possible vaginal bleeding with a normal exam.  He had ordered an US of the kidney and bladder to rule out mass.  After returning home Virginia gradually become weaker with some increase in confusion.  She states she has been eating and drinking well with no nausea.  Virginia states she is unable to hold her urine.  She denies any trouble moving her bowels.  No fever or chills.  No recent cold symptoms or cough.  She denies having any chest pain or SOB.     In the ER her CMP was normal other than a Sodium at 134, Potassium at 3.5, Creatinine 2.1, Glucose 153, Albumin 2.8, and Total Protein of 6.3.  CBC showed a WBC at 11.9, Hgb 7.7, and Plt ct at 332.  UA showed 3 + LE with 20 to 50 WBC.      Virginia was admitted to the hospital on IV Rocephin.  She was placed on SCD's for DVT prophylaxis.  Aspirin was placed on hold secondary to her anemia.  After admission her mental status seemed to return to baseline.  PT / OT was ordered daily.  She was found to have a drop in Hgb to 6.5 and received 1 unit of PRBC's which she tolerated well.  With a negative  exam recently, a CT scan of the abdomen and pelvis was ordered showing a bladder mass.   Urology was consulted and seen Virginia in the hospital.  They talked with Virginia and her son Deandre, and patient will be set up for an out patient Cystoscopy.   was consulted per the request of her family and arranged for her to go to the [facility] at discharge with possible Hospice evaluation in the future.      Discharge Exam:    Vitals: /64   Pulse 97   Temp 98.2 °F (36.8 °C) (Oral)   Resp 18   Ht 1.6 m (5' 3\")   Wt 66.1 kg (145 lb 12.8 oz)   SpO2 93%   BMI 25.83 kg/m²   General appearance: alert and cooperative with exam  HEENT: Head: Normocephalic, no lesions, without obvious abnormality.  Eye: Normal external eye, conjunctiva, lids cornea, JANKI.  Nose: Normal external nose, mucus membranes and septum.  Neck: no adenopathy, no carotid bruit, and supple, symmetrical, trachea midline  Lungs: clear to auscultation bilaterally  Heart: regular rate and rhythm, S1, S2 normal, and II/VI systolic murmur  Abdomen: soft, non-tender; bowel sounds normal; no masses,  no organomegaly  Extremities: extremities normal, atraumatic, no cyanosis or edema  Neurologic: Mental status: Alert, oriented, thought content appropriate    Discharge Medications:         Medication List        START taking these medications      cephALEXin 500 MG capsule  Commonly known as: KEFLEX  Take 1 capsule by mouth 2 times daily for 7 days     escitalopram 10 MG tablet  Commonly known as: LEXAPRO  Take 1 tablet by mouth daily     magnesium oxide 400 (240 Mg) MG tablet  Commonly known as: MAG-OX  Take 0.5 tablets by mouth daily            CHANGE how you take these medications      sodium bicarbonate 650 MG tablet  Take 1 tablet by mouth 2 times daily  What changed: when to take this            CONTINUE taking these medications      atorvastatin 10 MG tablet  Commonly known as: LIPITOR     lactobacillus capsule  Take 1 capsule by mouth daily (with breakfast)     PANTOPRAZOLE SODIUM PO     polyethylene glycol 17 g

## 2024-11-15 NOTE — TELEPHONE ENCOUNTER
Patient is scheduled for Renal US 11/21/2024.  Patient has appt scheduled for 12/05/2024 for cystoscopy in [place].

## 2024-11-19 ENCOUNTER — COMMUNITY CARE MANAGEMENT (OUTPATIENT)
Facility: CLINIC | Age: 89
End: 2024-11-19

## 2024-11-19 NOTE — PROGRESS NOTES
Patient was d/c: 11/17/24     DC Follow up 1 - unsuccessful, left voicemail message with [facility] number.  1/18/24      DC Follow up 2 - unsuccessful, left voicemail message with [facility] number. Sent message to provider pool ([facility]) and PCP,  . Epic message noted below 11/19/24     Transition Care Management Nurse attempted 2 outreach calls and was unable to connect with patient. Voicemail message was left with [facility] number. Patient has completed a follow up appointment scheduled with your office 11/14/24, but was then readmitted. Final TCM attempt planned for the week of 11/22/24  Thank you,      [facility] TCM  Kymberly Mascorro, RN, BSN

## 2024-11-21 ENCOUNTER — HOSPITAL ENCOUNTER (OUTPATIENT)
Dept: ULTRASOUND IMAGING | Age: 89
Discharge: HOME | End: 2024-11-23
Payer: MEDICARE

## 2024-11-21 DIAGNOSIS — N95.0 PMB (POSTMENOPAUSAL BLEEDING): ICD-10-CM

## 2024-11-21 PROCEDURE — 76775 US EXAM ABDO BACK WALL LIM: CPT | Performed by: OBSTETRICS & GYNECOLOGY

## 2024-11-22 ENCOUNTER — APPOINTMENT (OUTPATIENT)
Dept: GENERAL RADIOLOGY | Age: 88
DRG: 872 | End: 2024-11-22
Payer: MEDICARE

## 2024-11-22 ENCOUNTER — HOSPITAL ENCOUNTER (INPATIENT)
Age: 88
LOS: 1 days | Discharge: NURSING, REHAB OR LONG-TERM CARE | DRG: 872 | End: 2024-11-24
Attending: EMERGENCY MEDICINE | Admitting: INTERNAL MEDICINE
Payer: MEDICARE

## 2024-11-22 DIAGNOSIS — R41.0 DELIRIUM: ICD-10-CM

## 2024-11-22 DIAGNOSIS — N39.0 URINARY TRACT INFECTION WITHOUT HEMATURIA, SITE UNSPECIFIED: Primary | ICD-10-CM

## 2024-11-22 DIAGNOSIS — D64.9 ANEMIA, UNSPECIFIED TYPE: ICD-10-CM

## 2024-11-22 LAB
-: ABNORMAL
ALBUMIN SERPL-MCNC: 3 G/DL (ref 3.5–5.2)
ALP SERPL-CCNC: 125 U/L (ref 35–104)
ALT SERPL-CCNC: 8 U/L (ref 5–33)
ANION GAP SERPL CALCULATED.3IONS-SCNC: 9 MMOL/L (ref 9–17)
AST SERPL-CCNC: 10 U/L
BACTERIA URNS QL MICRO: ABNORMAL
BASOPHILS # BLD: 0 K/UL (ref 0–0.2)
BASOPHILS NFR BLD: 0 % (ref 0–2)
BILIRUB SERPL-MCNC: 0.3 MG/DL (ref 0.3–1.2)
BILIRUB UR QL STRIP: NEGATIVE
BUN SERPL-MCNC: 25 MG/DL (ref 8–23)
BUN/CREAT SERPL: 13 (ref 9–20)
CALCIUM SERPL-MCNC: 9.1 MG/DL (ref 8.6–10.4)
CHLORIDE SERPL-SCNC: 97 MMOL/L (ref 98–107)
CLARITY UR: CLEAR
CO2 SERPL-SCNC: 27 MMOL/L (ref 20–31)
COLOR UR: YELLOW
COMMENT: ABNORMAL
CREAT SERPL-MCNC: 1.9 MG/DL (ref 0.5–0.9)
CYTOLOGY REPORT: NORMAL
EOSINOPHIL # BLD: 0.15 K/UL (ref 0–0.4)
EOSINOPHILS RELATIVE PERCENT: 1 % (ref 0–5)
ERYTHROCYTE [DISTWIDTH] IN BLOOD BY AUTOMATED COUNT: 17.7 % (ref 12.1–15.2)
GFR, ESTIMATED: 24 ML/MIN/1.73M2
GLUCOSE SERPL-MCNC: 182 MG/DL (ref 70–99)
GLUCOSE UR STRIP-MCNC: NEGATIVE MG/DL
HCT VFR BLD AUTO: 29.7 % (ref 36–46)
HGB BLD-MCNC: 9.2 G/DL (ref 12–16)
HGB UR QL STRIP.AUTO: ABNORMAL
IMM GRANULOCYTES # BLD AUTO: 0 K/UL (ref 0–0.3)
IMM GRANULOCYTES NFR BLD: 0 % (ref 0–5)
KETONES UR STRIP-MCNC: NEGATIVE MG/DL
LACTATE BLDV-SCNC: 1.9 MMOL/L (ref 0.5–1.9)
LEUKOCYTE ESTERASE UR QL STRIP: ABNORMAL
LYMPHOCYTES NFR BLD: 0.44 K/UL (ref 1–4.8)
LYMPHOCYTES RELATIVE PERCENT: 3 % (ref 15–40)
MCH RBC QN AUTO: 27.1 PG (ref 26–34)
MCHC RBC AUTO-ENTMCNC: 31 G/DL (ref 31–37)
MCV RBC AUTO: 87.4 FL (ref 80–100)
MONOCYTES NFR BLD: 0.15 K/UL (ref 0–1)
MONOCYTES NFR BLD: 1 % (ref 4–8)
MORPHOLOGY: ABNORMAL
NEUTROPHILS NFR BLD: 95 % (ref 47–75)
NEUTS SEG NFR BLD: 13.76 K/UL (ref 2.5–7)
NITRITE UR QL STRIP: NEGATIVE
PH UR STRIP: 7 [PH] (ref 5–8)
PLATELET # BLD AUTO: 274 K/UL (ref 140–450)
PMV BLD AUTO: 8.7 FL (ref 6–12)
POTASSIUM SERPL-SCNC: 3.8 MMOL/L (ref 3.7–5.3)
PROT SERPL-MCNC: 7 G/DL (ref 6.4–8.3)
PROT UR STRIP-MCNC: ABNORMAL MG/DL
RBC # BLD AUTO: 3.4 M/UL (ref 4–5.2)
RBC #/AREA URNS HPF: ABNORMAL /HPF (ref 0–2)
SODIUM SERPL-SCNC: 133 MMOL/L (ref 135–144)
SP GR UR STRIP: 1.01 (ref 1–1.03)
UROBILINOGEN UR STRIP-ACNC: NORMAL EU/DL (ref 0–1)
WBC #/AREA URNS HPF: ABNORMAL /HPF
WBC OTHER # BLD: 14.5 K/UL (ref 3.5–11)

## 2024-11-22 PROCEDURE — 87086 URINE CULTURE/COLONY COUNT: CPT

## 2024-11-22 PROCEDURE — 36415 COLL VENOUS BLD VENIPUNCTURE: CPT

## 2024-11-22 PROCEDURE — 99285 EMERGENCY DEPT VISIT HI MDM: CPT

## 2024-11-22 PROCEDURE — 81001 URINALYSIS AUTO W/SCOPE: CPT

## 2024-11-22 PROCEDURE — 96374 THER/PROPH/DIAG INJ IV PUSH: CPT

## 2024-11-22 PROCEDURE — 83605 ASSAY OF LACTIC ACID: CPT

## 2024-11-22 PROCEDURE — 71045 X-RAY EXAM CHEST 1 VIEW: CPT

## 2024-11-22 PROCEDURE — 85025 COMPLETE CBC W/AUTO DIFF WBC: CPT

## 2024-11-22 PROCEDURE — 80053 COMPREHEN METABOLIC PANEL: CPT

## 2024-11-22 PROCEDURE — 2580000003 HC RX 258: Performed by: EMERGENCY MEDICINE

## 2024-11-22 PROCEDURE — 6360000002 HC RX W HCPCS: Performed by: EMERGENCY MEDICINE

## 2024-11-22 RX ORDER — 0.9 % SODIUM CHLORIDE 0.9 %
500 INTRAVENOUS SOLUTION INTRAVENOUS ONCE
Status: COMPLETED | OUTPATIENT
Start: 2024-11-22 | End: 2024-11-22

## 2024-11-22 RX ADMIN — WATER 1000 MG: 1 INJECTION INTRAMUSCULAR; INTRAVENOUS; SUBCUTANEOUS at 23:49

## 2024-11-22 RX ADMIN — SODIUM CHLORIDE 500 ML: 9 INJECTION, SOLUTION INTRAVENOUS at 23:05

## 2024-11-22 ASSESSMENT — PAIN - FUNCTIONAL ASSESSMENT: PAIN_FUNCTIONAL_ASSESSMENT: NONE - DENIES PAIN

## 2024-11-23 PROBLEM — N30.00 ACUTE CYSTITIS WITHOUT HEMATURIA: Status: ACTIVE | Noted: 2024-11-23

## 2024-11-23 PROCEDURE — 36415 COLL VENOUS BLD VENIPUNCTURE: CPT

## 2024-11-23 PROCEDURE — 94761 N-INVAS EAR/PLS OXIMETRY MLT: CPT

## 2024-11-23 PROCEDURE — 2060000000 HC ICU INTERMEDIATE R&B

## 2024-11-23 PROCEDURE — 6370000000 HC RX 637 (ALT 250 FOR IP): Performed by: INTERNAL MEDICINE

## 2024-11-23 PROCEDURE — 2700000000 HC OXYGEN THERAPY PER DAY

## 2024-11-23 PROCEDURE — 2580000003 HC RX 258: Performed by: INTERNAL MEDICINE

## 2024-11-23 PROCEDURE — 87040 BLOOD CULTURE FOR BACTERIA: CPT

## 2024-11-23 PROCEDURE — 6360000002 HC RX W HCPCS: Performed by: INTERNAL MEDICINE

## 2024-11-23 RX ORDER — LACTOBACILLUS RHAMNOSUS GG 10B CELL
1 CAPSULE ORAL
Status: DISCONTINUED | OUTPATIENT
Start: 2024-11-23 | End: 2024-11-24 | Stop reason: HOSPADM

## 2024-11-23 RX ORDER — LANOLIN ALCOHOL/MO/W.PET/CERES
200 CREAM (GRAM) TOPICAL DAILY
Status: DISCONTINUED | OUTPATIENT
Start: 2024-11-23 | End: 2024-11-24 | Stop reason: HOSPADM

## 2024-11-23 RX ORDER — ACETAMINOPHEN 325 MG/1
650 TABLET ORAL EVERY 6 HOURS PRN
Status: DISCONTINUED | OUTPATIENT
Start: 2024-11-23 | End: 2024-11-24 | Stop reason: HOSPADM

## 2024-11-23 RX ORDER — ESCITALOPRAM OXALATE 10 MG/1
10 TABLET ORAL DAILY
Status: DISCONTINUED | OUTPATIENT
Start: 2024-11-23 | End: 2024-11-24 | Stop reason: HOSPADM

## 2024-11-23 RX ORDER — SODIUM CHLORIDE 9 MG/ML
INJECTION, SOLUTION INTRAVENOUS CONTINUOUS
Status: ACTIVE | OUTPATIENT
Start: 2024-11-23 | End: 2024-11-24

## 2024-11-23 RX ORDER — SODIUM BICARBONATE 650 MG/1
650 TABLET ORAL 2 TIMES DAILY
Status: DISCONTINUED | OUTPATIENT
Start: 2024-11-23 | End: 2024-11-24 | Stop reason: HOSPADM

## 2024-11-23 RX ORDER — ONDANSETRON 2 MG/ML
4 INJECTION INTRAMUSCULAR; INTRAVENOUS EVERY 6 HOURS PRN
Status: DISCONTINUED | OUTPATIENT
Start: 2024-11-23 | End: 2024-11-24 | Stop reason: HOSPADM

## 2024-11-23 RX ORDER — SODIUM CHLORIDE 0.9 % (FLUSH) 0.9 %
5-40 SYRINGE (ML) INJECTION EVERY 12 HOURS SCHEDULED
Status: DISCONTINUED | OUTPATIENT
Start: 2024-11-23 | End: 2024-11-24 | Stop reason: HOSPADM

## 2024-11-23 RX ORDER — POLYETHYLENE GLYCOL 3350 17 G/17G
17 POWDER, FOR SOLUTION ORAL DAILY PRN
Status: DISCONTINUED | OUTPATIENT
Start: 2024-11-23 | End: 2024-11-24 | Stop reason: HOSPADM

## 2024-11-23 RX ORDER — PANTOPRAZOLE SODIUM 40 MG/1
40 TABLET, DELAYED RELEASE ORAL 2 TIMES DAILY
Status: DISCONTINUED | OUTPATIENT
Start: 2024-11-23 | End: 2024-11-24 | Stop reason: HOSPADM

## 2024-11-23 RX ORDER — ENOXAPARIN SODIUM 100 MG/ML
30 INJECTION SUBCUTANEOUS DAILY
Status: DISCONTINUED | OUTPATIENT
Start: 2024-11-23 | End: 2024-11-24 | Stop reason: HOSPADM

## 2024-11-23 RX ORDER — SODIUM CHLORIDE 9 MG/ML
INJECTION, SOLUTION INTRAVENOUS PRN
Status: DISCONTINUED | OUTPATIENT
Start: 2024-11-23 | End: 2024-11-24 | Stop reason: HOSPADM

## 2024-11-23 RX ORDER — SODIUM CHLORIDE 0.9 % (FLUSH) 0.9 %
5-40 SYRINGE (ML) INJECTION PRN
Status: DISCONTINUED | OUTPATIENT
Start: 2024-11-23 | End: 2024-11-24 | Stop reason: HOSPADM

## 2024-11-23 RX ORDER — ACETAMINOPHEN 650 MG/1
650 SUPPOSITORY RECTAL EVERY 6 HOURS PRN
Status: DISCONTINUED | OUTPATIENT
Start: 2024-11-23 | End: 2024-11-24 | Stop reason: HOSPADM

## 2024-11-23 RX ORDER — ONDANSETRON 4 MG/1
4 TABLET, ORALLY DISINTEGRATING ORAL EVERY 8 HOURS PRN
Status: DISCONTINUED | OUTPATIENT
Start: 2024-11-23 | End: 2024-11-24 | Stop reason: HOSPADM

## 2024-11-23 RX ADMIN — ENOXAPARIN SODIUM 30 MG: 100 INJECTION SUBCUTANEOUS at 08:32

## 2024-11-23 RX ADMIN — SODIUM CHLORIDE, PRESERVATIVE FREE 10 ML: 5 INJECTION INTRAVENOUS at 22:13

## 2024-11-23 RX ADMIN — SODIUM BICARBONATE 650 MG: 650 TABLET ORAL at 22:13

## 2024-11-23 RX ADMIN — PIPERACILLIN AND TAZOBACTAM 3375 MG: 3; .375 INJECTION, POWDER, LYOPHILIZED, FOR SOLUTION INTRAVENOUS at 13:37

## 2024-11-23 RX ADMIN — PIPERACILLIN AND TAZOBACTAM 3375 MG: 3; .375 INJECTION, POWDER, LYOPHILIZED, FOR SOLUTION INTRAVENOUS at 02:18

## 2024-11-23 RX ADMIN — PANTOPRAZOLE SODIUM 40 MG: 40 TABLET, DELAYED RELEASE ORAL at 17:04

## 2024-11-23 RX ADMIN — SODIUM BICARBONATE 650 MG: 650 TABLET ORAL at 08:32

## 2024-11-23 RX ADMIN — SODIUM BICARBONATE 650 MG: 650 TABLET ORAL at 02:19

## 2024-11-23 RX ADMIN — SODIUM CHLORIDE: 9 INJECTION, SOLUTION INTRAVENOUS at 01:11

## 2024-11-23 RX ADMIN — Medication 1 CAPSULE: at 08:32

## 2024-11-23 RX ADMIN — ESCITALOPRAM OXALATE 10 MG: 10 TABLET ORAL at 08:32

## 2024-11-23 RX ADMIN — Medication 200 MG: at 08:32

## 2024-11-23 RX ADMIN — PANTOPRAZOLE SODIUM 40 MG: 40 TABLET, DELAYED RELEASE ORAL at 08:32

## 2024-11-23 NOTE — PROGRESS NOTES
Pharmacy Note     Renal Dose Adjustment    Tawanna Antonio is a 89 y.o. female. Pharmacist assessment of renally cleared medications.    Recent Labs     11/22/24 2126   BUN 25*       Recent Labs     11/22/24 2126   CREATININE 1.9*       Estimated Creatinine Clearance: 17 mL/min (A) (based on SCr of 1.9 mg/dL (H)).  Estimated CrCl using Ideal Body Weight: 16.6 mL/min (based on IBW 57 kg)    Height:   Ht Readings from Last 1 Encounters:   11/22/24 1.651 m (5' 5\")     Weight:  Wt Readings from Last 1 Encounters:   11/22/24 66.1 kg (145 lb 12.8 oz)       The following medication dose has been adjusted based upon renal function per P&T Guidelines:             Zosyn dose adjusted from 3,375 mg IV every 8 hours to 3,375 mg IV every 12 hours        Deandre De Pharm.D.    11/23/2024  1:06 AM

## 2024-11-23 NOTE — ED NOTES
Unable to obtain IV access after multiple Rns attempted. Lab obtained blood. Physician and supervisor notified.

## 2024-11-23 NOTE — PROGRESS NOTES
Dr. Guzman called to confirm 1 blood culture needs drawn. Informed her that 1g of rocephin was given in ER. Dr. Guzman confirms 1 blood culture to be drawn.

## 2024-11-23 NOTE — H&P
Medical Decision Making (MDM) Data:    External documents reviewed:      My CXR interpretation:     My EKG interpretation:     Discussed with: ER physician Dr. Falk      Tests considered but not ordered:      Heart score:      Social Determinants of Health that impact treatment or disposition:            Assessment and Plan     1.  Sepsis due to UTI -patient with history of recurrent UTI.  Started on IV Zosyn, urine culture and blood cultures pending.  Fever resolved.  Lactic acid was normal.  Hemodynamically stable.  2.  CKD stage IIIb-at baseline  3.  Chronic anemia-H&H stable  4.  Altered mental status due to sepsis  5.  Hypertension -blood pressure stable, monitoring  6.  Recently diagnosed bladder mass -patient supposed to follow-up with outpatient cystoscopy  7.  GERD with hiatal hernia-on Protonix  8.  Dementia, history of depression and anxiety -on Lexapro    Differential Diagnosis: Viral illness, UTI, less likely stroke    Condition is improving / unchanged / worsening: Improving    Condition is a treatment goal: No    Chronic condition is / is not having mild / moderate, severe exacerbation, progression or side effects of treatment:        Shared decision making:          Code status and discussions: DNR CC      Medical Necessity:  Inpatient is appropriate for this patient due to need for IV fluids, IV antibiotics    Estimated length of stay: 2 days  .      The beneficiary may reasonably be expected to be discharged or transferred to a hospital within 96 hours after admission.      Patient Active Problem List   Diagnosis Code    Vitamin D deficiency E55.9    Osteoporosis, senile M81.0    Mixed hypercholesterolemia and hypertriglyceridemia E78.2    H/O malignant neoplasm of colon Z85.038    GERD (gastroesophageal reflux disease) K21.9    CAD (coronary artery disease) I25.10    Benign essential HTN I10    Osteoarthrosis M19.90    Controlled type 2 diabetes mellitus with stage 2 chronic kidney

## 2024-11-23 NOTE — ED PROVIDER NOTES
[facility] ED  eMERGENCY dEPARTMENT eNCOUnter      Pt Name: Tawanna Antonio  MRN: [id number]  Birthdate 10/20/1931  Date of evaluation: 11/22/2024  Provider: Rafael Falk MD    CHIEF COMPLAINT       Chief Complaint   Patient presents with    Illness     Brought from [place] by [facility]. No report called from [place]. [facility] nurse thinks patient is having a heart attack due to hypertension and shaking.      Patient is a 89-year-old female who presents to the emergency department complaining of shaking per the nursing home staff.  They felt that maybe she was having pain because she was shaking.  Patient is not giving any other history.  She is a DNR comfort care.  On arrival patient's temperature was 101.6.  Patient is smiling and pleasant but not able to give further history she is denying any chest pain or abdominal pain        Nursing Notes were reviewed.    REVIEW OF SYSTEMS    (2-9 systems for level 4, 10 or more for level 5)     Review of Systems    Except as noted above the remainder of the review of systems was reviewed and negative.       PAST MEDICAL HISTORY     Past Medical History:   Diagnosis Date    Cancer (HCC)     colon    Colon cancer (HCC) 10/01/2002    Dementia (HCC)     Diabetes mellitus (HCC)     Esophageal reflux     Heart disease     Hypercholesterolemia     Hypertension     Microalbuminuria     Osteoarthrosis     Osteoporosis     Polycystic kidney disease          SURGICAL HISTORY       Past Surgical History:   Procedure Laterality Date    COLONOSCOPY  1/09, 5/1/12    COLONOSCOPY  07/10/2017    Dr. Ardon:  3 adenomatous polyps.    DIAGNOSTIC CARDIAC CATH LAB PROCEDURE  2/28/08    HEMICOLECTOMY  10/02    HYSTERECTOMY (CERVIX STATUS UNKNOWN)      KNEE SURGERY  1973    CO COLON CA SCRN NOT HI RSK IND N/A 7/10/2017    COLONOSCOPY performed by Bryson Ardon MD at [facility] OR         ALLERGIES     Patient has no known allergies.    FAMILY HISTORY       Family History   Problem Relation Age

## 2024-11-24 VITALS
HEART RATE: 75 BPM | WEIGHT: 145.8 LBS | DIASTOLIC BLOOD PRESSURE: 75 MMHG | SYSTOLIC BLOOD PRESSURE: 142 MMHG | TEMPERATURE: 98.5 F | HEIGHT: 65 IN | OXYGEN SATURATION: 95 % | BODY MASS INDEX: 24.29 KG/M2 | RESPIRATION RATE: 18 BRPM

## 2024-11-24 LAB
ANION GAP SERPL CALCULATED.3IONS-SCNC: 9 MMOL/L (ref 9–17)
BASOPHILS # BLD: 0.02 K/UL (ref 0–0.2)
BASOPHILS NFR BLD: 0 % (ref 0–2)
BUN SERPL-MCNC: 22 MG/DL (ref 8–23)
BUN/CREAT SERPL: 12 (ref 9–20)
CALCIUM SERPL-MCNC: 8.6 MG/DL (ref 8.6–10.4)
CHLORIDE SERPL-SCNC: 104 MMOL/L (ref 98–107)
CO2 SERPL-SCNC: 24 MMOL/L (ref 20–31)
CREAT SERPL-MCNC: 1.8 MG/DL (ref 0.5–0.9)
EOSINOPHIL # BLD: 0.21 K/UL (ref 0–0.4)
EOSINOPHILS RELATIVE PERCENT: 3 % (ref 0–5)
ERYTHROCYTE [DISTWIDTH] IN BLOOD BY AUTOMATED COUNT: 17.7 % (ref 12.1–15.2)
GFR, ESTIMATED: 26 ML/MIN/1.73M2
GLUCOSE SERPL-MCNC: 95 MG/DL (ref 70–99)
HCT VFR BLD AUTO: 21.5 % (ref 36–46)
HCT VFR BLD AUTO: 30 % (ref 36–46)
HGB BLD-MCNC: 7 G/DL (ref 12–16)
HGB BLD-MCNC: 8.9 G/DL (ref 12–16)
IMM GRANULOCYTES # BLD AUTO: 0.01 K/UL (ref 0–0.3)
IMM GRANULOCYTES NFR BLD: 0 % (ref 0–5)
LYMPHOCYTES NFR BLD: 1.69 K/UL (ref 1–4.8)
LYMPHOCYTES RELATIVE PERCENT: 23 % (ref 15–40)
MCH RBC QN AUTO: 28.1 PG (ref 26–34)
MCHC RBC AUTO-ENTMCNC: 32.6 G/DL (ref 31–37)
MCV RBC AUTO: 86.3 FL (ref 80–100)
MICROORGANISM SPEC CULT: NORMAL
MONOCYTES NFR BLD: 0.56 K/UL (ref 0–1)
MONOCYTES NFR BLD: 8 % (ref 4–8)
NEUTROPHILS NFR BLD: 67 % (ref 47–75)
NEUTS SEG NFR BLD: 5 K/UL (ref 2.5–7)
PLATELET # BLD AUTO: 216 K/UL (ref 140–450)
PMV BLD AUTO: 9.3 FL (ref 6–12)
POTASSIUM SERPL-SCNC: 3.8 MMOL/L (ref 3.7–5.3)
RBC # BLD AUTO: 2.49 M/UL (ref 4–5.2)
SARS-COV-2 RDRP RESP QL NAA+PROBE: NOT DETECTED
SODIUM SERPL-SCNC: 137 MMOL/L (ref 135–144)
SPECIMEN DESCRIPTION: NORMAL
SPECIMEN DESCRIPTION: NORMAL
WBC OTHER # BLD: 7.5 K/UL (ref 3.5–11)

## 2024-11-24 PROCEDURE — 30233N1 TRANSFUSION OF NONAUTOLOGOUS RED BLOOD CELLS INTO PERIPHERAL VEIN, PERCUTANEOUS APPROACH: ICD-10-PCS | Performed by: INTERNAL MEDICINE

## 2024-11-24 PROCEDURE — 86900 BLOOD TYPING SEROLOGIC ABO: CPT

## 2024-11-24 PROCEDURE — 36430 TRANSFUSION BLD/BLD COMPNT: CPT

## 2024-11-24 PROCEDURE — 87635 SARS-COV-2 COVID-19 AMP PRB: CPT

## 2024-11-24 PROCEDURE — 6360000002 HC RX W HCPCS: Performed by: INTERNAL MEDICINE

## 2024-11-24 PROCEDURE — 86901 BLOOD TYPING SEROLOGIC RH(D): CPT

## 2024-11-24 PROCEDURE — 80048 BASIC METABOLIC PNL TOTAL CA: CPT

## 2024-11-24 PROCEDURE — 94761 N-INVAS EAR/PLS OXIMETRY MLT: CPT

## 2024-11-24 PROCEDURE — 86920 COMPATIBILITY TEST SPIN: CPT

## 2024-11-24 PROCEDURE — 85025 COMPLETE CBC W/AUTO DIFF WBC: CPT

## 2024-11-24 PROCEDURE — 85018 HEMOGLOBIN: CPT

## 2024-11-24 PROCEDURE — 36415 COLL VENOUS BLD VENIPUNCTURE: CPT

## 2024-11-24 PROCEDURE — 85014 HEMATOCRIT: CPT

## 2024-11-24 PROCEDURE — 6370000000 HC RX 637 (ALT 250 FOR IP): Performed by: INTERNAL MEDICINE

## 2024-11-24 PROCEDURE — 86850 RBC ANTIBODY SCREEN: CPT

## 2024-11-24 PROCEDURE — P9016 RBC LEUKOCYTES REDUCED: HCPCS

## 2024-11-24 PROCEDURE — 2580000003 HC RX 258: Performed by: INTERNAL MEDICINE

## 2024-11-24 RX ORDER — CEPHALEXIN 500 MG/1
500 CAPSULE ORAL 2 TIMES DAILY
DISCHARGE
Start: 2024-11-24 | End: 2024-12-01

## 2024-11-24 RX ORDER — FERROUS SULFATE 325(65) MG
325 TABLET ORAL
DISCHARGE
Start: 2024-11-24

## 2024-11-24 RX ORDER — POLYETHYLENE GLYCOL 3350 17 G/17G
17 POWDER, FOR SOLUTION ORAL DAILY PRN
DISCHARGE
Start: 2024-11-24 | End: 2024-12-24

## 2024-11-24 RX ORDER — SODIUM CHLORIDE 9 MG/ML
INJECTION, SOLUTION INTRAVENOUS PRN
Status: DISCONTINUED | OUTPATIENT
Start: 2024-11-24 | End: 2024-11-24 | Stop reason: HOSPADM

## 2024-11-24 RX ORDER — ONDANSETRON 4 MG/1
4 TABLET, ORALLY DISINTEGRATING ORAL EVERY 8 HOURS PRN
DISCHARGE
Start: 2024-11-24

## 2024-11-24 RX ORDER — LACTOBACILLUS RHAMNOSUS GG 10B CELL
1 CAPSULE ORAL
DISCHARGE
Start: 2024-11-24

## 2024-11-24 RX ADMIN — SODIUM CHLORIDE, PRESERVATIVE FREE 10 ML: 5 INJECTION INTRAVENOUS at 09:28

## 2024-11-24 RX ADMIN — Medication 200 MG: at 09:27

## 2024-11-24 RX ADMIN — PIPERACILLIN AND TAZOBACTAM 3375 MG: 3; .375 INJECTION, POWDER, LYOPHILIZED, FOR SOLUTION INTRAVENOUS at 02:15

## 2024-11-24 RX ADMIN — SODIUM BICARBONATE 650 MG: 650 TABLET ORAL at 09:27

## 2024-11-24 RX ADMIN — ESCITALOPRAM OXALATE 10 MG: 10 TABLET ORAL at 09:27

## 2024-11-24 RX ADMIN — PANTOPRAZOLE SODIUM 40 MG: 40 TABLET, DELAYED RELEASE ORAL at 09:27

## 2024-11-24 RX ADMIN — ENOXAPARIN SODIUM 30 MG: 100 INJECTION SUBCUTANEOUS at 09:27

## 2024-11-24 RX ADMIN — Medication 1 CAPSULE: at 09:27

## 2024-11-24 ASSESSMENT — PAIN SCALES - GENERAL
PAINLEVEL_OUTOF10: 0
PAINLEVEL_OUTOF10: 0

## 2024-11-24 NOTE — PLAN OF CARE
Problem: Chronic Conditions and Co-morbidities  Goal: Patient's chronic conditions and co-morbidity symptoms are monitored and maintained or improved  11/24/2024 0435 by Nichole Galo, RN  Outcome: Progressing     Problem: Skin/Tissue Integrity  Goal: Absence of new skin breakdown  Description: 1.  Monitor for areas of redness and/or skin breakdown  2.  Assess vascular access sites hourly  3.  Every 4-6 hours minimum:  Change oxygen saturation probe site  4.  Every 4-6 hours:  If on nasal continuous positive airway pressure, respiratory therapy assess nares and determine need for appliance change or resting period.  11/24/2024 0435 by Nichole Galo, RN  Outcome: Progressing     Problem: Safety - Adult  Goal: Free from fall injury  11/24/2024 1432 by Sandra Short, RN  Outcome: Completed  11/24/2024 0906 by Sandra Short, RN  Outcome: Progressing  11/24/2024 0435 by Nichole Galo, RN  Outcome: Progressing     Problem: Discharge Planning  Goal: Discharge to home or other facility with appropriate resources  11/24/2024 1432 by Sandra Short, RN  Outcome: Completed  11/24/2024 0906 by Sandra Short, RN  Outcome: Progressing  11/24/2024 0435 by Nichole Galo, RN  Outcome: Progressing

## 2024-11-24 NOTE — DISCHARGE SUMMARY
Hospitalist Discharge Summary    Patient:  Tawanna Antonio  YOB: 1931    MRN: [id number]   Acct: [id number]    Primary Care Physician: Bryson Ardon MD    Admit date:  11/22/2024    Discharge date:  11/24/2024       Discharge Diagnoses:   1.Acute cystitis without hematuria  2.  Sepsis due to UTI, resolved  3.  Acute on chronic anemia  4.  CKD stage IIIb  5.  Hypertension  6.  Recently diagnosed bladder mass with recommendation to follow-up outpatient for cystoscopy  7.  GERD, hiatal hernia  8.  Dementia    Discharge Medications:         Medication List        START taking these medications      cephALEXin 500 MG capsule  Commonly known as: KEFLEX  Take 1 capsule by mouth 2 times daily for 7 days     ondansetron 4 MG disintegrating tablet  Commonly known as: ZOFRAN-ODT  Take 1 tablet by mouth every 8 hours as needed for Nausea or Vomiting     polyethylene glycol 17 g packet  Commonly known as: GLYCOLAX  Take 1 packet by mouth daily as needed for Constipation            CONTINUE taking these medications      atorvastatin 10 MG tablet  Commonly known as: LIPITOR     escitalopram 10 MG tablet  Commonly known as: LEXAPRO  Take 1 tablet by mouth daily     lactobacillus capsule  Take 1 capsule by mouth daily (with breakfast)     magnesium oxide 400 (240 Mg) MG tablet  Commonly known as: MAG-OX  Take 0.5 tablets by mouth daily     PANTOPRAZOLE SODIUM PO     sodium bicarbonate 650 MG tablet  Take 1 tablet by mouth 2 times daily     vitamin D 50 MCG (2000 UT) Caps capsule  Commonly known as: CHOLECALCIFEROL  Take 1 capsule by mouth daily.               Where to Get Your Medications        Information about where to get these medications is not yet available    Ask your nurse or doctor about these medications  cephALEXin 500 MG capsule  lactobacillus capsule  ondansetron 4 MG disintegrating tablet  polyethylene glycol 17 g packet         Diet:  ADULT DIET; Regular    Activity:  Activity as tolerated

## 2024-11-24 NOTE — PLAN OF CARE
Problem: Safety - Adult  Goal: Free from fall injury  11/24/2024 0906 by Sandra Short, RN  Outcome: Progressing  11/24/2024 0435 by Nichole Galo RN  Outcome: Progressing     Problem: Chronic Conditions and Co-morbidities  Goal: Patient's chronic conditions and co-morbidity symptoms are monitored and maintained or improved  11/24/2024 0435 by Nichole Galo, RN  Outcome: Progressing     Problem: Discharge Planning  Goal: Discharge to home or other facility with appropriate resources  11/24/2024 0906 by Sandra Short, RN  Outcome: Progressing  11/24/2024 0435 by Nichole Galo, RN  Outcome: Progressing     Problem: Skin/Tissue Integrity  Goal: Absence of new skin breakdown  Description: 1.  Monitor for areas of redness and/or skin breakdown  2.  Assess vascular access sites hourly  3.  Every 4-6 hours minimum:  Change oxygen saturation probe site  4.  Every 4-6 hours:  If on nasal continuous positive airway pressure, respiratory therapy assess nares and determine need for appliance change or resting period.  11/24/2024 0435 by Nichole Galo, RN  Outcome: Progressing

## 2024-11-24 NOTE — DISCHARGE INSTR - COC
for the continuing treatment of the diagnosis listed and that she requires {Admit to Appropriate Level of Care:26736} for {GREATER/LESS:249722789} 30 days.     Update Admission H&P: {CHP DME Changes in HandP:866946159}    PHYSICIAN SIGNATURE:  {Esignature:641025904}

## 2024-11-24 NOTE — CONSENT
Informed Consent for Blood Component Transfusion Note    I have discussed with the patient the rationale for blood component transfusion; its benefits in treating or preventing fatigue, organ damage, or death; and its risk which includes mild transfusion reactions, rare risk of blood borne infection, or more serious but rare reactions. I have discussed the alternatives to transfusion, including the risk and consequences of not receiving transfusion. The patient had an opportunity to ask questions and had agreed to proceed with transfusion of blood components.    Electronically signed by Chris Guzman MD on 11/24/24 at 9:10 AM EST

## 2024-11-24 NOTE — PROGRESS NOTES
IV removed without incident. Discharge instructions provided to [facility] staff Denia. Verbalized understanding of follow up appointment, diet, activity, medications and reasons to return to ED/call physician. Advised to call back directly if there are further questions, or if these symptoms fail to improve as anticipated or worsen. All questions answered. Personal belongings (clothing, upper denture, right hearing aid, eyeglasses) sent with patient. Assisted to wheelchair. Denies further needs. In care of Denia, nurse from [place].

## 2024-11-25 ENCOUNTER — COMMUNITY CARE MANAGEMENT (OUTPATIENT)
Facility: CLINIC | Age: 88
End: 2024-11-25

## 2024-11-25 LAB
ABO/RH: NORMAL
ANTIBODY SCREEN: NEGATIVE
ARM BAND NUMBER: NORMAL
ARM BAND NUMBER: NORMAL
BLOOD BANK BLOOD PRODUCT EXPIRATION DATE: NORMAL
BLOOD BANK DISPENSE STATUS: NORMAL
BLOOD BANK ISBT PRODUCT BLOOD TYPE: 5100
BLOOD BANK SAMPLE EXPIRATION: NORMAL
BLOOD BANK UNIT TYPE AND RH: NORMAL
BPU ID: NORMAL
COMPONENT: NORMAL
CROSSMATCH RESULT: NORMAL
TRANSFUSION STATUS: NORMAL
UNIT DIVISION: 0
UNIT ISSUE DATE/TIME: NORMAL

## 2024-11-25 NOTE — PROGRESS NOTES
TCM Care Coordination Summary  [facility] [place] [phone] 11/25/24 Patient discharged back to the [facility] 11/24/24. Call made to the [facility] and spoke with Marylin, patient's nurse. Nurse confirmed that patient returned to their facility and stated that there are no needs at this time.-MELLO CC

## 2024-11-29 LAB
MICROORGANISM SPEC CULT: NORMAL
SERVICE CMNT-IMP: NORMAL
SPECIMEN DESCRIPTION: NORMAL

## 2024-12-05 ENCOUNTER — PROCEDURE VISIT (OUTPATIENT)
Dept: UROLOGY | Age: 88
End: 2024-12-05
Payer: MEDICARE

## 2024-12-05 VITALS — SYSTOLIC BLOOD PRESSURE: 122 MMHG | TEMPERATURE: 97.3 F | RESPIRATION RATE: 16 BRPM | DIASTOLIC BLOOD PRESSURE: 70 MMHG

## 2024-12-05 DIAGNOSIS — N32.89 BLADDER MASS: Primary | ICD-10-CM

## 2024-12-05 DIAGNOSIS — N32.1 COLOVESICAL FISTULA: ICD-10-CM

## 2024-12-05 PROCEDURE — 52000 CYSTOURETHROSCOPY: CPT | Performed by: UROLOGY

## 2024-12-05 NOTE — PROGRESS NOTES
HPI:        Patient is a 89 y.o. female in no acute distress.  She is alert and oriented to person, place, and time.      History  Patient presented to ER with weakness, increased confusion, urinary incontinence malodorous urine on 11/12/2024.  Patient apparently was having vaginal bleeding as well.  Patient is DNR CC.  She did have a urinalysis which showed 20-50 WBCs per high-powered field, 10-20 RBCs per high-powered field, positive leukocyte esterase and negative nitrates.  Patient was anemic.  Patient did have a CT abdomen and pelvis which revealed a 4.7 cm inferior right bladder mass.  Patient also had diffuse bladder wall thickening.  Patient was started on antibiotics and urology was consulted.     Patient does have longstanding chronic kidney disease.     Patient had prior hospitalization last month from 10/13/2024 through 10/18/2024 for urosepsis.  Urine at that time grew Aerococcus.  She was placed on culture specific antibiotics.  She was ultimately discharged to SNF.      Currently  Patient being seen today for bladder mass follow-up.  Patient did have recent CT scan which revealed approximately 5 cm bladder mass.  Patient has not had any recent gross hematuria or dysuria.  She reports no pain today.  We did review patient's CT scan again.  Patient does have polycystic kidney disease along with numerous cysts in her liver.  Patient does have significant diverticular disease.    Cystoscopy Procedure Note    Pre-operative Diagnosis: Bladder mass    Post-operative Diagnosis: Same     Surgeon: Lovely    Assistants: None    Anesthesia : Local    Procedure Details   The risks, benefits, complications, treatment options, and expected outcomes were discussed with the patient. The patient concurred with the proposed plan, giving informed consent.    Cystoscopy was performed today under local anesthesia, using sterile technique. The patient was placed in the dorsal lithotomy position, prepped with CHG, and

## 2024-12-07 ENCOUNTER — HOSPITAL ENCOUNTER (EMERGENCY)
Age: 88
Discharge: HOSPICE | End: 2024-12-07
Attending: FAMILY MEDICINE
Payer: MEDICARE

## 2024-12-07 ENCOUNTER — APPOINTMENT (OUTPATIENT)
Dept: GENERAL RADIOLOGY | Age: 88
End: 2024-12-07
Payer: MEDICARE

## 2024-12-07 VITALS
RESPIRATION RATE: 18 BRPM | OXYGEN SATURATION: 86 % | BODY MASS INDEX: 24.13 KG/M2 | DIASTOLIC BLOOD PRESSURE: 96 MMHG | WEIGHT: 145 LBS | SYSTOLIC BLOOD PRESSURE: 168 MMHG | HEART RATE: 125 BPM | TEMPERATURE: 102.8 F

## 2024-12-07 DIAGNOSIS — N18.4 STAGE 4 CHRONIC KIDNEY DISEASE (HCC): ICD-10-CM

## 2024-12-07 DIAGNOSIS — N39.0 SEPSIS DUE TO URINARY TRACT INFECTION (HCC): Primary | ICD-10-CM

## 2024-12-07 DIAGNOSIS — I50.9 MULTI-ORGAN FAILURE WITH HEART FAILURE (HCC): ICD-10-CM

## 2024-12-07 DIAGNOSIS — A41.9 SEPSIS DUE TO URINARY TRACT INFECTION (HCC): Primary | ICD-10-CM

## 2024-12-07 LAB
-: NORMAL
ALBUMIN SERPL-MCNC: 3 G/DL (ref 3.5–5.2)
ALP SERPL-CCNC: 160 U/L (ref 35–104)
ALT SERPL-CCNC: 7 U/L (ref 5–33)
ANION GAP SERPL CALCULATED.3IONS-SCNC: 18 MMOL/L (ref 9–17)
AST SERPL-CCNC: 14 U/L
BASOPHILS # BLD: 0 K/UL (ref 0–0.2)
BASOPHILS NFR BLD: 0 % (ref 0–2)
BILIRUB SERPL-MCNC: 0.6 MG/DL (ref 0.3–1.2)
BILIRUB UR QL STRIP: NEGATIVE
BUN SERPL-MCNC: 21 MG/DL (ref 8–23)
CALCIUM SERPL-MCNC: 10.4 MG/DL (ref 8.6–10.4)
CHLORIDE SERPL-SCNC: 94 MMOL/L (ref 98–107)
CLARITY UR: ABNORMAL
CO2 SERPL-SCNC: 23 MMOL/L (ref 20–31)
COLOR UR: ABNORMAL
COMMENT: ABNORMAL
CREAT SERPL-MCNC: 2.4 MG/DL (ref 0.5–0.9)
EKG ATRIAL RATE: 141 BPM
EKG P AXIS: 36 DEGREES
EKG P-R INTERVAL: 130 MS
EKG Q-T INTERVAL: 266 MS
EKG QRS DURATION: 54 MS
EKG QTC CALCULATION (BAZETT): 407 MS
EKG R AXIS: 16 DEGREES
EKG T AXIS: 112 DEGREES
EKG VENTRICULAR RATE: 141 BPM
EOSINOPHIL # BLD: 0.06 K/UL (ref 0–0.4)
EOSINOPHILS RELATIVE PERCENT: 1 % (ref 0–5)
ERYTHROCYTE [DISTWIDTH] IN BLOOD BY AUTOMATED COUNT: 17.4 % (ref 12.1–15.2)
GFR, ESTIMATED: 18 ML/MIN/1.73M2
GLUCOSE SERPL-MCNC: 124 MG/DL (ref 70–99)
GLUCOSE UR STRIP-MCNC: NEGATIVE MG/DL
HCT VFR BLD AUTO: 32.2 % (ref 36–46)
HGB BLD-MCNC: 10.2 G/DL (ref 12–16)
HGB UR QL STRIP.AUTO: ABNORMAL
IMM GRANULOCYTES # BLD AUTO: 0 K/UL (ref 0–0.3)
IMM GRANULOCYTES NFR BLD: 0 % (ref 0–5)
INR PPP: 1.2
KETONES UR STRIP-MCNC: NEGATIVE MG/DL
LACTATE BLDV-SCNC: 4.2 MMOL/L (ref 0.5–2.2)
LEUKOCYTE ESTERASE UR QL STRIP: ABNORMAL
LYMPHOCYTES NFR BLD: 1.22 K/UL (ref 1–4.8)
LYMPHOCYTES RELATIVE PERCENT: 21 % (ref 15–40)
MCH RBC QN AUTO: 27.1 PG (ref 26–34)
MCHC RBC AUTO-ENTMCNC: 31.7 G/DL (ref 31–37)
MCV RBC AUTO: 85.6 FL (ref 80–100)
MONOCYTES NFR BLD: 0 % (ref 4–8)
MONOCYTES NFR BLD: 0 K/UL (ref 0–1)
MORPHOLOGY: ABNORMAL
NEUTROPHILS NFR BLD: 78 % (ref 47–75)
NEUTS SEG NFR BLD: 4.52 K/UL (ref 2.5–7)
NITRITE UR QL STRIP: NEGATIVE
PARTIAL THROMBOPLASTIN TIME: 19.2 SEC (ref 23.9–33.8)
PH UR STRIP: 8 [PH] (ref 5–8)
PLATELET # BLD AUTO: 294 K/UL (ref 140–450)
PMV BLD AUTO: 9.1 FL (ref 6–12)
POTASSIUM SERPL-SCNC: 4.1 MMOL/L (ref 3.7–5.3)
PROT SERPL-MCNC: 7.2 G/DL (ref 6.4–8.3)
PROT UR STRIP-MCNC: ABNORMAL MG/DL
PROTHROMBIN TIME: 14.8 SEC (ref 11.5–14.2)
RBC # BLD AUTO: 3.76 M/UL (ref 4–5.2)
RBC #/AREA URNS HPF: NORMAL /HPF (ref 0–2)
SODIUM SERPL-SCNC: 135 MMOL/L (ref 135–144)
SP GR UR STRIP: 1.01 (ref 1–1.03)
TROPONIN I SERPL HS-MCNC: 21 NG/L (ref 0–14)
UROBILINOGEN UR STRIP-ACNC: NORMAL EU/DL (ref 0–1)
WBC #/AREA URNS HPF: NORMAL /HPF
WBC OTHER # BLD: 5.8 K/UL (ref 3.5–11)

## 2024-12-07 PROCEDURE — 85025 COMPLETE CBC W/AUTO DIFF WBC: CPT

## 2024-12-07 PROCEDURE — 85730 THROMBOPLASTIN TIME PARTIAL: CPT

## 2024-12-07 PROCEDURE — 2580000003 HC RX 258: Performed by: FAMILY MEDICINE

## 2024-12-07 PROCEDURE — 96365 THER/PROPH/DIAG IV INF INIT: CPT

## 2024-12-07 PROCEDURE — 80053 COMPREHEN METABOLIC PANEL: CPT

## 2024-12-07 PROCEDURE — 6360000002 HC RX W HCPCS: Performed by: FAMILY MEDICINE

## 2024-12-07 PROCEDURE — 81001 URINALYSIS AUTO W/SCOPE: CPT

## 2024-12-07 PROCEDURE — 87086 URINE CULTURE/COLONY COUNT: CPT

## 2024-12-07 PROCEDURE — 83605 ASSAY OF LACTIC ACID: CPT

## 2024-12-07 PROCEDURE — 93005 ELECTROCARDIOGRAM TRACING: CPT | Performed by: FAMILY MEDICINE

## 2024-12-07 PROCEDURE — 71045 X-RAY EXAM CHEST 1 VIEW: CPT

## 2024-12-07 PROCEDURE — 6370000000 HC RX 637 (ALT 250 FOR IP): Performed by: FAMILY MEDICINE

## 2024-12-07 PROCEDURE — 87040 BLOOD CULTURE FOR BACTERIA: CPT

## 2024-12-07 PROCEDURE — 84484 ASSAY OF TROPONIN QUANT: CPT

## 2024-12-07 PROCEDURE — 87077 CULTURE AEROBIC IDENTIFY: CPT

## 2024-12-07 PROCEDURE — 87205 SMEAR GRAM STAIN: CPT

## 2024-12-07 PROCEDURE — 36415 COLL VENOUS BLD VENIPUNCTURE: CPT

## 2024-12-07 PROCEDURE — 96375 TX/PRO/DX INJ NEW DRUG ADDON: CPT

## 2024-12-07 PROCEDURE — 85610 PROTHROMBIN TIME: CPT

## 2024-12-07 PROCEDURE — 99285 EMERGENCY DEPT VISIT HI MDM: CPT

## 2024-12-07 RX ORDER — ACETAMINOPHEN 650 MG/1
650 SUPPOSITORY RECTAL ONCE
Status: COMPLETED | OUTPATIENT
Start: 2024-12-07 | End: 2024-12-07

## 2024-12-07 RX ORDER — 0.9 % SODIUM CHLORIDE 0.9 %
1000 INTRAVENOUS SOLUTION INTRAVENOUS ONCE
Status: COMPLETED | OUTPATIENT
Start: 2024-12-07 | End: 2024-12-07

## 2024-12-07 RX ORDER — ACETAMINOPHEN 500 MG
1000 TABLET ORAL ONCE
Status: COMPLETED | OUTPATIENT
Start: 2024-12-07 | End: 2024-12-07

## 2024-12-07 RX ORDER — ONDANSETRON 2 MG/ML
4 INJECTION INTRAMUSCULAR; INTRAVENOUS ONCE
Status: COMPLETED | OUTPATIENT
Start: 2024-12-07 | End: 2024-12-07

## 2024-12-07 RX ADMIN — SODIUM CHLORIDE 1000 ML: 9 INJECTION, SOLUTION INTRAVENOUS at 10:50

## 2024-12-07 RX ADMIN — ACETAMINOPHEN 650 MG: 650 SUPPOSITORY RECTAL at 11:23

## 2024-12-07 RX ADMIN — ONDANSETRON 4 MG: 2 INJECTION INTRAMUSCULAR; INTRAVENOUS at 10:51

## 2024-12-07 RX ADMIN — PIPERACILLIN AND TAZOBACTAM 2250 MG: 2; .25 INJECTION, POWDER, FOR SOLUTION INTRAVENOUS at 11:12

## 2024-12-07 RX ADMIN — ACETAMINOPHEN 1000 MG: 500 TABLET, FILM COATED ORAL at 10:51

## 2024-12-07 ASSESSMENT — PAIN - FUNCTIONAL ASSESSMENT: PAIN_FUNCTIONAL_ASSESSMENT: NONE - DENIES PAIN

## 2024-12-07 NOTE — ED PROVIDER NOTES
eMERGENCY dEPARTMENT eNCOUnter      CHIEF COMPLAINT    Chief Complaint   Patient presents with    Complains of being cold. Denies any pain.     Arrived via [facility] from the [facility] [place] with report of shaking and being cold. No temperature was evaluated from the [facility] [place]. Temp on arrival 103.0 oral.       HPI    Tawanna Antonio is a 89 y.o. female who presents to ED from the [facility] due to complaint of shaking and being cold.  Patient's temperature on arrival to the ED was 103 orally.  Patient has a history of a colovesical fistula.  Patient and family did not want surgery for this.  She has had a history of urosepsis in the past and was hospitalized for this from 10/13/2024 until 10/18/2024..  She has a history of chronic kidney disease.  Patient had a cystoscopy done on 12/5/2024.          REVIEW OF SYSTEMS    All systems reviewed and positives are in the HPI    PAST MEDICAL HISTORY    Past Medical History:   Diagnosis Date    Cancer (HCC)     colon    Colon cancer (HCC) 10/01/2002    Dementia (HCC)     Diabetes mellitus (HCC)     Esophageal reflux     Heart disease     Hypercholesterolemia     Hypertension     Microalbuminuria     Osteoarthrosis     Osteoporosis     Polycystic kidney disease        SURGICAL HISTORY    Past Surgical History:   Procedure Laterality Date    COLONOSCOPY  1/09, 5/1/12    COLONOSCOPY  07/10/2017    Dr. Ardon:  3 adenomatous polyps.    DIAGNOSTIC CARDIAC CATH LAB PROCEDURE  2/28/08    HEMICOLECTOMY  10/02    HYSTERECTOMY (CERVIX STATUS UNKNOWN)      KNEE SURGERY  1973    AL COLON CA SCRN NOT HI RSK IND N/A 7/10/2017    COLONOSCOPY performed by Bryson Ardon MD at [facility] OR       CURRENT MEDICATIONS    Current Outpatient Rx   Medication Sig Dispense Refill    ondansetron (ZOFRAN-ODT) 4 MG disintegrating tablet Take 1 tablet by mouth every 8 hours as needed for Nausea or Vomiting      ferrous sulfate (IRON 325) 325 (65 Fe) MG tablet Take 1 tablet by mouth daily (with breakfast)

## 2024-12-07 NOTE — ED NOTES
Straight cath urine was completed and urine was thick and brown. Cloudy with sediment. Foul odor noted.

## 2024-12-07 NOTE — ED NOTES
Dr. Seymour and writer spoke with patient family(son) about hospice. Son agreed that he would like hospice consult.[facility] consulted. Faxed a face sheet.

## 2024-12-08 LAB
MICROORGANISM SPEC CULT: ABNORMAL
MICROORGANISM SPEC CULT: NORMAL
SERVICE CMNT-IMP: ABNORMAL
SPECIMEN DESCRIPTION: ABNORMAL
SPECIMEN DESCRIPTION: NORMAL

## 2024-12-09 LAB
EKG ATRIAL RATE: 141 BPM
EKG P AXIS: 36 DEGREES
EKG P-R INTERVAL: 130 MS
EKG Q-T INTERVAL: 266 MS
EKG QRS DURATION: 54 MS
EKG QTC CALCULATION (BAZETT): 407 MS
EKG R AXIS: 16 DEGREES
EKG T AXIS: 112 DEGREES
EKG VENTRICULAR RATE: 141 BPM

## 2024-12-09 PROCEDURE — 93010 ELECTROCARDIOGRAM REPORT: CPT | Performed by: INTERNAL MEDICINE

## 2024-12-11 LAB
MICROORGANISM SPEC CULT: ABNORMAL
SERVICE CMNT-IMP: ABNORMAL
SERVICE CMNT-IMP: ABNORMAL
SPECIMEN DESCRIPTION: ABNORMAL
SPECIMEN DESCRIPTION: ABNORMAL

## (undated) DEVICE — MEDI-VAC NON-CONDUCTIVE SUCTION TUBING 6MM X 6.1M (20 FT.) L: Brand: CARDINAL HEALTH

## (undated) DEVICE — GOWN,AURORA,NONRNF,XL,30/CS: Brand: MEDLINE